# Patient Record
Sex: FEMALE | Race: BLACK OR AFRICAN AMERICAN | Employment: OTHER | URBAN - METROPOLITAN AREA
[De-identification: names, ages, dates, MRNs, and addresses within clinical notes are randomized per-mention and may not be internally consistent; named-entity substitution may affect disease eponyms.]

---

## 2019-10-17 ENCOUNTER — OFFICE VISIT (OUTPATIENT)
Dept: PRIMARY CARE CLINIC | Age: 69
End: 2019-10-17

## 2019-10-17 VITALS
HEART RATE: 70 BPM | DIASTOLIC BLOOD PRESSURE: 67 MMHG | SYSTOLIC BLOOD PRESSURE: 118 MMHG | OXYGEN SATURATION: 97 % | RESPIRATION RATE: 16 BRPM | WEIGHT: 156.8 LBS | BODY MASS INDEX: 28.85 KG/M2 | TEMPERATURE: 97.8 F | HEIGHT: 62 IN

## 2019-10-17 DIAGNOSIS — Z76.89 ENCOUNTER TO ESTABLISH CARE: ICD-10-CM

## 2019-10-17 DIAGNOSIS — R26.9 GAIT DIFFICULTY: ICD-10-CM

## 2019-10-17 DIAGNOSIS — I69.359 CVA, OLD, HEMIPARESIS (HCC): Primary | ICD-10-CM

## 2019-10-17 DIAGNOSIS — E11.65 TYPE 2 DIABETES MELLITUS WITH HYPERGLYCEMIA, UNSPECIFIED WHETHER LONG TERM INSULIN USE (HCC): ICD-10-CM

## 2019-10-17 DIAGNOSIS — I10 HYPERTENSION, UNSPECIFIED TYPE: ICD-10-CM

## 2019-10-17 DIAGNOSIS — R44.0 AUDITORY HALLUCINATIONS: ICD-10-CM

## 2019-10-17 DIAGNOSIS — R53.1 WEAKNESS: ICD-10-CM

## 2019-10-17 RX ORDER — METFORMIN HYDROCHLORIDE 1000 MG/1
1000 TABLET ORAL 2 TIMES DAILY WITH MEALS
COMMUNITY
End: 2019-12-20 | Stop reason: DRUGHIGH

## 2019-10-17 RX ORDER — TRAZODONE HYDROCHLORIDE 50 MG/1
TABLET ORAL
COMMUNITY
End: 2019-10-17

## 2019-10-17 RX ORDER — CLOPIDOGREL BISULFATE 75 MG/1
TABLET ORAL
COMMUNITY
End: 2019-11-18 | Stop reason: SDUPTHER

## 2019-10-17 RX ORDER — LISINOPRIL 30 MG/1
30 TABLET ORAL DAILY
COMMUNITY
End: 2020-02-24 | Stop reason: SDUPTHER

## 2019-10-17 RX ORDER — ROSUVASTATIN CALCIUM 20 MG/1
20 TABLET, COATED ORAL
Status: ON HOLD | COMMUNITY
End: 2020-03-26 | Stop reason: CLARIF

## 2019-10-17 RX ORDER — SERTRALINE HYDROCHLORIDE 50 MG/1
TABLET, FILM COATED ORAL
COMMUNITY
End: 2019-12-31 | Stop reason: SDUPTHER

## 2019-10-17 RX ORDER — PROPRANOLOL HYDROCHLORIDE 40 MG/1
40 TABLET ORAL 2 TIMES DAILY
Status: ON HOLD | COMMUNITY
End: 2020-03-26 | Stop reason: CLARIF

## 2019-10-17 RX ORDER — QUETIAPINE FUMARATE 50 MG/1
50 TABLET, FILM COATED ORAL 2 TIMES DAILY
COMMUNITY
End: 2019-11-18 | Stop reason: SDUPTHER

## 2019-10-17 RX ORDER — AMLODIPINE BESYLATE AND ATORVASTATIN CALCIUM 10; 10 MG/1; MG/1
1 TABLET, FILM COATED ORAL DAILY
COMMUNITY
End: 2019-11-23

## 2019-10-17 NOTE — PROGRESS NOTES
This note will not be viewable in [address] Javon Cline is a 68y.o. year old female who is a new patient to me today. She was previously followed by primary care in [place]. Javon Cline is a  68 y.o. female presents for visit. Chief Complaint   Patient presents with    New Patient     patient is from [place] and was visiting and had a stroke and states that she is weak in the limbs and has a echo in the ear. HPI    Patient presents with her daughter and a family members to establish care and for follow-up after ED to hospital admission [facility] for possible stroke/TIA on 9/27/2019. Discharged on 9/28/2019 and CVA/TIA ruled out. Patient discharge documents brought in for review. She has a history of a CVA in January 2018 with residual mild left sided hemiparesis. Reports she was visiting family members in [place] when she noticed increased left-sided numbness and weakness. Brain MRI negative, head CT negative and EKG normal.  Levemir was decreased to 20 units subcu daily due to lower blood sugar readings. She was discharged home with recommendation for home PT/OT. Family members report patient started to experience auditory hallucinations after her stroke last year. Hallucinations are pretty much continuous and patient has conversations with voices. She takes Seroquel 50 mg at bedtime that helps her sleep but not consistently. Patient carries the diagnoses of diabetes type 2, hypertension and depression and anxiety. Misses doses at times. Laboratory data on 9/28/2019 showed a hemoglobin A1c of 9.3. Review of Systems   Constitutional: Negative for chills and fever. Gastrointestinal: Positive for heartburn. Neurological: Positive for tremors and sensory change. Focal weakness: Left hemiparesis- s/p CVA in January 2018. Psychiatric/Behavioral: Positive for depression and memory loss.  Hallucinations: auditory-daily  The patient is nervous/anxious and has insomnia. Past Medical History:   Diagnosis Date    Anxiety     Auditory hallucinations     Depression     Diabetes ([facility] [address]     Esophageal reflux     Hypertension     Stroke ([facility] [address] 01/2018      History reviewed. No pertinent surgical history. Social History     Tobacco Use    Smoking status: Never Smoker    Smokeless tobacco: Never Used   Substance Use Topics    Alcohol use: Not Currently      Social History     Social History Narrative    Not on file     History reviewed. No pertinent family history. Prior to Admission medications    Medication Sig Start Date End Date Taking? Authorizing Provider   amLODIPine-atorvastatin (CADUET) 10-10 mg per tablet Take 1 Tab by mouth daily. Yes Provider, Historical   clopidogrel (PLAVIX) 75 mg tab Take  by mouth. Yes Provider, Historical   insulin detemir U-100 (LEVEMIR U-100 INSULIN) 100 unit/mL injection by SubCUTAneous route nightly. Yes Provider, Historical   lisinopril (PRINIVIL, ZESTRIL) 30 mg tablet Take 30 mg by mouth daily. Yes Provider, Historical   metFORMIN (GLUCOPHAGE) 1,000 mg tablet Take 1,000 mg by mouth two (2) times daily (with meals). Yes Provider, Historical   propranolol (INDERAL) 40 mg tablet Take  by mouth four (4) times daily. Yes Provider, Historical   QUEtiapine (SEROQUEL) 50 mg tablet Take 50 mg by mouth two (2) times a day. Yes Provider, Historical   rosuvastatin (CRESTOR) 20 mg tablet Take 20 mg by mouth nightly. Yes Provider, Historical   sertraline HCl (SERTRALINE PO) Take  by mouth. Yes Provider, Historical   traZODone (DESYREL) 50 mg tablet Take  by mouth nightly.   10/17/19  Provider, Historical      No Known Allergies       Visit Vitals  /67 (BP 1 Location: Left arm, BP Patient Position: Sitting)   Pulse 70   Temp 97.8 °F (36.6 °C) (Oral)   Resp 16   Ht 5' 2\" (1.575 m)   Wt 156 lb 12.8 oz (71.1 kg)   SpO2 97%   BMI 28.68 kg/m²     Physical Exam   Constitutional: She is oriented to person, place, and time. She appears well-developed and well-nourished. No distress. HENT:   Head: Normocephalic and atraumatic. Right Ear: External ear normal.   Left Ear: External ear normal.   Eyes: Conjunctivae are normal.   Cardiovascular: Normal rate and regular rhythm. Pulmonary/Chest: Effort normal and breath sounds normal. She has no wheezes. Musculoskeletal: She exhibits no edema. Left hemiparesis in upper and lower extremities-    Neurological: She is alert and oriented to person, place, and time. Gait abnormal.   Skin: Skin is warm and dry. Psychiatric: She has a normal mood and affect. Her behavior is normal.   Nursing note and vitals reviewed. ASSESSMENT AND PLAN:  There are no active problems to display for this patient. ICD-10-CM ICD-9-CM   1. CVA, old, hemiparesis ([facility] [address] I69.359 438.20   2. Auditory hallucinations R44.0 780.1   3. Weakness R53.1 780.79   4. Gait difficulty R26.9 781.2   5. Hypertension, unspecified type I10 401.9   6. Type 2 diabetes mellitus with hyperglycemia, unspecified whether long term insulin use (HCC) E11.65 250.00   7.  Encounter to establish care Z76.89 V65.8     Orders Placed This Encounter    [facility] Neurology [facility]     Referral Priority:   Routine     Referral Type:   Consultation     Referral Reason:   Specialty Services Required     Referred to Provider:   French Coronado MD     Number of Visits Requested:   1    REFERRAL TO PSYCHIATRY     Referral Priority:   Routine     Referral Type:   Behavioral Health     Referral Reason:   Specialty Services Required     Referred to Provider:   Ligia Hoff MD     Number of Visits Requested:   1    [facility] Physical Therapy [facility]     Referral Priority:   Routine     Referral Type:   PT/OT/ST     Referral Reason:   Specialty Services Required     Requested Specialty:   Physical Therapy     Number of Visits Requested:   1    amLODIPine-atorvastatin (CADUET) 10-10 mg per tablet     Sig: Take 1 Tab by mouth daily.  clopidogrel (PLAVIX) 75 mg tab     Sig: Take  by mouth.  insulin detemir U-100 (LEVEMIR U-100 INSULIN) 100 unit/mL injection     Sig: by SubCUTAneous route nightly.  lisinopril (PRINIVIL, ZESTRIL) 30 mg tablet     Sig: Take 30 mg by mouth daily.  metFORMIN (GLUCOPHAGE) 1,000 mg tablet     Sig: Take 1,000 mg by mouth two (2) times daily (with meals).  propranolol (INDERAL) 40 mg tablet     Sig: Take  by mouth four (4) times daily.  QUEtiapine (SEROQUEL) 50 mg tablet     Sig: Take 50 mg by mouth two (2) times a day.  rosuvastatin (CRESTOR) 20 mg tablet     Sig: Take 20 mg by mouth nightly.  sertraline HCl (SERTRALINE PO)     Sig: Take  by mouth.  DISCONTD: traZODone (DESYREL) 50 mg tablet     Sig: Take  by mouth nightly. Diagnoses and all orders for this visit:    1. CVA, old, hemiparesis ([facility] [address]  -     [address] THERAPY        -      Continue Plavix and Crestor. 2. Auditory hallucinations  -     REFERRAL TO NEUROLOGY  -     REFERRAL TO PSYCHIATRY        -       3. Weakness  -     REFERRAL TO PHYSICAL THERAPY    4. Gait difficulty  -     REFERRAL TO PHYSICAL THERAPY    5. Hypertension, unspecified type       -Continue lisinopril, amlodipine and propranolol    6. type 2 diabetes mellitus with complication, with long-term current use of insulin ([facility] [address]        -Continue current treatment. 7. Encounter to establish care      lab results and schedule of future lab studies reviewed with patient  reviewed diet, exercise and weight control    See scanned patient discharge documents under media tab. Follow-up and Dispositions    · Return in about 4 weeks (around 11/14/2019), or if symptoms worsen or fail to improve, for chronic care with Dr. Ibrahima Walker.                Disclaimer:  Advised her to call back or return to office if symptoms worsen/change/persist.  Discussed expected course/resolution/complications of diagnosis in detail with patient. Medication risks/benefits/costs/interactions/alternatives discussed with patient. She was given an after visit summary which includes diagnoses, current medications, & vitals. She expressed understanding with the diagnosis and plan.

## 2019-10-17 NOTE — PROGRESS NOTES
Chief Complaint   Patient presents with    New Patient     patient is from [place] and was visiting and had a stroke and states that she is weak in the limbs and has a echo in the ear.

## 2019-10-22 NOTE — TELEPHONE ENCOUNTER
LOV: 10/17/2019  Labs: None on file   A1C: Not on file   Refill: 1st Refill- New Patient   Next Appt:     Jody she has upcoming appointments with Sports Medicine and Primary Care? Maybe, she is going to them for PT? Your POC:   Return in about 4 weeks (around 11/14/2019), or if symptoms worsen or fail to improve, for chronic care with Dr. Sammie Soriano- This request was sent back to us and they said your patient.

## 2019-11-05 ENCOUNTER — HOSPITAL ENCOUNTER (OUTPATIENT)
Dept: PHYSICAL THERAPY | Age: 69
Discharge: HOME | End: 2019-11-05
Payer: MEDICARE

## 2019-11-05 PROCEDURE — 97112 NEUROMUSCULAR REEDUCATION: CPT | Performed by: PHYSICAL THERAPIST

## 2019-11-05 PROCEDURE — 97110 THERAPEUTIC EXERCISES: CPT | Performed by: PHYSICAL THERAPIST

## 2019-11-05 PROCEDURE — 97162 PT EVAL MOD COMPLEX 30 MIN: CPT | Performed by: PHYSICAL THERAPIST

## 2019-11-05 NOTE — PROGRESS NOTES
PT INITIAL EVALUATION NOTE - [facility] 2-15    Patient Name: Vahe Rai  Date:2019  : 1950  [x]  Patient  Verified  Payor: VA MEDICARE / Plan: VA MEDICARE PART A & B / Product Type: Medicare /    In time:3:00 PM  Out time:4:00 PM  Total Treatment Time (min): 60  Total Timed Codes (min): 25  1:1 Treatment Time ( [address] only): 60   Visit #: 1     Treatment Area: Gait instability [R26.81]    SUBJECTIVE  Pain Level (0-10 scale): 0  Any medication changes, allergies to medications, adverse drug reactions, diagnosis change, or new procedure performed?: [] No    [x] Yes (see summary sheet for update)  Subjective:    L hemiparesis, CVA  PLOF: Significantly less limitation with walking, standing  Mechanism of Injury: Patient reports an onset of left-sided weakness and numbness on 19 and was evaluated at [facility]. They ruled out a CVA/TIA and she was told to f/u with her PCP. Previous Treatment/Compliance: On a f/u visit with her PCP two weeks later, she continued to experience L UE and LE weakness and was referred to PT for strengthening. PMHx/Surgical Hx:   CVA in 2018  Work Hx: n/a  Living Situation: Lives currently with daughter's family, but permanently resides in [place], [place]. Plans on returning home to [place] to a single family home once she has greater strength and balance.   Pt Goals: to reduce fall risk  Barriers: none  Motivation: motivated  Substance use: none  FABQ Score: n/a  Cognition: A & O x 4        OBJECTIVE/EXAMINATION                                UPPER QUARTER   MUSCLE STRENGTH  KEY       R  L  0 - No Contraction  C1, C2 Neck Flex     1 - Trace   C3 Side Flex      2 - Poor   C4 Sh Elev      3 - Fair    C5 Deltoid/Biceps 4/5  3+/5    4 - Good   C6 Wrist Ext  4/5  4/5    5 - Normal   C7 Triceps  4/5  3+/5        C8 Thumb Ext  3+/5  3+/5        T1 Hand Inst  3+/5  3+/5        LOWER QUARTER   MUSCLE STRENGTH  KEY       R  L  0 - No Contraction  L1, L2 Psoas  4/5  3+/5    1 - Trace   L3 Quads  4/5  3+/5    2 - Poor   L4 Tib Ant  4/5  3+/5    3 - Fair    L5 EHL  4/5  3+/5    4 - Good   S1 FHL  3+/5  3+/5    5 - Normal   S2 Hams  3+/5  3+/5          Sensation: Widespread throughout L side of body, no specific dermatomal distribution    Vision: NT    Balance/ Equilibrium:            Single Leg Stance:   R 10   Seconds L 8 Seconds     Eyes Closed: Unable to perform    Functional Mobility      Bed Mobility:  WNL          Transfers:       Sit-Stand:      Gait: Slow, cautious speed, weightbearing through forefoot       Stairs: NT    Behavior: in good spirits, motivated    Cognition:   [] One Step Commands   [x] Multiple Commands, although the patient has significant difficulty. Often confuses previous commands with current demand and requires frequent verbal cueing for most balance testing       [x] Displays Neglect [] R  [x] L    Other:       Impaired Judgement: [x] Y    [] N      Impaired Vision:  [x] Y    [] N      Safety Awareness   [x] Y    [] N      Impaired Hearing  [x] Y    [] N      Able to Express Needs [x] Y    [] N    Optional Tests:       Functional Gait Assessment (30pt scale):16/30       Pate Balance Scale (56pt scale): 45/56      30 min Therapeutic Exercise:  [x] See flow sheet :   Rationale: increase ROM, increase strength and improve coordination to improve the patients ability to walk without LOB     10 min Neuromuscular Re-education:  [x]  See flow sheet :   Rationale: improve coordination, improve balance and increase proprioception  to improve the patients ability to walk without LOB          With   [] TE   [] TA   [] neuro   [] other: Patient Education: [x] Review HEP    [] Progressed/Changed HEP based on:   [] positioning   [] body mechanics   [] transfers   [] heat/ice application    [] other:      Other Objective/Functional Measures:   Patient needed frequent verbal cueing for form and to complete required repetitions.  Likely due to cognitive limitations and not a lack of motivation. Pain Level (0-10 scale) post treatment: 0    ASSESSMENT/Changes in Function:     [x]  See Plan of Israel Slater, PT , DPT, OCS, Cert.  DN   11/5/2019

## 2019-11-05 NOTE — PROGRESS NOTES
[address]  Phone: [phone]  Fax: [phone]    Plan of Care/ Statement of Necessity for Physical Therapy Services 2-15    Patient name: Zoë Weber  : 1950  Provider#: [id number]  Referral source: Vivi Pereira NP      Medical/Treatment Diagnosis: Gait instability [R26.81]     Prior Hospitalization: see medical history     Comorbidities: CVA  Prior Level of Function: see initial eval  Medications: Verified on Patient Summary List    Start of Care: 19      Onset Date: 19       The Plan of Care and following information is based on the information from the initial evaluation. Assessment/ key information: Patient presents with left hemiparesis due to an old CVA from 2018. She has significant strength and endurance limitations at the L LE and is at a moderate fall risk. Evaluation Complexity History MEDIUM  Complexity : 1-2 comorbidities / personal factors will impact the outcome/ POC ; Examination MEDIUM Complexity : 3 Standardized tests and measures addressing body structure, function, activity limitation and / or participation in recreation  ;Presentation MEDIUM Complexity : Evolving with changing characteristics  ; Clinical Decision Making MEDIUM Complexity : FOTO score of 26-74  Overall Complexity Rating: MEDIUM    Problem List: pain affecting function, decrease ROM, decrease strength, impaired gait/ balance, decrease ADL/ functional abilitiies, decrease activity tolerance, decrease flexibility/ joint mobility and decrease transfer abilities   Treatment Plan may include any combination of the following: Therapeutic exercise, Therapeutic activities, Neuromuscular re-education, Physical agent/modality, Gait/balance training, Manual therapy, Patient education, Self Care training, Functional mobility training, Home safety training and Stair training  Patient / Family readiness to learn indicated by: asking questions, trying to perform skills and interest  Persons(s) to be included in education: patient (P)  Barriers to Learning/Limitations: None  Patient Goal (s): I want to be able to walk without a loss of balance.   Patient Self Reported Health Status: excellent  Rehabilitation Potential: excellent    Short Term Goals: To be accomplished in 12 treatments:  1. Patient will be able to demonstrate SLS >15 sec B to allow for improved stability when ambulating on uneven surfaces. 2. Patient will be able to walk two blocks with no rest breaks and no LOB. 3. Patient will be able to stand for 10 minutes with no rest breaks and no LOB. Long Term Goals: To be accomplished in 24 treatments:  1. Patient will be able to demonstrate SLS >30 sec B to allow for decreased fall risk. 2. Patient will be able to walk 1/4 mile with no rest breaks or LOB. 3. Patient will be able to stand for 20 minutes with no rest breaks or LOB. Frequency / Duration: Patient to be seen 2 times per week for 12 weeks. Patient/ Caregiver education and instruction: self care, activity modification and exercises    [x]  Plan of care has been reviewed with PTA    Maty Heartable, PT , DPT, OCS, Cert. DN   11/5/2019     ________________________________________________________________________    I certify that the above Therapy Services are being furnished while the patient is under my care. I agree with the treatment plan and certify that this therapy is necessary.     [de-identified] Signature:____________________  Date:____________Time: _________

## 2019-11-07 ENCOUNTER — HOSPITAL ENCOUNTER (OUTPATIENT)
Dept: PHYSICAL THERAPY | Age: 69
Discharge: HOME | End: 2019-11-07
Payer: MEDICARE

## 2019-11-07 PROCEDURE — 97112 NEUROMUSCULAR REEDUCATION: CPT | Performed by: PHYSICAL MEDICINE & REHABILITATION

## 2019-11-07 PROCEDURE — 97110 THERAPEUTIC EXERCISES: CPT | Performed by: PHYSICAL MEDICINE & REHABILITATION

## 2019-11-07 NOTE — PROGRESS NOTES
PT DAILY TREATMENT NOTE - [facility] 2-15    Patient Name: Alonzo Hagan  Date:2019  : 1950  [x]  Patient  Verified  Payor: VA MEDICARE / Plan: VA MEDICARE PART A & B / Product Type: Medicare /    In time:210 pm  Out time: 250 pm  Total Treatment Time (min): 40  Total Timed Codes (min): 40  1:1 Treatment Time ( only): 40   Visit #: 2      Treatment Area: Gait instability [R26.81]    SUBJECTIVE  Pain Level (0-10 scale): 0/10  Any medication changes, allergies to medications, adverse drug reactions, diagnosis change, or new procedure performed?: [x] No    [] Yes (see summary sheet for update)  Subjective functional status/changes:   [] No changes reported  Patient reported that she felt really good after last visit. OBJECTIVE  30 min Therapeutic Exercise:  [x] See flow sheet :   Rationale: increase ROM, increase strength and improve coordination to improve the patients ability to ADLs, standing and walking tolerance     10 min Neuromuscular Re-education:  [x]  See flow sheet :   Rationale: improve coordination, improve balance and increase proprioception  to improve the patients ability to ADLs, standing and walking tolerance          With   [x] TE   [] TA   [] neuro   [] other: Patient Education: [x] Review HEP    [] Progressed/Changed HEP based on:   [] positioning   [] body mechanics   [] transfers   [] heat/ice application    [] other:      Other Objective/Functional Measures: Mod fatigue with today's strength exercises     Pain Level (0-10 scale) post treatment: 0/10    ASSESSMENT/Changes in Function:     Patient will continue to benefit from skilled PT services to modify and progress therapeutic interventions, address functional mobility deficits, address ROM deficits, address strength deficits, analyze and address soft tissue restrictions, analyze and cue movement patterns, analyze and modify body mechanics/ergonomics and assess and modify postural abnormalities to attain remaining goals.      [] See Plan of Care  []  See progress note/recertification  []  See Discharge Summary         Progress towards goals / Updated goals:  Patient is progressing well towards goals but needs mod and frequent vcs for proper form.     PLAN  [x]  Upgrade activities as tolerated     [x]  Continue plan of care  [x]  Update interventions per flow sheet       []  Discharge due to:_  []  Other:_      SHAWN Davies, CPT  11/7/2019

## 2019-11-12 ENCOUNTER — APPOINTMENT (OUTPATIENT)
Dept: PHYSICAL THERAPY | Age: 69
End: 2019-11-12
Payer: MEDICARE

## 2019-11-14 ENCOUNTER — APPOINTMENT (OUTPATIENT)
Dept: PHYSICAL THERAPY | Age: 69
End: 2019-11-14
Payer: MEDICARE

## 2019-11-18 ENCOUNTER — OFFICE VISIT (OUTPATIENT)
Dept: INTERNAL MEDICINE CLINIC | Age: 69
End: 2019-11-18

## 2019-11-18 VITALS
HEIGHT: 62 IN | WEIGHT: 154 LBS | HEART RATE: 67 BPM | SYSTOLIC BLOOD PRESSURE: 115 MMHG | RESPIRATION RATE: 16 BRPM | BODY MASS INDEX: 28.34 KG/M2 | DIASTOLIC BLOOD PRESSURE: 50 MMHG | TEMPERATURE: 98.4 F

## 2019-11-18 DIAGNOSIS — R41.3 MEMORY LOSS: ICD-10-CM

## 2019-11-18 DIAGNOSIS — R44.0 AUDITORY HALLUCINATION: ICD-10-CM

## 2019-11-18 DIAGNOSIS — Z86.73 HISTORY OF CVA (CEREBROVASCULAR ACCIDENT): Primary | ICD-10-CM

## 2019-11-18 LAB — HBA1C MFR BLD HPLC: 11.6 %

## 2019-11-18 RX ORDER — QUETIAPINE FUMARATE 50 MG/1
50 TABLET, FILM COATED ORAL 2 TIMES DAILY
Qty: 60 TAB | Refills: 2 | Status: SHIPPED | OUTPATIENT
Start: 2019-11-18 | End: 2020-01-02 | Stop reason: SDUPTHER

## 2019-11-18 RX ORDER — CLOPIDOGREL BISULFATE 75 MG/1
75 TABLET ORAL DAILY
Qty: 30 TAB | Refills: 2 | Status: SHIPPED | OUTPATIENT
Start: 2019-11-18 | End: 2020-02-25

## 2019-11-18 RX ORDER — METFORMIN HYDROCHLORIDE 500 MG/1
TABLET, EXTENDED RELEASE ORAL
Qty: 60 TAB | Refills: 2 | Status: ON HOLD | OUTPATIENT
Start: 2019-11-18 | End: 2020-03-26 | Stop reason: CLARIF

## 2019-11-18 NOTE — PROGRESS NOTES
Subjective:      Michael Ashley is a 68 y.o. female who presents today for   Chief Complaint   Patient presents with    \[facility]\"" Care     2 nd stroke in the last 2 years. patient comes in accompanied by her daughter    Type 2 diabetes  a1c 11.6  Sugars at home running 160-170  Once it went to 600! She eats regular meals    Feb 2018 had major stroke left sided weakness  Recently had another stroke 9/27/19 and was seen at [facility] similar symptoms, left arm and left leg more significantly affected  She did have some of her speech affected. She has noticed some difficulty with memory. Mainly short term. She says she has been hearing voices talking to her. Apparently has history of auditory hallucinations after her first stroke. She was seen by primary care provider with nino mensah in oct. She was referred to neuro , psych and PT. No follow up or follow through with any of the recommendations. Currently living with her daughter- patient recently moved from [facility]      There are no active problems to display for this patient. Current Outpatient Medications   Medication Sig Dispense Refill    insulin detemir U-100 (LEVEMIR U-100 INSULIN) 100 unit/mL injection 20 Units by SubCUTAneous route nightly for 90 days. 6 mL 2    amLODIPine-atorvastatin (CADUET) 10-10 mg per tablet Take 1 Tab by mouth daily.  clopidogrel (PLAVIX) 75 mg tab Take  by mouth.  lisinopril (PRINIVIL, ZESTRIL) 30 mg tablet Take 30 mg by mouth daily.  metFORMIN (GLUCOPHAGE) 1,000 mg tablet Take 1,000 mg by mouth two (2) times daily (with meals).  propranolol (INDERAL) 40 mg tablet Take  by mouth four (4) times daily.  QUEtiapine (SEROQUEL) 50 mg tablet Take 50 mg by mouth two (2) times a day.  rosuvastatin (CRESTOR) 20 mg tablet Take 20 mg by mouth nightly.  sertraline HCl (SERTRALINE PO) Take  by mouth.        No Known Allergies  Past Medical History:   Diagnosis Date    Anxiety     Auditory hallucinations     Depression     Diabetes ([facility] [address]     Esophageal reflux     Hypertension     [facility]     No past surgical history on file. No family history on file. Social History     Tobacco Use    Smoking status: Never Smoker    Smokeless tobacco: Never Used   Substance Use Topics    Alcohol use: Not Currently        Review of Systems    A comprehensive review of systems was negative except for that written in the HPI. Objective: There were no vitals taken for this visit. General:  Alert, cooperative, no distress, appears stated age. Head:  Normocephalic, without obvious abnormality, atraumatic. Eyes:  Conjunctivae/corneas clear. PERRL, EOMs intact. Fundi benign. Ears:  Normal TMs and external ear canals both ears. Nose: Nares normal. Septum midline. Mucosa normal. No drainage or sinus tenderness. Throat: Lips, mucosa, and tongue normal. Teeth and gums normal.   Neck: Supple, symmetrical, trachea midline, no adenopathy, thyroid: no enlargement/tenderness/nodules, no carotid bruit and no JVD. Back:   Symmetric, no curvature. ROM normal. No CVA tenderness. Lungs:   Clear to auscultation bilaterally. Chest wall:  No tenderness or deformity. Heart:  Regular rate and rhythm, S1, S2 normal, no murmur, click, rub or gallop. Abdomen:   Soft, non-tender. Bowel sounds normal. No masses,  No organomegaly. Extremities: Extremities normal, atraumatic, no cyanosis or edema. Pulses: 2+ and symmetric all extremities. Skin: Skin color, texture, turgor normal. No rashes or lesions. Lymph nodes: Cervical, supraclavicular, and axillary nodes normal.   Neurologic: CNII-XII intact. Normal strength, sensation and reflexes throughout. Assessment/Plan:       ICD-10-CM ICD-9-CM    1. History of CVA (cerebrovascular accident) Z80.78 V12.54 REFERRAL TO NEUROLOGY  Refill plavix   2. Auditory hallucination  ?dementia R44.0 780.1 REFERRAL TO NEUROLOGY  Refill seroquel   3.  Type II diabetes mellitus with complication, uncontrolled (HCC) E11.8 250.92 insulin detemir U-100 (LEVEMIR U-100 INSULIN) 100 unit/mL injection  Continue metformin  Needs endocrine consult  Stressed medication compliance    E11.65  AMB POC HEMOGLOBIN A1C  Discussed titration of insulin to fasting glucose <140 in am  Needs to eat regular meals          Advised her to call back or return to office if symptoms worsen/change/persist.  Discussed expected course/resolution/complications of diagnosis in detail with patient. Medication risks/benefits/costs/interactions/alternatives discussed with patient. She was given an after visit summary which includes diagnoses, current medications, & vitals. She expressed understanding with the diagnosis and plan.

## 2019-11-19 ENCOUNTER — APPOINTMENT (OUTPATIENT)
Dept: PHYSICAL THERAPY | Age: 69
End: 2019-11-19
Payer: MEDICARE

## 2019-11-21 ENCOUNTER — APPOINTMENT (OUTPATIENT)
Dept: PHYSICAL THERAPY | Age: 69
End: 2019-11-21
Payer: MEDICARE

## 2019-11-26 ENCOUNTER — APPOINTMENT (OUTPATIENT)
Dept: PHYSICAL THERAPY | Age: 69
End: 2019-11-26
Payer: MEDICARE

## 2019-12-04 ENCOUNTER — APPOINTMENT (OUTPATIENT)
Dept: CT IMAGING | Age: 69
End: 2019-12-04
Attending: EMERGENCY MEDICINE
Payer: MEDICARE

## 2019-12-04 ENCOUNTER — HOSPITAL ENCOUNTER (EMERGENCY)
Age: 69
Discharge: HOME | End: 2019-12-04
Attending: EMERGENCY MEDICINE
Payer: MEDICARE

## 2019-12-04 ENCOUNTER — APPOINTMENT (OUTPATIENT)
Dept: GENERAL RADIOLOGY | Age: 69
End: 2019-12-04
Attending: EMERGENCY MEDICINE
Payer: MEDICARE

## 2019-12-04 VITALS
WEIGHT: 154 LBS | DIASTOLIC BLOOD PRESSURE: 62 MMHG | SYSTOLIC BLOOD PRESSURE: 124 MMHG | TEMPERATURE: 97.9 F | BODY MASS INDEX: 28.34 KG/M2 | HEIGHT: 62 IN | HEART RATE: 76 BPM | RESPIRATION RATE: 16 BRPM | OXYGEN SATURATION: 95 %
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
## 2019-12-04 PROCEDURE — 99283 EMERGENCY DEPT VISIT LOW MDM: CPT

## 2019-12-04 PROCEDURE — 71046 X-RAY EXAM CHEST 2 VIEWS: CPT

## 2019-12-04 PROCEDURE — 70450 CT HEAD/BRAIN W/O DYE: CPT

## 2019-12-04 NOTE — ED TRIAGE NOTES
Pt states slipped and fell in the shower on the Wednesday before Thanksgiving in the bathroom striking head on toilet. Pt states did lose consciousness during fall. Pt now with headache, mild swelling that started to left upper forehead overnight, and increased confusion at night per daughter. Pt taking Plavix for stroke 4 months ago.

## 2019-12-05 NOTE — DISCHARGE INSTRUCTIONS
Patient Education        Preventing Falls: Care Instructions  Your Care Instructions    Getting around your home safely can be a challenge if you have injuries or health problems that make it easy for you to fall. Loose rugs and furniture in walkways are among the dangers for many older people who have problems walking or who have poor eyesight. People who have conditions such as arthritis, osteoporosis, or dementia also have to be careful not to fall. You can make your home safer with a few simple measures. Follow-up care is a key part of your treatment and safety. Be sure to make and go to all appointments, and call your doctor if you are having problems. It's also a good idea to know your test results and keep a list of the medicines you take. How can you care for yourself at home? Taking care of yourself  · You may get dizzy if you do not drink enough water. To prevent dehydration, drink plenty of fluids, enough so that your urine is light yellow or clear like water. Choose water and other caffeine-free clear liquids. If you have kidney, heart, or liver disease and have to limit fluids, talk with your doctor before you increase the amount of fluids you drink. · Exercise regularly to improve your strength, muscle tone, and balance. Walk if you can. Swimming may be a good choice if you cannot walk easily. · Have your vision and hearing checked each year or any time you notice a change. If you have trouble seeing and hearing, you might not be able to avoid objects and could lose your balance. · Know the side effects of the medicines you take. Ask your doctor or pharmacist whether the medicines you take can affect your balance. Sleeping pills or sedatives can affect your balance. · Limit the amount of alcohol you drink. Alcohol can impair your balance and other senses. · Ask your doctor whether calluses or corns on your feet need to be removed.  If you wear loose-fitting shoes because of calluses or corns, you can lose your balance and fall. · Talk to your doctor if you have numbness in your feet. Preventing falls at home  · Remove raised doorway thresholds, throw rugs, and clutter. Repair loose carpet or raised areas in the floor. · Move furniture and electrical cords to keep them out of walking paths. · Use nonskid floor wax, and wipe up spills right away, especially on ceramic tile floors. · If you use a walker or cane, put rubber tips on it. If you use crutches, clean the bottoms of them regularly with an abrasive pad, such as steel wool. · Keep your house well lit, especially [employer], and outside walkways. Use night-lights in areas such as hallways and bathrooms. Add extra light switches or use remote switches (such as switches that go on or off when you clap your hands) to make it easier to turn lights on if you have to get up during the night. · Install sturdy handrails on stairways. · Move items in your cabinets so that the things you use a lot are on the lower shelves (about waist level). · Keep a cordless phone and a flashlight with new batteries by your bed. If possible, put a phone in each of the main rooms of your house, or carry a cell phone in case you fall and cannot reach a phone. Or, you can wear a device around your neck or wrist. You push a button that sends a signal for help. · Wear low-heeled shoes that fit well and give your feet good support. Use footwear with nonskid soles. Check the heels and soles of your shoes for wear. Repair or replace worn heels or soles. · Do not wear socks without shoes on wood floors. · Walk on the grass when the sidewalks are slippery. If you live in an area that gets snow and ice in the winter, sprinkle salt on slippery steps and sidewalks. Preventing falls in the bath  · Install grab bars and nonskid mats inside and outside your shower or tub and near the toilet and sinks. · Use shower chairs and bath benches.   · Use a hand-held shower head that will allow you to sit while showering. · Get into a tub or shower by putting the weaker leg in first. Get out of a tub or shower with your strong side first.  · Repair loose toilet seats and consider installing a raised toilet seat to make getting on and off the toilet easier. · Keep your bathroom door unlocked while you are in the shower. Where can you learn more? Go to http://dimitry-casper.info/. Enter 0476 [id number] in the search box to learn more about \"Preventing Falls: Care Instructions. \"  Current as of: March 16, 2018  Content Version: 11.8  © 6227-0108 [employer]. Care instructions adapted under license by [employer] (which disclaims liability or warranty for this information). If you have questions about a medical condition or this instruction, always ask your healthcare professional. [address] any warranty or liability for your use of this information.

## 2019-12-05 NOTE — ED PROVIDER NOTES
68 y.o. female with past medical history significant for HTN, diabetes, stroke, esophageal reflux, depression, and anxiety who presents from home via private vehicle accompanied by daughter with chief complaint of headache. Pt's daughter reports that she fell in the bathroom 1 week ago while getting out of the bathtub and hit her head on the toilet. She says she lost consciousness but is not sure for how long. Pt began with headache 2 days ago and \"felt a knot\" on her forehead yesterday. She also c/o chest tenderness, and she reports weakness and tremors of her right hand since the fall. Of note, pt had a stroke 4 months ago with left-sided weakness and numbness. She is taking Plavix. Pt specifically denies any other pain or symptoms. There are no other acute medical concerns at this time. Social hx: Never tobacco smoker; Denies current EtOH use; Denies illicit drug use  PCP: Betzy Avitia MD    Note written by Amber Foss, as dictated by Sabine Stafford MD 7:09 PM    The history is provided by the patient, medical records and a relative. No  was used. Past Medical History:   Diagnosis Date    Anxiety     Auditory hallucinations     Depression     [facility] [address]     Esophageal reflux     Hypertension     Stroke ([facility] [address] 01/2018       No past surgical history on file. No family history on file.     Social History     Socioeconomic History    Marital status:      Spouse name: Not on file    Number of children: Not on file    Years of education: Not on file    Highest education level: Not on file   Occupational History    Not on file   Social Needs    Financial resource strain: Not on file    Food insecurity:     Worry: Not on file     Inability: Not on file    Transportation needs:     Medical: Not on file     Non-medical: Not on file   Tobacco Use    Smoking status: Never Smoker    Smokeless tobacco: Never Used   Substance and Sexual Activity    Alcohol use: Not Currently    Drug use: Not Currently    Sexual activity: Not Currently   Lifestyle    Physical activity:     Days per week: Not on file     Minutes per session: Not on file    Stress: Not on file   Relationships    Social connections:     Talks on phone: Not on file     Gets together: Not on file     Attends Anglican service: Not on file     Active member of club or organization: Not on file     Attends meetings of clubs or organizations: Not on file     Relationship status: Not on file    Intimate partner violence:     Fear of current or ex partner: Not on file     Emotionally abused: Not on file     Physically abused: Not on file     Forced sexual activity: Not on file   Other Topics Concern    Not on file   Social History Narrative    Not on file         ALLERGIES: Patient has no known allergies. Review of Systems   Constitutional: Negative for chills and fever. HENT: Negative for ear pain and sore throat. Eyes: Negative for pain. Respiratory: Negative for chest tightness and shortness of breath. Cardiovascular: Negative for chest pain. Gastrointestinal: Negative for abdominal pain. Genitourinary: Negative for flank pain. Musculoskeletal: Positive for myalgias. Negative for back pain. Skin: Negative for rash. Neurological: Positive for tremors, weakness and headaches. All other systems reviewed and are negative. Vitals:    12/04/19 1853   BP: 115/56   Pulse: 76   Resp: 16   Temp: 97.5 °F (36.4 °C)   SpO2: 96%   Weight: 69.9 kg (154 lb)   Height: 5' 2\" (1.575 m)            Physical Exam  Constitutional:       Appearance: She is well-developed. HENT:      Head: Normocephalic and atraumatic. Eyes:      General: No scleral icterus. Neck:      Musculoskeletal: No neck rigidity. Trachea: No tracheal deviation. Cardiovascular:      Rate and Rhythm: Normal rate. Pulmonary:      Effort: Pulmonary effort is normal. No respiratory distress. Abdominal:      General: There is no distension. Genitourinary:     Comments: deferred  Musculoskeletal:         General: No deformity. Skin:     General: Skin is dry. Neurological:      General: No focal deficit present. Mental Status: She is alert. Cranial Nerves: Cranial nerves are intact. No cranial nerve deficit. Sensory: Sensory deficit present. Motor: Motor function is intact. No weakness or pronator drift. Coordination: Finger-Nose-Finger Test normal.      Comments: She has decreased sensation to right side of her face. No pronator drift. Normal finger to nose. No focal weakness noted. Normal motor. No cranial nerve deficit. Psychiatric:         Mood and Affect: Mood normal.     Note written by Amber Connolly, as dictated by Wilfredo Gilliland MD 7:09 PM    MDM  Number of Diagnoses or Management Options  Fall, initial encounter:   Diagnosis management comments: 66-year-old female presents 1 week after a fall with continued headache. CT scan shows no acute process. She had minimal chest tenderness as well. Chest x-ray was unremarkable. Her neuro exam is stable. Advised to follow-up with her neurologist and PCP. Given return precautions.          Procedures

## 2019-12-07 ENCOUNTER — APPOINTMENT (OUTPATIENT)
Dept: GENERAL RADIOLOGY | Age: 69
End: 2019-12-07
Attending: PHYSICIAN ASSISTANT
Payer: MEDICARE

## 2019-12-07 ENCOUNTER — HOSPITAL ENCOUNTER (EMERGENCY)
Age: 69
Discharge: HOME | End: 2019-12-07
Attending: STUDENT IN AN ORGANIZED HEALTH CARE EDUCATION/TRAINING PROGRAM
Payer: MEDICARE

## 2019-12-07 ENCOUNTER — APPOINTMENT (OUTPATIENT)
Dept: CT IMAGING | Age: 69
End: 2019-12-07
Attending: PHYSICIAN ASSISTANT
Payer: MEDICARE

## 2019-12-07 VITALS
WEIGHT: 154 LBS | TEMPERATURE: 97.6 F | BODY MASS INDEX: 28.34 KG/M2 | OXYGEN SATURATION: 100 % | SYSTOLIC BLOOD PRESSURE: 115 MMHG | DIASTOLIC BLOOD PRESSURE: 41 MMHG | RESPIRATION RATE: 14 BRPM | HEIGHT: 62 IN | HEART RATE: 87 BPM

## 2019-12-07 DIAGNOSIS — R73.9 HYPERGLYCEMIA: ICD-10-CM
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
## 2019-12-07 LAB
ALBUMIN SERPL-MCNC: 3.2 G/DL (ref 3.5–5)
ALBUMIN/GLOB SERPL: 0.7 {RATIO} (ref 1.1–2.2)
ALP SERPL-CCNC: 216 U/L (ref 45–117)
ALT SERPL-CCNC: 40 U/L (ref 12–78)
ANION GAP SERPL CALC-SCNC: 8 MMOL/L (ref 5–15)
APPEARANCE UR: CLEAR
AST SERPL-CCNC: 60 U/L (ref 15–37)
BACTERIA URNS QL MICRO: NEGATIVE /HPF
BASOPHILS # BLD: 0 K/UL (ref 0–0.1)
BASOPHILS NFR BLD: 0 % (ref 0–1)
BILIRUB SERPL-MCNC: 1 MG/DL (ref 0.2–1)
BILIRUB UR QL: NEGATIVE
BUN SERPL-MCNC: 22 MG/DL (ref 6–20)
BUN/CREAT SERPL: 18 (ref 12–20)
CALCIUM SERPL-MCNC: 9.6 MG/DL (ref 8.5–10.1)
CHLORIDE SERPL-SCNC: 102 MMOL/L (ref 97–108)
CO2 SERPL-SCNC: 25 MMOL/L (ref 21–32)
COLOR UR: ABNORMAL
CREAT SERPL-MCNC: 1.19 MG/DL (ref 0.55–1.02)
DIFFERENTIAL METHOD BLD: ABNORMAL
EOSINOPHIL # BLD: 0.1 K/UL (ref 0–0.4)
EOSINOPHIL NFR BLD: 2 % (ref 0–7)
EPITH CASTS URNS QL MICRO: ABNORMAL /LPF
ERYTHROCYTE [DISTWIDTH] IN BLOOD BY AUTOMATED COUNT: 15.3 % (ref 11.5–14.5)
GLOBULIN SER CALC-MCNC: 4.8 G/DL (ref 2–4)
GLUCOSE SERPL-MCNC: 392 MG/DL (ref 65–100)
GLUCOSE UR STRIP.AUTO-MCNC: >1000 MG/DL
HCT VFR BLD AUTO: 28.6 % (ref 35–47)
HGB BLD-MCNC: 9.2 G/DL (ref 11.5–16)
HGB UR QL STRIP: NEGATIVE
HYALINE CASTS URNS QL MICRO: ABNORMAL /LPF (ref 0–5)
IMM GRANULOCYTES # BLD AUTO: 0 K/UL (ref 0–0.04)
IMM GRANULOCYTES NFR BLD AUTO: 0 % (ref 0–0.5)
KETONES UR QL STRIP.AUTO: NEGATIVE MG/DL
LEUKOCYTE ESTERASE UR QL STRIP.AUTO: NEGATIVE
LYMPHOCYTES # BLD: 1.9 K/UL (ref 0.8–3.5)
LYMPHOCYTES NFR BLD: 43 % (ref 12–49)
MCH RBC QN AUTO: 30 PG (ref 26–34)
MCHC RBC AUTO-ENTMCNC: 32.2 G/DL (ref 30–36.5)
MCV RBC AUTO: 93.2 FL (ref 80–99)
MONOCYTES # BLD: 0.4 K/UL (ref 0–1)
MONOCYTES NFR BLD: 9 % (ref 5–13)
NEUTS SEG # BLD: 2.1 K/UL (ref 1.8–8)
NEUTS SEG NFR BLD: 46 % (ref 32–75)
NITRITE UR QL STRIP.AUTO: NEGATIVE
NRBC # BLD: 0 K/UL (ref 0–0.01)
NRBC BLD-RTO: 0 PER 100 WBC
PH UR STRIP: 6 [PH] (ref 5–8)
PLATELET # BLD AUTO: 140 K/UL (ref 150–400)
PMV BLD AUTO: 12.2 FL (ref 8.9–12.9)
POTASSIUM SERPL-SCNC: 4.8 MMOL/L (ref 3.5–5.1)
PROT SERPL-MCNC: 8 G/DL (ref 6.4–8.2)
PROT UR STRIP-MCNC: NEGATIVE MG/DL
RBC # BLD AUTO: 3.07 M/UL (ref 3.8–5.2)
RBC #/AREA URNS HPF: ABNORMAL /HPF (ref 0–5)
SODIUM SERPL-SCNC: 135 MMOL/L (ref 136–145)
SP GR UR REFRACTOMETRY: 1.02 (ref 1–1.03)
TROPONIN I SERPL-MCNC: <0.05 NG/ML
UR CULT HOLD, URHOLD: NORMAL
UROBILINOGEN UR QL STRIP.AUTO: 1 EU/DL (ref 0.2–1)
WBC # BLD AUTO: 4.5 K/UL (ref 3.6–11)
WBC URNS QL MICRO: ABNORMAL /HPF (ref 0–4)

## 2019-12-07 PROCEDURE — 85025 COMPLETE CBC W/AUTO DIFF WBC: CPT

## 2019-12-07 PROCEDURE — 70450 CT HEAD/BRAIN W/O DYE: CPT

## 2019-12-07 PROCEDURE — 80053 COMPREHEN METABOLIC PANEL: CPT

## 2019-12-07 PROCEDURE — 73610 X-RAY EXAM OF ANKLE: CPT

## 2019-12-07 PROCEDURE — 99285 EMERGENCY DEPT VISIT HI MDM: CPT

## 2019-12-07 PROCEDURE — 84484 ASSAY OF TROPONIN QUANT: CPT

## 2019-12-07 PROCEDURE — 81001 URINALYSIS AUTO W/SCOPE: CPT

## 2019-12-07 PROCEDURE — 93005 ELECTROCARDIOGRAM TRACING: CPT

## 2019-12-07 PROCEDURE — 36415 COLL VENOUS BLD VENIPUNCTURE: CPT

## 2019-12-07 PROCEDURE — 72125 CT NECK SPINE W/O DYE: CPT

## 2019-12-07 NOTE — ED PROVIDER NOTES
18-year-old female history of anxiety, depression, diabetes, reflux, hypertension, CVA presenting to the ED for fall. Patient notes that about 2 to 3 hours prior to arrival she was coming down a flight of wooden steps in her socks when her feet went out from under her, causing her to fall. Patient notes that she hit her head, has mild pain in the neck, mild headache, also notes pain in the left ankle. Denies any prodromal symptoms including chest pain, shortness of breath, new numbness/weakness, lightheadedness, dizziness. No recent illness. Incidentally, patient was seen here 3 days ago for a fall that occurred 1 week prior. Patient does take Plavix. No other concerns. Past medical history: As above  Social history: Non-smoker. Past Medical History:   Diagnosis Date    Anxiety     Auditory hallucinations     Depression     [facility] [address]     Esophageal reflux     Hypertension     Stroke ([facility] [address] 01/2018       No past surgical history on file. No family history on file.     Social History     Socioeconomic History    Marital status:      Spouse name: Not on file    Number of children: Not on file    Years of education: Not on file    Highest education level: Not on file   Occupational History    Not on file   Social Needs    Financial resource strain: Not on file    Food insecurity:     Worry: Not on file     Inability: Not on file    Transportation needs:     Medical: Not on file     Non-medical: Not on file   Tobacco Use    Smoking status: Never Smoker    Smokeless tobacco: Never Used   Substance and Sexual Activity    Alcohol use: Not Currently    Drug use: Not Currently    Sexual activity: Not Currently   Lifestyle    Physical activity:     Days per week: Not on file     Minutes per session: Not on file    Stress: Not on file   Relationships    Social connections:     Talks on phone: Not on file     Gets together: Not on file     Attends Anabaptism service: Not on file     Active member of club or organization: Not on file     Attends meetings of clubs or organizations: Not on file     Relationship status: Not on file    Intimate partner violence:     Fear of current or ex partner: Not on file     Emotionally abused: Not on file     Physically abused: Not on file     Forced sexual activity: Not on file   Other Topics Concern    Not on file   Social History Narrative    Not on file         ALLERGIES: Patient has no known allergies. Review of Systems   Constitutional: Negative for fever. HENT: Negative for facial swelling. Respiratory: Negative for shortness of breath. Cardiovascular: Negative for chest pain. Gastrointestinal: Negative for vomiting. Musculoskeletal: Positive for arthralgias and neck pain. Skin: Negative for wound. Neurological: Positive for headaches. Negative for syncope. All other systems reviewed and are negative. Vitals:    12/07/19 1133 12/07/19 1200 12/07/19 1215 12/07/19 1230   BP: 122/51 (!) 131/109 123/47 115/41   Pulse:    87   Resp:       Temp:       SpO2: 100% 100% 100% 100%   Weight:       Height:                Physical Exam  Vitals signs and nursing note reviewed. Constitutional:       General: She is not in acute distress. Appearance: She is well-developed. Comments: Pleasant, well-appearing -American female   HENT:      Head:      Comments: No hematomas     Right Ear: External ear normal.      Left Ear: External ear normal.   Eyes:      Pupils: Pupils are equal, round, and reactive to light. Neck:      Musculoskeletal: Normal range of motion and neck supple. Comments: Diffuse cervical tenderness without focal bony midline tenderness  Cardiovascular:      Rate and Rhythm: Normal rate and regular rhythm. Heart sounds: Normal heart sounds. No murmur. No friction rub. No gallop. Pulmonary:      Effort: Pulmonary effort is normal. No respiratory distress.       Breath sounds: Normal breath sounds. No wheezing. Abdominal:      Palpations: Abdomen is soft. Tenderness: There is no tenderness. There is no guarding. Musculoskeletal: Normal range of motion. Comments: Mild left ankle tenderness without swelling or bruising  No bony midline T or L-spine tenderness   Skin:     General: Skin is warm and dry. Neurological:      Mental Status: She is alert. MDM  Number of Diagnoses or Management Options  Fall, initial encounter:   Hyperglycemia:   Diagnosis management comments: 75-year-old female presenting to the ER for mechanical fall that occurred this morning. Of note, patient was also seen for a fall a couple of days ago that had occurred a week prior. No recent illness. Patient does take Plavix. Overall, reassuring exam, vital signs, imaging. Discussed hyperglycemia on labs, patient admits that she has been very poorly compliant with her diet of late. Discussed limiting carbohydrates, increasing water intake at home, need for close primary care follow-up this week. Return precautions given. Amount and/or Complexity of Data Reviewed  Clinical lab tests: ordered and reviewed  Tests in the radiology section of CPT®: ordered and reviewed  Discuss the patient with other providers: yes (Dr. Al Hernandez, ED attending)           Procedures      EKG interpretation: 11:14  Rhythm: normal sinus rhythm; and regular .  Rate (approx.): 85; Axis: normal; Intervals: normal ; ST/T wave: normal, no STEMI; EKG documented and interpreted by Jair Fitch MD, ED MD.

## 2019-12-07 NOTE — ED TRIAGE NOTES
Drea Bake down from top of staircase this morning when foot slipped, 13 steps. Takes Plavix, daughter found her at bottom of steps with back of head on bottom step. Hit head and back. Moving all extremities.

## 2019-12-07 NOTE — DISCHARGE INSTRUCTIONS
Patient Education        Preventing Falls: Care Instructions  Your Care Instructions    Getting around your home safely can be a challenge if you have injuries or health problems that make it easy for you to fall. Loose rugs and furniture in walkways are among the dangers for many older people who have problems walking or who have poor eyesight. People who have conditions such as arthritis, osteoporosis, or dementia also have to be careful not to fall. You can make your home safer with a few simple measures. Follow-up care is a key part of your treatment and safety. Be sure to make and go to all appointments, and call your doctor if you are having problems. It's also a good idea to know your test results and keep a list of the medicines you take. How can you care for yourself at home? Taking care of yourself  · You may get dizzy if you do not drink enough water. To prevent dehydration, drink plenty of fluids, enough so that your urine is light yellow or clear like water. Choose water and other caffeine-free clear liquids. If you have kidney, heart, or liver disease and have to limit fluids, talk with your doctor before you increase the amount of fluids you drink. · Exercise regularly to improve your strength, muscle tone, and balance. Walk if you can. Swimming may be a good choice if you cannot walk easily. · Have your vision and hearing checked each year or any time you notice a change. If you have trouble seeing and hearing, you might not be able to avoid objects and could lose your balance. · Know the side effects of the medicines you take. Ask your doctor or pharmacist whether the medicines you take can affect your balance. Sleeping pills or sedatives can affect your balance. · Limit the amount of alcohol you drink. Alcohol can impair your balance and other senses. · Ask your doctor whether calluses or corns on your feet need to be removed.  If you wear loose-fitting shoes because of calluses or corns, you can lose your balance and fall. · Talk to your doctor if you have numbness in your feet. Preventing falls at home  · Remove raised doorway thresholds, throw rugs, and clutter. Repair loose carpet or raised areas in the floor. · Move furniture and electrical cords to keep them out of walking paths. · Use nonskid floor wax, and wipe up spills right away, especially on ceramic tile floors. · If you use a walker or cane, put rubber tips on it. If you use crutches, clean the bottoms of them regularly with an abrasive pad, such as steel wool. · Keep your house well lit, especially [facility], and outside walkways. Use night-lights in areas such as hallways and bathrooms. Add extra light switches or use remote switches (such as switches that go on or off when you clap your hands) to make it easier to turn lights on if you have to get up during the night. · Install sturdy handrails on stairways. · Move items in your cabinets so that the things you use a lot are on the lower shelves (about waist level). · Keep a cordless phone and a flashlight with new batteries by your bed. If possible, put a phone in each of the main rooms of your house, or carry a cell phone in case you fall and cannot reach a phone. Or, you can wear a device around your neck or wrist. You push a button that sends a signal for help. · Wear low-heeled shoes that fit well and give your feet good support. Use footwear with nonskid soles. Check the heels and soles of your shoes for wear. Repair or replace worn heels or soles. · Do not wear socks without shoes on wood floors. · Walk on the grass when the sidewalks are slippery. If you live in an area that gets snow and ice in the winter, sprinkle salt on slippery steps and sidewalks. Preventing falls in the bath  · Install grab bars and nonskid mats inside and outside your shower or tub and near the toilet and sinks. · Use shower chairs and bath benches.   · Use a hand-held shower head that will allow you to sit while showering. · Get into a tub or shower by putting the weaker leg in first. Get out of a tub or shower with your strong side first.  · Repair loose toilet seats and consider installing a raised toilet seat to make getting on and off the toilet easier. · Keep your bathroom door unlocked while you are in the shower. Where can you learn more? Go to http://www.mondragon.com/. Enter [id number] [id number] in the search box to learn more about \"Preventing Falls: Care Instructions. \"  Current as of: March 16, 2018  Content Version: 11.8  © 6771-0488 "[employer]". Care instructions adapted under license by [employer] (which disclaims liability or warranty for this information). If you have questions about a medical condition or this instruction, always ask your healthcare professional. [place] [address] any warranty or liability for your use of this information. Patient Education        Learning About High Blood Sugar  What is high blood sugar? Your body turns the food you eat into glucose (sugar), which it uses for energy. But if your body isn't able to use the sugar right away, it can build up in your blood and lead to high blood sugar. When the amount of sugar in your blood stays too high for too much of the time, you may have diabetes. Diabetes is a disease that can cause serious health problems. The good news is that lifestyle changes may help you get your blood sugar back to normal and avoid or delay diabetes. What causes high blood sugar? Sugar (glucose) can build up in your blood if you:  · Are overweight. · Have a family history of diabetes. · Take certain medicines, such as steroids. What are the symptoms? Having high blood sugar may not cause any symptoms at all. Or it may make you feel very thirsty or very hungry. You may also urinate more often than usual, have blurry vision, or lose weight without trying.   How is high blood sugar treated? You can take steps to lower your blood sugar level if you understand what makes it get higher. Your doctor may want you to learn how to test your blood sugar level at home. Then you can see how illness, stress, or different kinds of food or medicine raise or lower your blood sugar level. Other tests may be needed to see if you have diabetes. How can you prevent high blood sugar? · Watch your weight. If you're overweight, losing just a small amount of weight may help. Reducing fat around your waist is most important. · Limit the amount of calories, sweets, and unhealthy fat you eat. Ask your doctor if a dietitian can help you. A registered dietitian can help you create meal plans that fit your lifestyle. · Get at least 30 minutes of exercise on most days of the week. Exercise helps control your blood sugar. It also helps you maintain a healthy weight. Walking is a good choice. You also may want to do other activities, such as running, swimming, cycling, or playing tennis or team sports. · If your doctor prescribed medicines, take them exactly as prescribed. Call your doctor if you think you are having a problem with your medicine. You will get more details on the specific medicines your doctor prescribes. Follow-up care is a key part of your treatment and safety. Be sure to make and go to all appointments, and call your doctor if you are having problems. It's also a good idea to know your test results and keep a list of the medicines you take. Where can you learn more? Go to http://dimitry-casper.info/. Enter O108 in the search box to learn more about \"Learning About High Blood Sugar. \"  Current as of: April 16, 2019  Content Version: 12.2  © 9087-4119 [employer]. Care instructions adapted under license by [employer] (which disclaims liability or warranty for this information).  If you have questions about a medical condition or this instruction, always ask your healthcare professional. [place] [address] any warranty or liability for your use of this information.

## 2019-12-08 LAB
ATRIAL RATE: 85 BPM
CALCULATED P AXIS, ECG09: 41 DEGREES
CALCULATED R AXIS, ECG10: -11 DEGREES
CALCULATED T AXIS, ECG11: 15 DEGREES
DIAGNOSIS, 93000: NORMAL
P-R INTERVAL, ECG05: 164 MS
Q-T INTERVAL, ECG07: 392 MS
QRS DURATION, ECG06: 70 MS
QTC CALCULATION (BEZET), ECG08: 466 MS
VENTRICULAR RATE, ECG03: 85 BPM

## 2019-12-20 ENCOUNTER — OFFICE VISIT (OUTPATIENT)
Dept: NEUROLOGY | Age: 69
End: 2019-12-20

## 2019-12-20 VITALS
HEIGHT: 62 IN | BODY MASS INDEX: 28.71 KG/M2 | DIASTOLIC BLOOD PRESSURE: 52 MMHG | WEIGHT: 156 LBS | RESPIRATION RATE: 16 BRPM | SYSTOLIC BLOOD PRESSURE: 120 MMHG | OXYGEN SATURATION: 94 % | HEART RATE: 76 BPM

## 2019-12-20 DIAGNOSIS — R44.3 HALLUCINATIONS: Primary | ICD-10-CM

## 2019-12-20 DIAGNOSIS — Z86.73 HISTORY OF STROKE: ICD-10-CM

## 2019-12-20 DIAGNOSIS — R44.0 AUDITORY HALLUCINATION: ICD-10-CM

## 2019-12-20 DIAGNOSIS — R29.818 TRANSIENT NEUROLOGIC DEFICIT: ICD-10-CM

## 2019-12-20 RX ORDER — TRAZODONE HYDROCHLORIDE 50 MG/1
TABLET ORAL
Status: ON HOLD | COMMUNITY
End: 2020-03-26 | Stop reason: CLARIF

## 2019-12-20 RX ORDER — AMLODIPINE BESYLATE 10 MG/1
TABLET ORAL
COMMUNITY
Start: 2017-08-11 | End: 2020-01-28 | Stop reason: SDUPTHER

## 2019-12-20 NOTE — PATIENT INSTRUCTIONS
RESULT POLICY If we have ordered testing for you, know that; \"NO NEWS IS GOOD NEWS! \" It is our policy that we know longer call patients with results, nor do we  give test results over the phone. We schedule follow up appointments so that your results can be discussed in person. This allows you to address any questions you have regarding the results. If you choose to go to an imaging center outside of [facility], it is your responsibility to bring imaging report and disc to follow up appointment. If something of concern is revealed on your test, we will contact you to discuss the matter and if needed schedule a sooner follow up appointment. Additionally, results may be found by using the My Chart feature and one of our patient service representatives at the  can give you instructions on how to access this feature to utilize our electronic medical record system. Thank you for your understanding. PRESCRIPTION REFILL POLICY [facility] Statement to Patients April 1, 2014 In an effort to ensure the large volume of patient prescription refills is processed in the most efficient and expeditious manner, we are asking our patients to assist us by calling your Pharmacy for all prescription refills, this will include also your  Mail Order Pharmacy. The pharmacy will contact our office electronically to continue the refill process. Please do not wait until the last minute to call your pharmacy. We need at least 48 hours (2days) to fill prescriptions. We also encourage you to call your pharmacy before going to  your prescription to make sure it is ready. With regard to controlled substance prescription refill requests (narcotic refills) that need to be picked up at our office, we ask your cooperation by providing us with at least 72 hours (3days) notice that you will need a refill. We will not refill narcotic prescription refill requests after 4:00pm on any weekday, Monday through Thursday, or after 2:00pm on Fridays, or on the weekends. We encourage everyone to explore another way of getting your prescription refill request processed using [employer], our patient web portal through our electronic medical record system. [employer] is an efficient and effective way to communicate your medication request directly to the office and  downloadable as an krystal on your smart phone . [employer] also features a review functionality that allows you to view your medication list as well as leave messages for your physician. Are you ready to get connected? If so please review the attatched instructions or speak to any of our staff to get you set up right away! Thank you so much for your cooperation. Should you have any questions please contact our Practice Administrator.

## 2019-12-20 NOTE — PROGRESS NOTES
[address] [facility] and [address] at [facility] at [address] [facility] [address] [place], [address] [facility] [address] [facility], [address] [place] [address]  [phone] Office  [phone] Facsimile           Referring: Caroline Tapia MD      Chief Complaint   Patient presents with   [address] [facility] Patient    Cerebrovascular Accident     back in Sept  was seen at [facility].  2 falls since CVA, cogn. impairment, weakness in muscles. Was doing PT but since last fall, has taken a break. sleep difficulty (falling and staying asleep) since CVA. Had previous \"major stroke in 2018\" while living in [place].  Hallucinations     auditory off and on for the past yr   68-year-old right-handed lady who comes today accompanied by her daughter for evaluation of what he calls stroke. Both provide history. This nice lady is from [place] and in 2008 she was said to have had a significant stroke there was some left-sided weakness. She came to visit and was said to have had another stroke per their history where she was treated at [facility].  I do have the records from [facility] from September 2019 and she presented with left-sided numbness and weakness apparently in her MRI and per the discharge summary it was speculated that some of her symptoms may be anxiety related. In any regard the daughter says that daily this patient has episodes where she is not responsive. Sometimes her eyes will seem to roll back. Though patient also notes that she is hearing voices and she hears people telling her that they are going to sell her house in [place] and if she comes back they will kill her. They tell her they are coming to get her. They are quite frightening. In fact they told her not to come to the doctor today. They tell her she is worthless. She is on some Seroquel but that not yet helped these at all. She is not had a amilcar convulsion.   Daughter notes that the episodes will occur and they can tell when it is going to happen. She becomes unresponsive. Again the eyes tend to roll back she does not have any abnormal movements. She does not respond. She can be this way for minutes to hours and then she is completely normal.  She has not had an EEG. She did take a fall recently and was seen in emergency department where she had a head CT which I reviewed personally and that was normal.  When asked why she had the stroke in [facility] they tell me stress. They deny any issues with cardiac etiology. She does have diabetes and hypertension. Also has arthritis. She is on Plavix presently. She is not had any recent fever chills rash. No loss of vision. No vertigo. Does endorse anxiety and depression. She notes she is under a good amount of stress due to family issues. Past Medical History:   Diagnosis Date    Anxiety     Auditory hallucinations     Depression     Diabetes ([facility]     Esophageal reflux     Hypertension     Stroke ([facility] 01/2018       History reviewed. No pertinent surgical history. Current Outpatient Medications   Medication Sig Dispense Refill    insulin detemir U-100 (LEVEMIR U-100 INSULIN) 100 unit/mL injection 60 Units by SubCUTAneous route nightly for 90 days. 36 mL 2    metFORMIN ER (GLUCOPHAGE XR) 500 mg tablet Take 2 tabs po with breakfast daily 60 Tab 2    clopidogrel (PLAVIX) 75 mg tab Take 1 Tab by mouth daily. 30 Tab 2    QUEtiapine (SEROQUEL) 50 mg tablet Take 1 Tab by mouth two (2) times a day. 60 Tab 2    lisinopril (PRINIVIL, ZESTRIL) 30 mg tablet Take 30 mg by mouth daily.  propranolol (INDERAL) 40 mg tablet Take  by mouth four (4) times daily.  rosuvastatin (CRESTOR) 20 mg tablet Take 20 mg by mouth nightly.  sertraline HCl (SERTRALINE PO) Take  by mouth.           No Known Allergies    Social History     Tobacco Use    Smoking status: Never Smoker    Smokeless tobacco: Never Used   Substance Use Topics    Alcohol use: Not Currently    Drug use: Not Currently       History reviewed. No pertinent family history. Review of Systems  Pertinent positives and negatives as noted with remainder of comprehensive review negative      Examination  Visit Vitals  Ht 5' 2\" (1.575 m)   Wt 70.8 kg (156 lb)   BMI 28.53 kg/m²     Pleasant, well appearing. Dress and grooming are appropriate. No scleral icterus is present. Oropharynx is clear. Supple neck without bruit appreciated. Heart regular. Pulses are symmetrical.  No edema in the lower extremities. Neurologically, she is awake, alert, and oriented with normal speech and language. Her cognition is normal.    Intact cranial nerves 2-12. No nystagmus. Visual fields full to confrontation. Disk margins are flat bilaterally. She has normal bulk and tone. She has no abnormal movement. She has no pronation or drift. She generates full strength in the upper and lower extremities to direct confrontational testing. Reflexes are symmetrical in the upper and lower extremities bilaterally. No pathologic reflexes are elicited. .  Finger nose finger and rapid alternating movements are normal.  Steady gait. No sensory deficit to primary modalities. Impression/Plan  75-year-old lady who was said to have had a stroke in [facility] with resultant left-sided weakness although her examination not demonstrate such. Her reflexes are brisk throughout. More concerning are the auditory hallucinations as noted above and also the spells of decreased awareness and differential certainly would include supratentorial etiology versus ictus particularly if she did indeed have a cortical stroke. At this juncture maintain her current medications as I do not know her well enough to start changing things. We will get an EEG carotid Doppler and formal neuropsychological evaluation. Get a MARINA as well. We will send for records from [facility].   Follow after studies    Farhat CARRANZA MD [place]    This note was created using voice recognition software. Despite editing, there may be syntax errors. This note will not be viewable in [address]

## 2019-12-23 ENCOUNTER — OFFICE VISIT (OUTPATIENT)
Dept: NEUROLOGY | Age: 69
End: 2019-12-23

## 2019-12-23 DIAGNOSIS — R44.3 HALLUCINATIONS: Primary | ICD-10-CM

## 2019-12-25 NOTE — PROCEDURES
EEG:      Date:  12/23/19    Requesting Physician:  Harriet Reyes. MD [place]    An EEG is requested in this 68year old woman because of altered awareness to evaluate for epileptiform abnormality. Medications:  Medications said to include insulin, Metformin, Plavix, Seroquel, Prinivil, Crestor and Zoloft. This tracing is obtained during the awake state. During wakefulness there are intermittent runs of posteriorly-dominant and symmetrical low-to-medium amplitude 8 cycle per second activities which attenuate with eye opening. Lower-voltage faster-frequency activities are seen symmetrically over the anterior head regions. Intermittent photic stimulation little alters the tracing. Sleep is not attained. Interpretation: This EEG recorded during the awake state is normal.  No epileptiform abnormalities are seen.

## 2019-12-30 ENCOUNTER — OFFICE VISIT (OUTPATIENT)
Dept: INTERNAL MEDICINE CLINIC | Age: 69
End: 2019-12-30

## 2019-12-30 VITALS
SYSTOLIC BLOOD PRESSURE: 132 MMHG | HEART RATE: 86 BPM | RESPIRATION RATE: 16 BRPM | HEIGHT: 62 IN | DIASTOLIC BLOOD PRESSURE: 69 MMHG | BODY MASS INDEX: 27.79 KG/M2 | TEMPERATURE: 98 F | WEIGHT: 151 LBS

## 2019-12-30 DIAGNOSIS — R26.81 UNSTABLE GAIT: ICD-10-CM

## 2019-12-30 DIAGNOSIS — S01.81XA LACERATION OF FOREHEAD, INITIAL ENCOUNTER: ICD-10-CM

## 2019-12-30 DIAGNOSIS — F03.90 DEMENTIA WITHOUT BEHAVIORAL DISTURBANCE, UNSPECIFIED DEMENTIA TYPE: ICD-10-CM

## 2019-12-30 DIAGNOSIS — R29.6 FREQUENT FALLS: ICD-10-CM

## 2019-12-30 NOTE — PROGRESS NOTES
Subjective:      Mary Stephen is a 68 y.o. female who presents today for follow up  Chief Complaint   Patient presents with    Diabetes   she is accompanied today by her grandson    Last A1c 11.6  Was given lab orders at last visit- not done  Says morning glucose has been in 200s  Taking metformin  Compliant with insulin- however patient states she never adjusted her insulin as advised (titrating up)  She tries to follow a low carb diet    Hypertension  Well controlled on current therapy    Hyperlipidemia  Compliant with statin    Patient fell in early December and was evaluated in ER  She also says she fell about 2 weeks ago, She says she hit her head and left leg. She had an abrasion on forehead but did not lose consciousness. In regards to memory loss and hallucination she has seen neurologist and work up is pending        There are no active problems to display for this patient. Current Outpatient Medications   Medication Sig Dispense Refill    amLODIPine (NORVASC) 10 mg tablet amlodipine 10 mg tablet      traZODone (DESYREL) 50 mg tablet Take  by mouth nightly.  insulin detemir U-100 (LEVEMIR U-100 INSULIN) 100 unit/mL injection 60 Units by SubCUTAneous route nightly for 90 days. 36 mL 2    metFORMIN ER (GLUCOPHAGE XR) 500 mg tablet Take 2 tabs po with breakfast daily 60 Tab 2    clopidogrel (PLAVIX) 75 mg tab Take 1 Tab by mouth daily. 30 Tab 2    QUEtiapine (SEROQUEL) 50 mg tablet Take 1 Tab by mouth two (2) times a day. 60 Tab 2    lisinopril (PRINIVIL, ZESTRIL) 30 mg tablet Take 30 mg by mouth daily.  propranolol (INDERAL) 40 mg tablet Take 40 mg by mouth two (2) times a day. Indications: tremor      rosuvastatin (CRESTOR) 20 mg tablet Take 20 mg by mouth nightly.  sertraline (ZOLOFT) 50 mg tablet Take  by mouth.  Take 0.5 tablet po in the morning and one tablet po at bedtime       No Known Allergies  Past Medical History:   Diagnosis Date    Anxiety     Auditory hallucinations     Depression     [facility] ([facility]     Esophageal reflux     Hypertension     [facility]2018     History reviewed. No pertinent surgical history. No family history on file. Social History     Tobacco Use    Smoking status: Never Smoker    Smokeless tobacco: Never Used   Substance Use Topics    Alcohol use: Not Currently        Review of Systems    A comprehensive review of systems was negative except for that written in the HPI. Objective:     Visit Vitals  /69   Pulse 86   Temp 98 °F (36.7 °C) (Oral)   Resp 16   Ht 5' 2\" (1.575 m)   Wt 151 lb (68.5 kg)   BMI 27.62 kg/m²     General:  Alert, cooperative, no distress, appears stated age. Head:  Normocephalic, healed linear abrasion on forehead   Eyes:  Conjunctivae/corneas clear. PERRL, EOMs intact. Fundi benign. Ears:  Normal TMs and external ear canals both ears. Nose: Nares normal. Septum midline. Mucosa normal. No drainage or sinus tenderness. Throat: Lips, mucosa, and tongue normal. Teeth and gums normal.   Neck: Supple, symmetrical, trachea midline, no adenopathy, thyroid: no enlargement/tenderness/nodules, no carotid bruit and no JVD. Back:   Symmetric, no curvature. ROM normal. No CVA tenderness. Lungs:   Clear to auscultation bilaterally. Chest wall:  No tenderness or deformity. Heart:  Regular rate and rhythm, S1, S2 normal, no murmur, click, rub or gallop. Abdomen:   Soft, non-tender. Bowel sounds normal. No masses,  No organomegaly. Extremities: Extremities normal, atraumatic, no cyanosis or edema. Pulses: 2+ and symmetric all extremities. Assessment/Plan:       ICD-10-CM ICD-9-CM    1. Type II diabetes mellitus with complication, uncontrolled (HCC) E11.8 250.92 REFERRAL TO ENDOCRINOLOGY    E11.65  REFERRAL TO HOME HEALTH   2. Frequent falls R29.6 V15.88 REFERRAL TO [address]   3. Unstable gait R26.81 781.2 REFERRAL TO HOME HEALTH   4.  Dementia without behavioral disturbance, unspecified dementia type ([facility] F03.90 294.20 [facility]    Neurology work up is pending   5. Laceration of forehead, initial encounter S01.81XA 873.42 [address] her to call back or return to office if symptoms worsen/change/persist.  Discussed expected course/resolution/complications of diagnosis in detail with patient. Medication risks/benefits/costs/interactions/alternatives discussed with patient. She was given an after visit summary which includes diagnoses, current medications, & vitals. She expressed understanding with the diagnosis and plan.

## 2019-12-30 NOTE — PROGRESS NOTES
Chief Complaint   Patient presents with    Diabetes     1. Have you been to the ER, urgent care clinic since your last visit? Hospitalized since your last visit? No    2. Have you seen or consulted any other health care providers outside of the [address] [place] [address] since your last visit? Include any pap smears or colon screening.  No\

## 2019-12-31 LAB
ALBUMIN SERPL-MCNC: 4 G/DL (ref 3.5–4.8)
ALBUMIN/CREAT UR: 36 MG/G CREAT (ref 0–30)
ALBUMIN/GLOB SERPL: 1 {RATIO} (ref 1.2–2.2)
ALP SERPL-CCNC: 158 IU/L (ref 39–117)
ALT SERPL-CCNC: 39 IU/L (ref 0–32)
APPEARANCE UR: CLEAR
AST SERPL-CCNC: 86 IU/L (ref 0–40)
BACTERIA #/AREA URNS HPF: NORMAL /[HPF]
BASOPHILS # BLD AUTO: 0 X10E3/UL (ref 0–0.2)
BASOPHILS NFR BLD AUTO: 1 %
BILIRUB SERPL-MCNC: 0.7 MG/DL (ref 0–1.2)
BILIRUB UR QL STRIP: NEGATIVE
BUN SERPL-MCNC: 15 MG/DL (ref 8–27)
BUN/CREAT SERPL: 16 (ref 12–28)
CALCIUM SERPL-MCNC: 9.8 MG/DL (ref 8.7–10.3)
CASTS URNS QL MICRO: NORMAL /LPF
CHLORIDE SERPL-SCNC: 103 MMOL/L (ref 96–106)
CHOLEST SERPL-MCNC: 191 MG/DL (ref 100–199)
CO2 SERPL-SCNC: 20 MMOL/L (ref 20–29)
COLOR UR: YELLOW
CREAT SERPL-MCNC: 0.93 MG/DL (ref 0.57–1)
CREAT UR-MCNC: 121.4 MG/DL
EOSINOPHIL # BLD AUTO: 0.2 X10E3/UL (ref 0–0.4)
EOSINOPHIL NFR BLD AUTO: 3 %
EPI CELLS #/AREA URNS HPF: NORMAL /HPF (ref 0–10)
ERYTHROCYTE [DISTWIDTH] IN BLOOD BY AUTOMATED COUNT: 14.4 % (ref 12.3–15.4)
GLOBULIN SER CALC-MCNC: 3.9 G/DL (ref 1.5–4.5)
GLUCOSE SERPL-MCNC: ABNORMAL MG/DL
GLUCOSE UR QL: NEGATIVE
HCT VFR BLD AUTO: 30.1 % (ref 34–46.6)
HDLC SERPL-MCNC: 84 MG/DL
HGB BLD-MCNC: 10 G/DL (ref 11.1–15.9)
HGB UR QL STRIP: NEGATIVE
IMM GRANULOCYTES # BLD AUTO: 0 X10E3/UL (ref 0–0.1)
IMM GRANULOCYTES NFR BLD AUTO: 0 %
KETONES UR QL STRIP: NEGATIVE
LDLC SERPL CALC-MCNC: 70 MG/DL (ref 0–99)
LEUKOCYTE ESTERASE UR QL STRIP: ABNORMAL
LYMPHOCYTES # BLD AUTO: 2.8 X10E3/UL (ref 0.7–3.1)
LYMPHOCYTES NFR BLD AUTO: 49 %
MCH RBC QN AUTO: 29.9 PG (ref 26.6–33)
MCHC RBC AUTO-ENTMCNC: 33.2 G/DL (ref 31.5–35.7)
MCV RBC AUTO: 90 FL (ref 79–97)
MICRO URNS: ABNORMAL
MICROALBUMIN UR-MCNC: 43.7 UG/ML
MONOCYTES # BLD AUTO: 0.4 X10E3/UL (ref 0.1–0.9)
MONOCYTES NFR BLD AUTO: 8 %
MUCOUS THREADS URNS QL MICRO: PRESENT
NEUTROPHILS # BLD AUTO: 2.2 X10E3/UL (ref 1.4–7)
NEUTROPHILS NFR BLD AUTO: 39 %
NITRITE UR QL STRIP: NEGATIVE
PH UR STRIP: 5.5 [PH] (ref 5–7.5)
PLATELET # BLD AUTO: 199 X10E3/UL (ref 150–450)
POTASSIUM SERPL-SCNC: ABNORMAL MMOL/L
PROT SERPL-MCNC: 7.9 G/DL (ref 6–8.5)
PROT UR QL STRIP: NEGATIVE
RBC # BLD AUTO: 3.34 X10E6/UL (ref 3.77–5.28)
RBC #/AREA URNS HPF: NORMAL /HPF (ref 0–2)
RPR SER QL: NON REACTIVE
SODIUM SERPL-SCNC: 143 MMOL/L (ref 134–144)
SP GR UR: 1.02 (ref 1–1.03)
TRIGL SERPL-MCNC: 184 MG/DL (ref 0–149)
TSH SERPL DL<=0.005 MIU/L-ACNC: 2.86 UIU/ML (ref 0.45–4.5)
UROBILINOGEN UR STRIP-MCNC: 1 MG/DL (ref 0.2–1)
VIT B12 SERPL-MCNC: 1181 PG/ML (ref 232–1245)
VLDLC SERPL CALC-MCNC: 37 MG/DL (ref 5–40)
WBC # BLD AUTO: 5.6 X10E3/UL (ref 3.4–10.8)
WBC #/AREA URNS HPF: NORMAL /HPF (ref 0–5)

## 2020-01-02 ENCOUNTER — TELEPHONE (OUTPATIENT)
Dept: NEUROLOGY | Age: 70
End: 2020-01-02

## 2020-01-02 DIAGNOSIS — R44.3 HALLUCINATIONS: Primary | ICD-10-CM

## 2020-01-02 DIAGNOSIS — R44.0 AUDITORY HALLUCINATION: ICD-10-CM

## 2020-01-02 RX ORDER — QUETIAPINE FUMARATE 50 MG/1
50 TABLET, FILM COATED ORAL 2 TIMES DAILY
Qty: 60 TAB | Refills: 2 | Status: SHIPPED | OUTPATIENT
Start: 2020-01-02 | End: 2020-04-22

## 2020-01-02 NOTE — TELEPHONE ENCOUNTER
----- Message from Krystal Rider sent at 1/2/2020 12:54 PM EST -----  Regarding: Dr. Luisa Moser first and last name: red Holcomb    Reason for call: Appt     Callback required yes/no and why: Yes to be seen     Best contact number(s): [phone]    Details to clarify the Authrhona Hamilton , daughter ,would like a call back to have Pt schedule for appt for referral by Dr. Carlos Hudson  for hallucination after stroke and  alzheimer's disease. Dr. Favio Manrique would like pt to be seen as soon as possible.        rKystal Rider

## 2020-01-02 NOTE — TELEPHONE ENCOUNTER
Pt's daughter states pt is having very vivid auditory hallucinations that are \"threatening\". Pt today is actually arguing with \"the voices\". This is extremely stressful for pt and daughter is very concerned that this will increase her chances of having another stroke. She did say pt had run out of the seroquel 50 mg recently and wonders if this may be the cause.

## 2020-01-02 NOTE — TELEPHONE ENCOUNTER
Ms Pardo Liter calling for Pt [Hippa Verified] to request a call back in ref to pt having \"Strong Hallucinations\" Please advise  Best # [phone]

## 2020-01-03 RX ORDER — SERTRALINE HYDROCHLORIDE 50 MG/1
50 TABLET, FILM COATED ORAL DAILY
Qty: 90 TAB | Refills: 0 | Status: ON HOLD | OUTPATIENT
Start: 2020-01-03 | End: 2020-03-27 | Stop reason: CLARIF

## 2020-01-10 ENCOUNTER — OFFICE VISIT (OUTPATIENT)
Dept: NEUROLOGY | Age: 70
End: 2020-01-10

## 2020-01-10 DIAGNOSIS — R44.3 HALLUCINATIONS: ICD-10-CM

## 2020-01-10 DIAGNOSIS — R44.0 AUDITORY HALLUCINATION: Primary | ICD-10-CM

## 2020-01-10 NOTE — PROGRESS NOTES
EMG/ NCS Report  [facility], [address]  Ph: [phone]/ [phone]  FAX: [phone]/ [phone]  Test Date:  1/10/2020    Patient: Faraz Sarmiento : 1950 Physician: Ramya Lacy   Sex: Female Height: ' \" Ref Phys: Sandee Fischer MD   ID#: [id number] Weight:  lbs. Technician: Ibrahima Mccord     Patient History / Exam:  CC:HALLUINATION          EMG & NCV Findings:    Impression:        ___________________________  LORENZO RICO MD              AEP     Trial I (ms) II (ms) III (ms) IV (ms) V (ms) I-III (ms) III-V (ms) I-V (ms) V-Va (µV) I-Ia (µV) V-Va/I-Ia   Norm <2.0  <4.5  <6.2 <2.4 <2.3 <4.5      *Trial2 - L 1.41 2.45 3.63 4.63 6.09 2.22 2.46 4.68 0.17 0.25 0.68   *Trial4 - R 1.48 2.63 3.69 4.63 6.02 2.21 2.33 4.54 0.27 0.43 0.63   L-R Norm      <0.28 <0.32 <0.33      L-R 0.07 0.18 0.06 0.00 0.07 0.01 0.13 0.14 0.10 0.18 0.05   *Data was acquired using Inserts, 1.00 millisecond(s) has been subtracted from cursor values.                Waveforms:

## 2020-01-13 NOTE — PROCEDURES
[facility] [place]   Brainstem Auditory Evoked Potential Report    Date of Service: 1/10/2020  Referring: Dr. Dillon Mullen  Indication: Auditory hallucination    Bilateral brainstem auditory evoked potential evoked potential is performed. Waveforms are appropriate for interpretation. Absolute latency of the waveforms are  normal bilaterally.     Normal Study    Mickey Day MD

## 2020-01-28 RX ORDER — AMLODIPINE BESYLATE 10 MG/1
TABLET ORAL
Qty: 90 TAB | Refills: 1 | Status: SHIPPED | OUTPATIENT
Start: 2020-01-28 | End: 2020-07-29

## 2020-01-29 ENCOUNTER — TELEPHONE (OUTPATIENT)
Dept: NEUROLOGY | Age: 70
End: 2020-01-29

## 2020-01-29 DIAGNOSIS — R26.81 UNSTABLE GAIT: ICD-10-CM

## 2020-01-29 DIAGNOSIS — R29.6 FREQUENT FALLS: Primary | ICD-10-CM

## 2020-01-30 ENCOUNTER — OFFICE VISIT (OUTPATIENT)
Dept: NEUROLOGY | Age: 70
End: 2020-01-30

## 2020-01-30 DIAGNOSIS — F03.90 MAJOR NEUROCOGNITIVE DISORDER DUE TO ALZHEIMER'S DISEASE, PROBABLE, WITHOUT BEHAVIORAL DISTURBANCE: Primary | ICD-10-CM

## 2020-01-30 DIAGNOSIS — F32.A ANXIETY AND DEPRESSION: ICD-10-CM

## 2020-01-30 DIAGNOSIS — F41.9 ANXIETY AND DEPRESSION: ICD-10-CM
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
## 2020-01-30 NOTE — TELEPHONE ENCOUNTER
rcvd call from [facility] that PA request was denied. Agent said that pt cannot see any providers outside of the state of [place].

## 2020-01-31 ENCOUNTER — OFFICE VISIT (OUTPATIENT)
Dept: INTERNAL MEDICINE CLINIC | Age: 70
End: 2020-01-31

## 2020-01-31 VITALS
SYSTOLIC BLOOD PRESSURE: 102 MMHG | WEIGHT: 150 LBS | DIASTOLIC BLOOD PRESSURE: 54 MMHG | HEART RATE: 78 BPM | BODY MASS INDEX: 27.6 KG/M2 | TEMPERATURE: 97.9 F | HEIGHT: 62 IN | RESPIRATION RATE: 16 BRPM

## 2020-01-31 DIAGNOSIS — R04.0 EPISTAXIS: ICD-10-CM

## 2020-01-31 DIAGNOSIS — R51.9 CHRONIC INTRACTABLE HEADACHE, UNSPECIFIED HEADACHE TYPE: ICD-10-CM

## 2020-01-31 DIAGNOSIS — R29.6 FREQUENT FALLS: ICD-10-CM

## 2020-01-31 DIAGNOSIS — L02.811 SCALP ABSCESS: ICD-10-CM

## 2020-01-31 DIAGNOSIS — H91.93 DECREASED HEARING OF BOTH EARS: Primary | ICD-10-CM

## 2020-01-31 DIAGNOSIS — G89.29 CHRONIC INTRACTABLE HEADACHE, UNSPECIFIED HEADACHE TYPE: ICD-10-CM

## 2020-01-31 RX ORDER — CEPHALEXIN 500 MG/1
500 CAPSULE ORAL 4 TIMES DAILY
Qty: 40 CAP | Refills: 0 | Status: SHIPPED | OUTPATIENT
Start: 2020-01-31 | End: 2020-02-10

## 2020-01-31 RX ORDER — SULFAMETHOXAZOLE AND TRIMETHOPRIM 800; 160 MG/1; MG/1
1 TABLET ORAL 2 TIMES DAILY
Qty: 20 TAB | Refills: 0 | Status: SHIPPED | OUTPATIENT
Start: 2020-01-31 | End: 2020-01-31 | Stop reason: CLARIF

## 2020-01-31 NOTE — PROGRESS NOTES
Chief Complaint   Patient presents with   Carol Coronado     fell two weeks ago hit her head reports that when she moves her head there is pain.  Hair/Scalp Problem     sore in her scalp from her scratching. 1. Have you been to the ER, urgent care clinic since your last visit? Hospitalized since your last visit? No    2. Have you seen or consulted any other health care providers outside of the [address] [place] [address] since your last visit? Include any pap smears or colon screening. No     Called pharmacy and cancelled bactrim per Dr. Mildred Rey and they will fill keflex.  Hanane Bates LPN

## 2020-01-31 NOTE — PROGRESS NOTES
Subjective:      Hugo Cuevas is a 68 y.o. female who presents today for   Chief Complaint   Patient presents with   Ramona Maria Fall     fell two weeks ago hit her head reports that when she moves her head there is pain.  Hair/Scalp Problem     sore in her scalp from her scratching. patient in today for follow up and evaluation and she is accompanied by her daughter    She saw neurologist yesterday. She will be scheduled for neuropsych testing. Diagnosis likely alzheimers and depression/anxiety    Her daughter states a few weeks ago patient started overusing a relaxer. She has some sores that are now oozing on her head  Her daughter has been using neosporin on the affected areas. . patient states areas were itching at first then started scabbing over    Patient says she fell about 2 weeks ago when she was playing with her grandchild. Hit front of head. She says he head still is sore after the fall. She denies any loc    She says she has noticed nosebleeds every time she blows her nose. She does use nasonex frequently    She also reports a history of diminished hearing and requests to see ENT      There are no active problems to display for this patient. Current Outpatient Medications   Medication Sig Dispense Refill    amLODIPine (NORVASC) 10 mg tablet amlodipine 10 mg tablet 90 Tab 1    sertraline (ZOLOFT) 50 mg tablet Take 1 Tab by mouth daily. Take 0.5 tablet po in the morning and one tablet po at bedtime 90 Tab 0    QUEtiapine (SEROQUEL) 50 mg tablet Take 1 Tab by mouth two (2) times a day. 60 Tab 2    traZODone (DESYREL) 50 mg tablet Take  by mouth nightly.  insulin detemir U-100 (LEVEMIR U-100 INSULIN) 100 unit/mL injection 60 Units by SubCUTAneous route nightly for 90 days. 36 mL 2    metFORMIN ER (GLUCOPHAGE XR) 500 mg tablet Take 2 tabs po with breakfast daily 60 Tab 2    clopidogrel (PLAVIX) 75 mg tab Take 1 Tab by mouth daily.  30 Tab 2    lisinopril (PRINIVIL, ZESTRIL) 30 mg tablet Take 30 mg by mouth daily.  propranolol (INDERAL) 40 mg tablet Take 40 mg by mouth two (2) times a day. Indications: tremor      rosuvastatin (CRESTOR) 20 mg tablet Take 20 mg by mouth nightly. No Known Allergies  Past Medical History:   Diagnosis Date    Anxiety     Auditory hallucinations     Depression     Diabetes ([facility]     Esophageal reflux     Hypertension     Stroke ([facility] 01/2018     No past surgical history on file. No family history on file. Social History     Tobacco Use    Smoking status: Never Smoker    Smokeless tobacco: Never Used   Substance Use Topics    Alcohol use: Not Currently        Review of Systems    A comprehensive review of systems was negative except for that written in the HPI. Objective:     Visit Vitals  /54   Pulse 78   Temp 97.9 °F (36.6 °C) (Oral)   Resp 16   Ht 5' 2\" (1.575 m)   Wt 150 lb (68 kg)   BMI 27.44 kg/m²     General:  Alert, cooperative, no distress, appears stated age. Head:  Normocephalic, without obvious abnormality, atraumatic. Eyes:  Conjunctivae/corneas clear. PERRL, EOMs intact. .   Ears:  Normal TMs and external ear canals both ears. Nose: Nares normal. Septum midline. Mucosa normal. No drainage or sinus tenderness. Throat: Lips, mucosa, and tongue normal. Teeth and gums normal.   Neck: Supple, symmetrical, trachea midline, no adenopathy, thyroid: no enlargement/tenderness/nodules, no carotid bruit and no JVD. Back:   Symmetric, no curvature. ROM normal. No CVA tenderness. Lungs:   Clear to auscultation bilaterally. Chest wall:  No tenderness or deformity. Heart:  Regular rate and rhythm, S1, S2 normal, no murmur, click, rub or gallop. Abdomen:   Soft, non-tender. Bowel sounds normal. No masses,  No organomegaly. Extremities: Extremities normal, atraumatic, no cyanosis or edema. Pulses: 2+ and symmetric all extremities. Skin: Skin color, texture, turgor normal. No rashes or lesions.    Lymph nodes: Cervical, supraclavicular, and axillary nodes normal.   Neurologic: CNII-XII intact. Normal strength, sensation and reflexes throughout. Assessment/Plan:       ICD-10-CM ICD-9-CM    1. Decreased hearing of both ears H91.93 389.9 REFERRAL TO ENT-OTOLARYNGOLOGY   2. Epistaxis R04.0 784.7 Nose bleeds- stop frequent use of nasonex   3. Frequent falls R29.6 V15.88 REFERRAL TO PHYSICAL THERAPY   4. Scalp abscess L02.811 682.8 Scalp sores- likely abscess after using a relaxer   Stop neosporin, try bacitracin, warm compresses, will provide script for antibiotics 10 day course       5. Chronic intractable headache, unspecified headache type R51 784.0 recent fall and persistent headache- needs Head CT w/o contrast, discussed with daughter. They would rather go throught the ER               Advised her to call back or return to office if symptoms worsen/change/persist.  Discussed expected course/resolution/complications of diagnosis in detail with patient. Medication risks/benefits/costs/interactions/alternatives discussed with patient. She was given an after visit summary which includes diagnoses, current medications, & vitals. She expressed understanding with the diagnosis and plan.

## 2020-02-09 ENCOUNTER — HOSPITAL ENCOUNTER (EMERGENCY)
Age: 70
Discharge: HOME | End: 2020-02-10
Attending: EMERGENCY MEDICINE | Admitting: EMERGENCY MEDICINE
Payer: MEDICARE

## 2020-02-09 DIAGNOSIS — L02.811 SCALP ABSCESS: Primary | ICD-10-CM

## 2020-02-09 PROCEDURE — 99282 EMERGENCY DEPT VISIT SF MDM: CPT

## 2020-02-09 PROCEDURE — 75810000289 HC I&D ABSCESS SIMP/COMP/MULT

## 2020-02-09 PROCEDURE — 74011000250 HC RX REV CODE- 250: Performed by: EMERGENCY MEDICINE

## 2020-02-09 RX ORDER — LIDOCAINE HYDROCHLORIDE AND EPINEPHRINE 10; 10 MG/ML; UG/ML
1.5 INJECTION, SOLUTION INFILTRATION; PERINEURAL ONCE
Status: COMPLETED | OUTPATIENT
Start: 2020-02-09 | End: 2020-02-09

## 2020-02-09 RX ADMIN — LIDOCAINE HYDROCHLORIDE,EPINEPHRINE BITARTRATE 15 MG: 10; .01 INJECTION, SOLUTION INFILTRATION; PERINEURAL at 23:30

## 2020-02-10 VITALS
RESPIRATION RATE: 16 BRPM | HEART RATE: 88 BPM | TEMPERATURE: 98.5 F | DIASTOLIC BLOOD PRESSURE: 78 MMHG | BODY MASS INDEX: 31.41 KG/M2 | WEIGHT: 160 LBS | OXYGEN SATURATION: 97 % | HEIGHT: 60 IN | SYSTOLIC BLOOD PRESSURE: 126 MMHG

## 2020-02-10 PROCEDURE — 74011250637 HC RX REV CODE- 250/637: Performed by: EMERGENCY MEDICINE

## 2020-02-10 PROCEDURE — 87205 SMEAR GRAM STAIN: CPT

## 2020-02-10 PROCEDURE — 87147 CULTURE TYPE IMMUNOLOGIC: CPT

## 2020-02-10 PROCEDURE — 87077 CULTURE AEROBIC IDENTIFY: CPT

## 2020-02-10 PROCEDURE — 75810000289 HC I&D ABSCESS SIMP/COMP/MULT

## 2020-02-10 PROCEDURE — 87186 SC STD MICRODIL/AGAR DIL: CPT

## 2020-02-10 RX ORDER — ONDANSETRON 4 MG/1
8 TABLET, ORALLY DISINTEGRATING ORAL
Status: COMPLETED | OUTPATIENT
Start: 2020-02-10 | End: 2020-02-10

## 2020-02-10 RX ADMIN — ONDANSETRON 8 MG: 4 TABLET, ORALLY DISINTEGRATING ORAL at 00:37

## 2020-02-10 NOTE — ED PROVIDER NOTES
Patient is a 75-year-old female with a past medical history significant for anxiety, auditory hallucination, diabetes, depression, GERD, hypertension and stroke recent diagnosis of a scalp abscess currently on Keflex who presents to the ED with a complaint of increased pain and swelling and tenderness of the scalp that began 24 hours ago he has been progressively worse. Patient states that she saw her PCP and was told to the emergency room if symptoms worsen. She denies any fever, sore throat, cough, congestion, neck or back pain, chest pain, shortness of breath, vomiting, abdominal pain, dizziness, extremity weakness numbness the patient appears hemodynamically stable. Had tetanus injection is up-to-date. Past Medical History:   Diagnosis Date    Anxiety     Auditory hallucinations     Depression     [facility] ([facility]     Esophageal reflux     Hypertension     Stroke ([facility] 01/2018       No past surgical history on file. No family history on file.     Social History     Socioeconomic History    Marital status:      Spouse name: Not on file    Number of children: Not on file    Years of education: Not on file    Highest education level: Not on file   Occupational History    Not on file   Social Needs    Financial resource strain: Not on file    Food insecurity:     Worry: Not on file     Inability: Not on file    Transportation needs:     Medical: Not on file     Non-medical: Not on file   Tobacco Use    Smoking status: Never Smoker    Smokeless tobacco: Never Used   Substance and Sexual Activity    Alcohol use: Not Currently    Drug use: Not Currently    Sexual activity: Not Currently   Lifestyle    Physical activity:     Days per week: Not on file     Minutes per session: Not on file    Stress: Not on file   Relationships    Social connections:     Talks on phone: Not on file     Gets together: Not on file     Attends Mandaeism service: Not on file     Active member of club or organization: Not on file     Attends meetings of clubs or organizations: Not on file     Relationship status: Not on file    Intimate partner violence:     Fear of current or ex partner: Not on file     Emotionally abused: Not on file     Physically abused: Not on file     Forced sexual activity: Not on file   Other Topics Concern    Not on file   Social History Narrative    Not on file         ALLERGIES: Patient has no known allergies. Review of Systems   All other systems reviewed and are negative. Vitals:    02/09/20 2120 02/10/20 0030   BP: 124/54 126/78   Pulse: 95 88   Resp: 15 16   Temp: 98.5 °F (36.9 °C)    SpO2: 98% 97%   Weight: 72.6 kg (160 lb)    Height: 5' (1.524 m)             Physical Exam  Vitals signs and nursing note reviewed. Exam conducted with a chaperone present. CONSTITUTIONAL: Well-appearing; well-nourished; in mild distress  HEAD: Normocephalic; atraumatic  EYES: PERRL; EOM intact; conjunctiva and sclera are clear bilaterally. ENT: No rhinorrhea; normal pharynx with no tonsillar hypertrophy; mucous membranes pink/moist, no erythema, no exudate. NECK: Supple; non-tender; no cervical lymphadenopathy  CARD: Normal S1, S2; no murmurs, rubs, or gallops. Regular rate and rhythm. RESP: Normal respiratory effort; breath sounds clear and equal bilaterally; no wheezes, rhonchi, or rales. ABD: Normal bowel sounds; non-distended; non-tender; no palpable organomegaly, no masses, no bruits. Back Exam: Normal inspection; no vertebral point tenderness, no CVA tenderness. Normal range of motion. EXT: Normal ROM in all four extremities; non-tender to palpation; no swelling or deformity; distal pulses are normal, no edema. SKIN: Warm; dry; 2 cm in circumference tender indurated area frontoparietal scalp consistent with abscess.   NEURO:Alert and oriented x 3, coherent, JEREMY-XII grossly intact, sensory and motor are non-focal.        MDM  Number of Diagnoses or Management Options  Scalp abscess:   Diagnosis management comments: Assessment: scalp abscess with surrounding cellulitis    Plan: Education, reassurance, symptomatic treatment/incision and drainage/wound care/serial exam monitor and Reevaluate. Amount and/or Complexity of Data Reviewed  Clinical lab tests: ordered and reviewed  Tests in the radiology section of CPT®: ordered and reviewed  Tests in the medicine section of CPT®: reviewed and ordered  Discussion of test results with the performing providers: yes  Decide to obtain previous medical records or to obtain history from someone other than the patient: yes  Obtain history from someone other than the patient: yes  Review and summarize past medical records: yes  Discuss the patient with other providers: yes  Independent visualization of images, tracings, or specimens: yes           I&D Abcess Complex  Date/Time: 2/10/2020 8:56 AM  Performed by: Tk Castorena MD  Authorized by: Tk Castorena MD     Consent:     Consent obtained:  Verbal    Consent given by:  Patient    Risks discussed:  Bleeding, incomplete drainage and pain    Alternatives discussed:  No treatment  Location:     Type:  Abscess    Size:  2cm in circumference    Location: scalp. Pre-procedure details:     Skin preparation:  Betadine and Chloraprep  Anesthesia (see MAR for exact dosages): Anesthesia method:  Local infiltration    Local anesthetic:  Lidocaine 1% WITH epi  Procedure type:     Complexity:  Complex  Procedure details:     Needle aspiration: no      Incision types:  Stab incision    Incision depth:  Subcutaneous    Scalpel blade:  11    Wound management:  Probed and deloculated and extensive cleaning    Drainage:  Bloody, purulent and serosanguinous    Drainage amount:  Scant    Wound treatment:  Wound left open  Post-procedure details:     Patient tolerance of procedure:   Tolerated well, no immediate complications        Progress Note:   Pt has been reexamined by Luz Espinal MD. Pt is feeling much better. Symptoms have improved. All available results have been reviewed with pt and any available family. Pt understands sx, dx, and tx in ED. Care plan has been outlined and questions have been answered. Pt is ready to go home. Will send home on abscess instruction and wound care education. The patient was instructed to continue Keflex as previously instructed outpatient referral with PCP 2 to 3 days for wound recheck and further treatment as needed. Written by Lucy Iyer MD,8:55 AM    .   .

## 2020-02-10 NOTE — DISCHARGE INSTRUCTIONS

## 2020-02-10 NOTE — ED TRIAGE NOTES
Patient arrives to ED with complaints of abscess to scalp that has been present for about 9 days and has not been improving     Patient taking keflex

## 2020-02-10 NOTE — ED NOTES
I have reviewed discharge instructions with the patient. The patient verbalized understanding. Pt confirmed understanding of need for follow up with primary care provider. Pt is not in any current distress and shows no evidence of clinical instability. Pt left ambulatory  Pt family/friends are present. Pt left with all personal belongings. Pt states they are not driving. Pt states chief complaint has  improved with treatment provided    PT is alert and oriented x 4, Respiratory status is WDL, Cardiac system is WDL    Paperwork given and reviewed with patient, opportunity for questions/clarification given.     Patient Vitals for the past 4 hrs:   Temp Pulse Resp BP SpO2   02/10/20 0030  88 16 126/78 97 %   02/09/20 2120 98.5 °F (36.9 °C) 95 15 124/54 98 %

## 2020-02-12 LAB
BACTERIA SPEC CULT: ABNORMAL
GRAM STN SPEC: ABNORMAL
GRAM STN SPEC: ABNORMAL
SERVICE CMNT-IMP: ABNORMAL

## 2020-02-14 ENCOUNTER — TELEPHONE (OUTPATIENT)
Dept: NEUROLOGY | Age: 70
End: 2020-02-14

## 2020-02-15 ENCOUNTER — HOSPITAL ENCOUNTER (EMERGENCY)
Age: 70
Discharge: HOME | End: 2020-02-15
Attending: EMERGENCY MEDICINE
Payer: MEDICARE

## 2020-02-15 VITALS
RESPIRATION RATE: 20 BRPM | OXYGEN SATURATION: 95 % | TEMPERATURE: 98.1 F | HEART RATE: 76 BPM | SYSTOLIC BLOOD PRESSURE: 126 MMHG | DIASTOLIC BLOOD PRESSURE: 60 MMHG

## 2020-02-15 DIAGNOSIS — L02.811 ABSCESS OF SCALP: Primary | ICD-10-CM

## 2020-02-15 PROCEDURE — 99281 EMR DPT VST MAYX REQ PHY/QHP: CPT

## 2020-02-15 PROCEDURE — 75810000289 HC I&D ABSCESS SIMP/COMP/MULT

## 2020-02-15 RX ORDER — LIDOCAINE HYDROCHLORIDE AND EPINEPHRINE 10; 10 MG/ML; UG/ML
10 INJECTION, SOLUTION INFILTRATION; PERINEURAL ONCE
Status: DISCONTINUED | OUTPATIENT
Start: 2020-02-15 | End: 2020-02-15 | Stop reason: HOSPADM

## 2020-02-15 NOTE — ED TRIAGE NOTES
Pt seen here for abscess/cyst on top of head on  Thursday. Reports it has increased in size and is more painful since last visit.

## 2020-02-15 NOTE — ED PROVIDER NOTES
The history is provided by the patient and a relative. Skin Problem    This is a recurrent problem. The current episode started 2 days ago (recurrent). The problem has not changed since onset. Associated with: recent I&d of small abscess. There has been no fever. The rash is present on the scalp. The pain is mild. The pain has been constant since onset. Associated symptoms include itching. Pertinent negatives include no pain. Treatments tried: I&D. The treatment provided significant (but wound closed off and pus reaccumulated) relief. Past Medical History:   Diagnosis Date    Anxiety     Auditory hallucinations     Depression     Diabetes ([facility]     Esophageal reflux     Hypertension     [facility]roke ([facility] 01/2018       No past surgical history on file. No family history on file.     Social History     Socioeconomic History    Marital status:      Spouse name: Not on file    Number of children: Not on file    Years of education: Not on file    Highest education level: Not on file   Occupational History    Not on file   Social Needs    Financial resource strain: Not on file    Food insecurity:     Worry: Not on file     Inability: Not on file    Transportation needs:     Medical: Not on file     Non-medical: Not on file   Tobacco Use    Smoking status: Never Smoker    Smokeless tobacco: Never Used   Substance and Sexual Activity    Alcohol use: Not Currently    Drug use: Not Currently    Sexual activity: Not Currently   Lifestyle    Physical activity:     Days per week: Not on file     Minutes per session: Not on file    Stress: Not on file   Relationships    Social connections:     Talks on phone: Not on file     Gets together: Not on file     Attends Oriental orthodox service: Not on file     Active member of club or organization: Not on file     Attends meetings of clubs or organizations: Not on file     Relationship status: Not on file    Intimate partner violence:     Fear of current or ex partner: Not on file     Emotionally abused: Not on file     Physically abused: Not on file     Forced sexual activity: Not on file   Other Topics Concern    Not on file   Social History Narrative    Not on file         ALLERGIES: Patient has no known allergies. Review of Systems   Skin: Positive for itching, rash and wound. All other systems reviewed and are negative. Vitals:    02/15/20 1557   BP: 126/60   Pulse: 76   Resp: 20   Temp: 98.1 °F (36.7 °C)   SpO2: 95%            Physical Exam  Vitals signs and nursing note reviewed. Constitutional:       General: She is not in acute distress. Appearance: She is well-developed. HENT:      Head: Normocephalic and atraumatic. Comments: 1 cm fluctuant area over left anterior scalp without erythema, induration, or warmth  Eyes:      Conjunctiva/sclera: Conjunctivae normal.   Neck:      Musculoskeletal: Neck supple. Cardiovascular:      Rate and Rhythm: Normal rate and regular rhythm. Pulmonary:      Effort: Pulmonary effort is normal. No respiratory distress. Abdominal:      General: There is no distension. Musculoskeletal: Normal range of motion. General: No deformity. Skin:     General: Skin is warm and dry. Neurological:      Mental Status: She is alert. Cranial Nerves: No cranial nerve deficit. Psychiatric:         Behavior: Behavior normal.          MDM     78-year-old female presents with recurrent abscess of the left anterior scalp. She had incision and drainage performed there previously which relieved her symptoms but when she went home she took her bandage off, allowed the area to dry and scab off which resulted in reaccumulation of fluid and discomfort in the area. She had a wound culture performed which shows that she is on an appropriate antibiotic and she does not have evidence of extension to suggest cellulitis.   Recurrent I&D was performed and a small amount of packing was placed to maintain patency of the wound. We discussed putting a small amount of bacitracin or Vaseline on the wound to keep it moist and prevent it from closing so that pus would not reaccumulate. Plan to follow up with PCP as needed and return precautions discussed for worsening or new concerning symptoms. Procedures    INCISION AND DRAINAGE  Performed by: Lupe Garcia  Consent: Verbal consent obtained. Risks and benefits: risks, benefits and alternatives were discussed  Type: abscess    Body area: left anterior scalp    Anesthesia: local infiltration    Incision was made with a scalpel. Local anesthetic: lidocaine 2% w epinephrine    Anesthetic total: 1 ml    Complexity: complex  Blunt dissection to break up loculations    Drainage: purulent    Drainage amount: moderate    Packing material: 1/2\" plain moist gauze strip 2 cm    Patient tolerance: Patient tolerated the procedure well with no immediate complications.

## 2020-02-21 NOTE — PROGRESS NOTES
[address] [facility] [address]  Phone: [phone] Fax: [phone]      Discharge Summary 2-15    Patient name: Jacqueline Weaver  : 1950  Provider#: [id number]  Referral source: Kirt Taylor NP      Medical/Treatment Diagnosis: Gait instability [R26.81]     Prior Hospitalization: see medical history     Comorbidities: See Plan of Care  Prior Level of Function: See Plan of Care  Medications: Verified on Patient Summary List    Start of Care: 19      Onset Date:19   Visits from Start of Care: 2     Missed Visits: 0  Reporting Period : 19 to 19    Assessment/Summary of care: Ms. Veena Carrera was evaluated for her gait instability and treated with therapeutic exercises and neuromuscular re-education exercises. She tolerated all interventions well, but after discussion with her daughter, she opted to transition to a PT clinic closer to home. Short Term Goals: To be accomplished in 12 treatments:  1. Patient will be able to demonstrate SLS >15 sec B to allow for improved stability when ambulating on uneven surfaces. Not met. 2. Patient will be able to walk two blocks with no rest breaks and no LOB. Not met. 3. Patient will be able to stand for 10 minutes with no rest breaks and no LOB. Not met. Long Term Goals: To be accomplished in 24 treatments:  1. Patient will be able to demonstrate SLS >30 sec B to allow for decreased fall risk. Not met. 2. Patient will be able to walk 1/4 mile with no rest breaks or LOB. Not met. 3. Patient will be able to stand for 20 minutes with no rest breaks or LOB. Not met.         RECOMMENDATIONS:  [x]Discontinue therapy: [x]Patient has reached or is progressing toward set goals     []Patient is non-compliant or has abdicated     []Due to lack of appreciable progress towards set goals     []Mayra Ferguson, PT 2020

## 2020-02-25 RX ORDER — LISINOPRIL 30 MG/1
30 TABLET ORAL DAILY
Qty: 90 TAB | Refills: 1 | Status: SHIPPED | OUTPATIENT
Start: 2020-02-25 | End: 2020-08-24

## 2020-02-25 RX ORDER — CLOPIDOGREL BISULFATE 75 MG/1
TABLET ORAL
Qty: 30 TAB | Refills: 2 | Status: SHIPPED | OUTPATIENT
Start: 2020-02-25 | End: 2020-06-01

## 2020-02-25 RX ORDER — PANTOPRAZOLE SODIUM 40 MG/1
40 TABLET, DELAYED RELEASE ORAL DAILY
Qty: 90 TAB | Refills: 1 | Status: SHIPPED | OUTPATIENT
Start: 2020-02-25 | End: 2020-08-21

## 2020-02-27 ENCOUNTER — OFFICE VISIT (OUTPATIENT)
Dept: NEUROLOGY | Age: 70
End: 2020-02-27

## 2020-02-27 DIAGNOSIS — F32.A ANXIETY AND DEPRESSION: ICD-10-CM

## 2020-02-27 DIAGNOSIS — F03.90 MAJOR NEUROCOGNITIVE DISORDER DUE TO ALZHEIMER'S DISEASE, PROBABLE, WITHOUT BEHAVIORAL DISTURBANCE: Primary | ICD-10-CM

## 2020-02-27 DIAGNOSIS — F41.9 ANXIETY AND DEPRESSION: ICD-10-CM
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
## 2020-02-28 ENCOUNTER — TELEPHONE (OUTPATIENT)
Dept: NEUROLOGY | Age: 70
End: 2020-02-28

## 2020-02-28 NOTE — TELEPHONE ENCOUNTER
----- Message from Mary Cagle sent at 2/28/2020  8:29 AM EST -----  Regarding: Dr. Markus Samayoa would like a call back regarding her mother's test results.  Contact is [phone]

## 2020-03-26 ENCOUNTER — HOSPITAL ENCOUNTER (INPATIENT)
Age: 70
LOS: 1 days | Discharge: HOME HEALTH | DRG: 637 | End: 2020-03-27
Attending: EMERGENCY MEDICINE | Admitting: INTERNAL MEDICINE
Payer: MEDICARE

## 2020-03-26 DIAGNOSIS — G93.40 ENCEPHALOPATHY: ICD-10-CM

## 2020-03-26 DIAGNOSIS — N17.9 AKI (ACUTE KIDNEY INJURY) (HCC): ICD-10-CM

## 2020-03-26 DIAGNOSIS — R73.9 HYPERGLYCEMIA WITHOUT KETOSIS: Primary | ICD-10-CM

## 2020-03-26 PROBLEM — F32.A DEPRESSION: Status: ACTIVE | Noted: 2020-03-26

## 2020-03-26 PROBLEM — I10 HTN (HYPERTENSION): Status: ACTIVE | Noted: 2020-03-26

## 2020-03-26 PROBLEM — E11.10 DKA (DIABETIC KETOACIDOSES): Status: ACTIVE | Noted: 2020-03-26

## 2020-03-26 PROBLEM — E87.1 HYPONATREMIA: Status: ACTIVE | Noted: 2020-03-26

## 2020-03-26 PROBLEM — D50.9 MICROCYTIC ANEMIA: Status: ACTIVE | Noted: 2020-03-26

## 2020-03-26 PROBLEM — F41.9 ANXIETY: Status: ACTIVE | Noted: 2020-03-26

## 2020-03-26 PROBLEM — K21.9 GERD (GASTROESOPHAGEAL REFLUX DISEASE): Status: ACTIVE | Noted: 2020-03-26

## 2020-03-26 LAB
ADMINISTERED INITIALS, ADMINIT: NORMAL
ALBUMIN SERPL-MCNC: 3.6 G/DL (ref 3.5–5)
ALBUMIN/GLOB SERPL: 0.7 {RATIO} (ref 1.1–2.2)
ALP SERPL-CCNC: 241 U/L (ref 45–117)
ALT SERPL-CCNC: 40 U/L (ref 12–78)
ANION GAP SERPL CALC-SCNC: 10 MMOL/L (ref 5–15)
ANION GAP SERPL CALC-SCNC: 14 MMOL/L (ref 5–15)
APPEARANCE UR: CLEAR
AST SERPL-CCNC: 57 U/L (ref 15–37)
BACTERIA URNS QL MICRO: NEGATIVE /HPF
BASE DEFICIT BLDV-SCNC: 6.5 MMOL/L
BASOPHILS # BLD: 0.1 K/UL (ref 0–0.1)
BASOPHILS NFR BLD: 1 % (ref 0–1)
BDY SITE: ABNORMAL
BILIRUB SERPL-MCNC: 1.2 MG/DL (ref 0.2–1)
BILIRUB UR QL: NEGATIVE
BUN SERPL-MCNC: 28 MG/DL (ref 6–20)
BUN SERPL-MCNC: 36 MG/DL (ref 6–20)
BUN/CREAT SERPL: 21 (ref 12–20)
BUN/CREAT SERPL: 24 (ref 12–20)
CALCIUM SERPL-MCNC: 9.4 MG/DL (ref 8.5–10.1)
CALCIUM SERPL-MCNC: 9.7 MG/DL (ref 8.5–10.1)
CHLORIDE SERPL-SCNC: 105 MMOL/L (ref 97–108)
CHLORIDE SERPL-SCNC: 96 MMOL/L (ref 97–108)
CO2 SERPL-SCNC: 17 MMOL/L (ref 21–32)
CO2 SERPL-SCNC: 20 MMOL/L (ref 21–32)
COLOR UR: ABNORMAL
COMMENT, HOLDF: NORMAL
CREAT SERPL-MCNC: 1.19 MG/DL (ref 0.55–1.02)
CREAT SERPL-MCNC: 1.75 MG/DL (ref 0.55–1.02)
D50 ADMINISTERED, D50ADM: 0 ML
D50 ORDER, D50ORD: 0 ML
DIFFERENTIAL METHOD BLD: ABNORMAL
EOSINOPHIL # BLD: 0 K/UL (ref 0–0.4)
EOSINOPHIL NFR BLD: 0 % (ref 0–7)
EPITH CASTS URNS QL MICRO: ABNORMAL /LPF
ERYTHROCYTE [DISTWIDTH] IN BLOOD BY AUTOMATED COUNT: 18.1 % (ref 11.5–14.5)
EST. AVERAGE GLUCOSE BLD GHB EST-MCNC: 263 MG/DL
FOLATE SERPL-MCNC: 15.9 NG/ML (ref 5–21)
GLOBULIN SER CALC-MCNC: 5.5 G/DL (ref 2–4)
GLSCOM COMMENTS: NORMAL
GLUCOSE BLD STRIP.AUTO-MCNC: 121 MG/DL (ref 65–100)
GLUCOSE BLD STRIP.AUTO-MCNC: 138 MG/DL (ref 65–100)
GLUCOSE BLD STRIP.AUTO-MCNC: 139 MG/DL (ref 65–100)
GLUCOSE BLD STRIP.AUTO-MCNC: 176 MG/DL (ref 65–100)
GLUCOSE BLD STRIP.AUTO-MCNC: 184 MG/DL (ref 65–100)
GLUCOSE BLD STRIP.AUTO-MCNC: 250 MG/DL (ref 65–100)
GLUCOSE BLD STRIP.AUTO-MCNC: 258 MG/DL (ref 65–100)
GLUCOSE BLD STRIP.AUTO-MCNC: 312 MG/DL (ref 65–100)
GLUCOSE BLD STRIP.AUTO-MCNC: 412 MG/DL (ref 65–100)
GLUCOSE BLD STRIP.AUTO-MCNC: 487 MG/DL (ref 65–100)
GLUCOSE BLD STRIP.AUTO-MCNC: 526 MG/DL (ref 65–100)
GLUCOSE SERPL-MCNC: 212 MG/DL (ref 65–100)
GLUCOSE SERPL-MCNC: 533 MG/DL (ref 65–100)
GLUCOSE UR STRIP.AUTO-MCNC: >1000 MG/DL
GLUCOSE, GLC: 121 MG/DL
GLUCOSE, GLC: 138 MG/DL
GLUCOSE, GLC: 139 MG/DL
GLUCOSE, GLC: 176 MG/DL
GLUCOSE, GLC: 184 MG/DL
GLUCOSE, GLC: 250 MG/DL
GLUCOSE, GLC: 258 MG/DL
GLUCOSE, GLC: 312 MG/DL
GLUCOSE, GLC: 487 MG/DL
HBA1C MFR BLD: 10.8 % (ref 4–5.6)
HCO3 BLDV-SCNC: 16 MMOL/L (ref 23–28)
HCT VFR BLD AUTO: 27.6 % (ref 35–47)
HGB BLD-MCNC: 8.6 G/DL (ref 11.5–16)
HGB UR QL STRIP: NEGATIVE
HIGH TARGET, HITG: 250 MG/DL
HYALINE CASTS URNS QL MICRO: ABNORMAL /LPF (ref 0–5)
IMM GRANULOCYTES # BLD AUTO: 0 K/UL (ref 0–0.04)
IMM GRANULOCYTES NFR BLD AUTO: 0 % (ref 0–0.5)
INSULIN ADMINSTERED, INSADM: 0 UNITS/HOUR
INSULIN ADMINSTERED, INSADM: 0.1 UNITS/HOUR
INSULIN ADMINSTERED, INSADM: 0.1 UNITS/HOUR
INSULIN ADMINSTERED, INSADM: 0.8 UNITS/HOUR
INSULIN ADMINSTERED, INSADM: 1.2 UNITS/HOUR
INSULIN ADMINSTERED, INSADM: 3.5 UNITS/HOUR
INSULIN ADMINSTERED, INSADM: 5 UNITS/HOUR
INSULIN ADMINSTERED, INSADM: 5.9 UNITS/HOUR
INSULIN ADMINSTERED, INSADM: 8.5 UNITS/HOUR
INSULIN ORDER, INSORD: 0 UNITS/HOUR
INSULIN ORDER, INSORD: 0.1 UNITS/HOUR
INSULIN ORDER, INSORD: 0.1 UNITS/HOUR
INSULIN ORDER, INSORD: 0.8 UNITS/HOUR
INSULIN ORDER, INSORD: 1.2 UNITS/HOUR
INSULIN ORDER, INSORD: 3.5 UNITS/HOUR
INSULIN ORDER, INSORD: 5 UNITS/HOUR
INSULIN ORDER, INSORD: 5.9 UNITS/HOUR
INSULIN ORDER, INSORD: 8.5 UNITS/HOUR
IRON SERPL-MCNC: 42 UG/DL (ref 35–150)
KETONES UR QL STRIP.AUTO: 15 MG/DL
LEUKOCYTE ESTERASE UR QL STRIP.AUTO: NEGATIVE
LOW TARGET, LOT: 150 MG/DL
LYMPHOCYTES # BLD: 1.6 K/UL (ref 0.8–3.5)
LYMPHOCYTES NFR BLD: 22 % (ref 12–49)
MAGNESIUM SERPL-MCNC: 2 MG/DL (ref 1.6–2.4)
MCH RBC QN AUTO: 24.6 PG (ref 26–34)
MCHC RBC AUTO-ENTMCNC: 31.2 G/DL (ref 30–36.5)
MCV RBC AUTO: 79.1 FL (ref 80–99)
MINUTES UNTIL NEXT BG, NBG: 60 MIN
MONOCYTES # BLD: 0.4 K/UL (ref 0–1)
MONOCYTES NFR BLD: 6 % (ref 5–13)
MULTIPLIER, MUL: 0
MULTIPLIER, MUL: 0.01
MULTIPLIER, MUL: 0.02
MULTIPLIER, MUL: 0.03
MULTIPLIER, MUL: 0.03
NEUTS SEG # BLD: 5 K/UL (ref 1.8–8)
NEUTS SEG NFR BLD: 71 % (ref 32–75)
NITRITE UR QL STRIP.AUTO: NEGATIVE
NRBC # BLD: 0 K/UL (ref 0–0.01)
NRBC BLD-RTO: 0 PER 100 WBC
ORDER INITIALS, ORDINIT: NORMAL
PCO2 BLDV: 25 MMHG (ref 41–51)
PH BLDV: 7.43 [PH] (ref 7.32–7.42)
PH UR STRIP: 5.5 [PH] (ref 5–8)
PHOSPHATE SERPL-MCNC: 2.8 MG/DL (ref 2.6–4.7)
PLATELET # BLD AUTO: 224 K/UL (ref 150–400)
PMV BLD AUTO: 11.2 FL (ref 8.9–12.9)
PO2 BLDV: 55 MMHG (ref 25–40)
POTASSIUM SERPL-SCNC: 4.3 MMOL/L (ref 3.5–5.1)
POTASSIUM SERPL-SCNC: 5.2 MMOL/L (ref 3.5–5.1)
PROT SERPL-MCNC: 9.1 G/DL (ref 6.4–8.2)
PROT UR STRIP-MCNC: NEGATIVE MG/DL
RBC # BLD AUTO: 3.49 M/UL (ref 3.8–5.2)
RBC #/AREA URNS HPF: ABNORMAL /HPF (ref 0–5)
SAMPLES BEING HELD,HOLD: NORMAL
SAO2 % BLDV: 90 % (ref 65–88)
SAO2% DEVICE SAO2% SENSOR NAME: ABNORMAL
SERVICE CMNT-IMP: ABNORMAL
SODIUM SERPL-SCNC: 127 MMOL/L (ref 136–145)
SODIUM SERPL-SCNC: 135 MMOL/L (ref 136–145)
SP GR UR REFRACTOMETRY: 1.02 (ref 1–1.03)
SPECIMEN SITE: ABNORMAL
TSH SERPL DL<=0.05 MIU/L-ACNC: 1.88 UIU/ML (ref 0.36–3.74)
UR CULT HOLD, URHOLD: NORMAL
UROBILINOGEN UR QL STRIP.AUTO: 0.2 EU/DL (ref 0.2–1)
VIT B12 SERPL-MCNC: 1328 PG/ML (ref 193–986)
WBC # BLD AUTO: 7.1 K/UL (ref 3.6–11)
WBC URNS QL MICRO: ABNORMAL /HPF (ref 0–4)

## 2020-03-26 PROCEDURE — 74011250636 HC RX REV CODE- 250/636: Performed by: INTERNAL MEDICINE

## 2020-03-26 PROCEDURE — 82962 GLUCOSE BLOOD TEST: CPT

## 2020-03-26 PROCEDURE — 81001 URINALYSIS AUTO W/SCOPE: CPT

## 2020-03-26 PROCEDURE — 74011636637 HC RX REV CODE- 636/637: Performed by: EMERGENCY MEDICINE

## 2020-03-26 PROCEDURE — 82607 VITAMIN B-12: CPT

## 2020-03-26 PROCEDURE — 82746 ASSAY OF FOLIC ACID SERUM: CPT

## 2020-03-26 PROCEDURE — 85025 COMPLETE CBC W/AUTO DIFF WBC: CPT

## 2020-03-26 PROCEDURE — 77030038269 HC DRN EXT URIN PURWCK BARD -A

## 2020-03-26 PROCEDURE — 84443 ASSAY THYROID STIM HORMONE: CPT

## 2020-03-26 PROCEDURE — 65660000000 HC RM CCU STEPDOWN

## 2020-03-26 PROCEDURE — 83735 ASSAY OF MAGNESIUM: CPT

## 2020-03-26 PROCEDURE — 74011250636 HC RX REV CODE- 250/636: Performed by: EMERGENCY MEDICINE

## 2020-03-26 PROCEDURE — 74011000258 HC RX REV CODE- 258: Performed by: EMERGENCY MEDICINE

## 2020-03-26 PROCEDURE — 84100 ASSAY OF PHOSPHORUS: CPT

## 2020-03-26 PROCEDURE — 80053 COMPREHEN METABOLIC PANEL: CPT

## 2020-03-26 PROCEDURE — 82803 BLOOD GASES ANY COMBINATION: CPT

## 2020-03-26 PROCEDURE — 83036 HEMOGLOBIN GLYCOSYLATED A1C: CPT

## 2020-03-26 PROCEDURE — 99285 EMERGENCY DEPT VISIT HI MDM: CPT

## 2020-03-26 PROCEDURE — 83540 ASSAY OF IRON: CPT

## 2020-03-26 PROCEDURE — 36415 COLL VENOUS BLD VENIPUNCTURE: CPT

## 2020-03-26 PROCEDURE — 74011000258 HC RX REV CODE- 258: Performed by: INTERNAL MEDICINE

## 2020-03-26 RX ORDER — DEXTROSE MONOHYDRATE AND SODIUM CHLORIDE 5; .45 G/100ML; G/100ML
125 INJECTION, SOLUTION INTRAVENOUS CONTINUOUS
Status: DISCONTINUED | OUTPATIENT
Start: 2020-03-26 | End: 2020-03-27

## 2020-03-26 RX ORDER — ACETAMINOPHEN 500 MG
1500 TABLET ORAL
COMMUNITY

## 2020-03-26 RX ORDER — DEXTROSE MONOHYDRATE 100 MG/ML
0-250 INJECTION, SOLUTION INTRAVENOUS AS NEEDED
Status: DISCONTINUED | OUTPATIENT
Start: 2020-03-26 | End: 2020-03-27

## 2020-03-26 RX ORDER — MAGNESIUM SULFATE 100 %
4 CRYSTALS MISCELLANEOUS AS NEEDED
Status: DISCONTINUED | OUTPATIENT
Start: 2020-03-26 | End: 2020-03-27

## 2020-03-26 RX ORDER — HEPARIN SODIUM 5000 [USP'U]/ML
5000 INJECTION, SOLUTION INTRAVENOUS; SUBCUTANEOUS EVERY 8 HOURS
Status: DISCONTINUED | OUTPATIENT
Start: 2020-03-26 | End: 2020-03-27 | Stop reason: HOSPADM

## 2020-03-26 RX ORDER — SODIUM CHLORIDE 0.9 % (FLUSH) 0.9 %
5-40 SYRINGE (ML) INJECTION AS NEEDED
Status: DISCONTINUED | OUTPATIENT
Start: 2020-03-26 | End: 2020-03-27 | Stop reason: HOSPADM

## 2020-03-26 RX ORDER — INSULIN LISPRO 100 [IU]/ML
INJECTION, SOLUTION INTRAVENOUS; SUBCUTANEOUS
Status: DISCONTINUED | OUTPATIENT
Start: 2020-03-26 | End: 2020-03-27

## 2020-03-26 RX ORDER — SODIUM CHLORIDE 0.9 % (FLUSH) 0.9 %
5-40 SYRINGE (ML) INJECTION EVERY 8 HOURS
Status: DISCONTINUED | OUTPATIENT
Start: 2020-03-26 | End: 2020-03-27 | Stop reason: HOSPADM

## 2020-03-26 RX ORDER — SODIUM CHLORIDE 9 MG/ML
150 INJECTION, SOLUTION INTRAVENOUS CONTINUOUS
Status: DISCONTINUED | OUTPATIENT
Start: 2020-03-26 | End: 2020-03-26

## 2020-03-26 RX ADMIN — SODIUM CHLORIDE 1000 ML: 900 INJECTION, SOLUTION INTRAVENOUS at 13:57

## 2020-03-26 RX ADMIN — SODIUM CHLORIDE 1000 ML: 900 INJECTION, SOLUTION INTRAVENOUS at 14:01

## 2020-03-26 RX ADMIN — SODIUM CHLORIDE 8.5 UNITS/HR: 900 INJECTION, SOLUTION INTRAVENOUS at 14:02

## 2020-03-26 RX ADMIN — SODIUM CHLORIDE 150 ML/HR: 900 INJECTION, SOLUTION INTRAVENOUS at 14:35

## 2020-03-26 RX ADMIN — HEPARIN SODIUM 5000 UNITS: 5000 INJECTION INTRAVENOUS; SUBCUTANEOUS at 22:33

## 2020-03-26 RX ADMIN — DEXTROSE MONOHYDRATE AND SODIUM CHLORIDE 125 ML/HR: 5; .45 INJECTION, SOLUTION INTRAVENOUS at 17:44

## 2020-03-26 RX ADMIN — Medication 5 ML: at 15:00

## 2020-03-26 RX ADMIN — HEPARIN SODIUM 5000 UNITS: 5000 INJECTION INTRAVENOUS; SUBCUTANEOUS at 17:39

## 2020-03-26 NOTE — PROGRESS NOTES
1:25 PM  Met briefly with patient who is poor historian, agreed for worker to call her daughter, Fidel Yanez [phone]. Patient lives with daughter and has been since October of 2019. Prior to that lived with her daughter in [facility], [place]. Has AMD with [place] as primary person on Medical POA and Jaquline as secondary, per jaquline. The home is a two story home, but patient had started staying downstairs in December after some falls. She had two strokes previously and moved in with [place], then came to visit [address] and stayed. Patient had been going outpatient for PT until January when she stopped going due to weakness and daughter could not convince her to go. Daughter started managing her medications when she realized she was taking the sleep medications other times of the day which was resulting in the falls. Daughter said that she manages the medications but allows patient to feel like she is still managing them. [address] [facility] on [address] [place] [address]     Patient did not have any inpatient rehabilitation stays at either IRF or SNF level after strokes, but was going outpatient. Daughter identified that she would like to see patient get Home Health therapy if physician agrees and will discuss with CM when she gets to the floor. Preference was not discussed. She has talked with the ED physician and is understanding of admission until blood sugar is regulated is her understanding. Care Management Interventions  PCP Verified by CM: Yes(Dr Emelia Warren)  Last Visit to PCP: 02/27/20  Current Support Network:  Other(Has lived with daughter, Fidel Yanez, since October 2019)  Confirm Follow Up Transport: Family  Discharge Location  Discharge Placement: Home with home health

## 2020-03-26 NOTE — ED TRIAGE NOTES
Pt arrives w/ daughter w/ c/c of hyperglycemia & AMS x2days. Daughter reports the last time she took her prescribed medications was yesterday.

## 2020-03-26 NOTE — PROGRESS NOTES
[facility]: MED RECONCILIATION    Comments/Recommendations:   Spoke with patient's daughter Phoebe Carvajal, via telephone. Patient's daughter confirmed patient's name and date of birth. Oxycodone was initially listed as allergy. Upon questioning patient's daughter regarding reaction, she said that reaction of severe itching occurred after patient received an IV pain medication after surgery. When pharmacist mentioned Morphine, patient's daughter said that it was Morphine IV that caused itching, not Oxycodone. Oxycodone allergy removed and Morphine allergy entered. Patient did not take any medications today. Patient recently did use some Novolog insulin that she had from an old prescription because her blood sugar was very high despite her usual Levemir. Novolog is not a current prescription. Preferred pharmacy is [facility] [address]. Allergies: Morphine    Prior to Admission Medications   Prescriptions Last Dose Informant Patient Reported? Taking? QUEtiapine (SEROQUEL) 50 mg tablet 3/25/2020 at Unknown time Child No Yes   Sig: Take 1 Tab by mouth two (2) times a day. acetaminophen (TYLENOL) 500 mg tablet 3/25/2020 at Unknown time Child Yes Yes   Sig: Take 1,500 mg by mouth two (2) times daily as needed for Pain. amLODIPine (NORVASC) 10 mg tablet 3/25/2020 at Unknown time Child No Yes   Sig: amlodipine 10 mg tablet   clopidogreL (PLAVIX) 75 mg tab 3/25/2020 at Unknown time Child No Yes   Sig: TAKE 1 TABLET BY MOUTH DAILY   insulin detemir U-100 (LEVEMIR FLEXTOUCH) 100 unit/mL (3 mL) inpn 3/25/2020 at Unknown time Child Yes Yes   Si Units by SubCUTAneous route nightly. lisinopril (PRINIVIL, ZESTRIL) 30 mg tablet 3/25/2020 at Unknown time Child No Yes   Sig: Take 1 Tab by mouth daily. pantoprazole (PROTONIX) 40 mg tablet 3/25/2020 at Unknown time Child No Yes   Sig: Take 1 Tab by mouth daily.    sertraline (ZOLOFT) 50 mg tablet 3/25/2020 at Unknown time Child No Yes   Sig: Take 1 Tab by mouth daily.  Take 0.5 tablet po in the morning and one tablet po at bedtime        Naina Gan, Pharmacist

## 2020-03-26 NOTE — ED NOTES
Ultrasound IV by ED Staff Procedure Note    Patient meets criteria for US IV insertion. Ultrasound IV verbal education provided to patient. Opportunities for questions given. IV ultrasound technique used for PIV placement:  20gauge  rac location. 1 X Attempt(s). Procedure tolerated well. Primary RN aware of IV placement and added to LDA.                                 Lucita Summers RN

## 2020-03-26 NOTE — H&P
[facility]  [address] [facility] [address]  [phone]    Admission History and Physical      NAME:  Bekah Montalvo   :   1950   MRN:  [id number]     PCP:  Archana Lantigua MD     Date of service:  3/26/2020         Subjective:     CHIEF COMPLAINT: High blood sugar, confusion, poor p.o. intake. HISTORY OF PRESENT ILLNESS:     Ms. Abram Acevedo is a 65 y.o.  female who is admitted with DKA. Ms. Abram Acevedo his past medical history of diabetes mellitus, anxiety, depression, hypertension. GERD presented to ER complaining of confusion, high blood sugar and poor p.o. intake which started 2 days ago. Patient stated that she has not been eating enough for the last 5 days, but if she eats, she eats sugary food. Has been very weak and recently started to be confused intermittently. Patient denies abdominal pain, diarrhea or vomiting. Has been having polyuria. Past Medical History:   Diagnosis Date    Anxiety     Auditory hallucinations     Depression     Diabetes ([facility]     Esophageal reflux     Hypertension     Stroke ([facility] 2018        History reviewed. No pertinent surgical history. Social History     Tobacco Use    Smoking status: Never Smoker    Smokeless tobacco: Never Used   Substance Use Topics    Alcohol use: Not Currently        History reviewed. No pertinent family history. Allergies   Allergen Reactions    Oxycodone Rash        Prior to Admission medications    Medication Sig Start Date End Date Taking? Authorizing Provider   clopidogreL (PLAVIX) 75 mg tab TAKE 1 TABLET BY MOUTH DAILY 20   Archana Lantigua MD   lisinopril (PRINIVIL, ZESTRIL) 30 mg tablet Take 1 Tab by mouth daily. 20   Archana Lantigua MD   pantoprazole (PROTONIX) 40 mg tablet Take 1 Tab by mouth daily.  20   Archana Lantigua MD   amLODIPine (NORVASC) 10 mg tablet amlodipine 10 mg tablet 20   Archana Lantigua MD   sertraline (ZOLOFT) 50 mg tablet Take 1 Tab by mouth daily. Take 0.5 tablet po in the morning and one tablet po at bedtime 1/3/20   Luis Daniel Vines MD   QUEtiapine (SEROQUEL) 50 mg tablet Take 1 Tab by mouth two (2) times a day. 1/2/20   Angela Matthews MD   traZODone (DESYREL) 50 mg tablet Take  by mouth nightly. Provider, Historical   metFORMIN ER (GLUCOPHAGE XR) 500 mg tablet Take 2 tabs po with breakfast daily 11/18/19   Luis Daniel Vines MD   propranolol (INDERAL) 40 mg tablet Take 40 mg by mouth two (2) times a day. Indications: tremor    Provider, Historical   rosuvastatin (CRESTOR) 20 mg tablet Take 20 mg by mouth nightly.     Provider, Historical         Review of Systems:  (bold if positive, if negative)    Gen:  Eyes:  ENT:  CVS:  Pulm:  GI:  :  MS:  Skin:  Psych:  Endo:  Hem:  Renal:  Neuro:            Objective:      VITALS:    Vital signs reviewed; most recent are:    Visit Vitals  /43 (BP 1 Location: Right arm, BP Patient Position: At rest)   Pulse (!) 108   Temp 98.3 °F (36.8 °C)   Resp 16   Ht 5' 2\" (1.575 m)   Wt 70.3 kg (155 lb)   SpO2 100%   BMI 28.35 kg/m²     SpO2 Readings from Last 6 Encounters:   03/26/20 100%   02/15/20 95%   02/10/20 97%   12/20/19 94%   12/07/19 100%   12/04/19 95%        No intake or output data in the 24 hours ending 03/26/20 1255         Exam:     Physical Exam:    Gen:  Well-developed, well-nourished, in no acute distress  HEENT:  Pink conjunctivae, PERRL, hearing intact to voice, moist mucous membranes  Neck:  Supple, without masses, thyroid non-tender  Resp:  No accessory muscle use, clear breath sounds without wheezes rales or rhonchi  Card:  No murmurs, normal S1, S2 without thrills, bruits or peripheral edema  Abd:  Soft, non-tender, non-distended, normoactive bowel sounds are present, no palpable organomegaly and no detectable hernias  Lymph:  No cervical or inguinal adenopathy  Musc:  No cyanosis or clubbing  Skin:  No rashes or ulcers, skin turgor is good  Neuro:  Cranial nerves are grossly intact, no focal motor weakness, follows commands appropriately  Psych:  Good insight, oriented to person, place and time, alert       Labs:    Recent Labs     03/26/20  1114   WBC 7.1   HGB 8.6*   HCT 27.6*        Recent Labs     03/26/20  1114   *   K 5.2*   CL 96*   CO2 17*   *   BUN 36*   CREA 1.75*   CA 9.7   MG 2.0   ALB 3.6   TBILI 1.2*   SGOT 57*   ALT 40     Lab Results   Component Value Date/Time    Glucose (POC) 526 (H) 03/26/2020 10:40 AM     No results for input(s): PH, PCO2, PO2, HCO3, FIO2 in the last 72 hours. No results for input(s): INR, INREXT in the last 72 hours. Telemetry reviewed:          Assessment/Plan:    1. DKA (diabetic ketoacidoses) ([facility] (3/26/2020). This is most likely due to noncompliance with her diet. Admit to stepdown. Resume insulin drip which was already started in ER. Check frequent labs, IV fluids (for now normal saline and change to D5 half-normal saline when blood sugar reaches below 200). Check A1c and consult diabetic treatment specialist.    2.   pseudohyponatremia (3/26/20). corrected sodium for blood sugar is 137. Continue IV fluids monitor. 3.  KELSIE (acute kidney injury) ([facility] (3/26/2020)/dehydration. Likely secondary to IVVD. Continue IV fluids and check frequent labs. Avoid nephrotoxic drugs    4. Anxiety (3/26/2020)/ Depression (3/26/2020). Continue home medications    5. HTN (hypertension) (3/26/2020). Continue home medications    6. GERD (gastroesophageal reflux disease) (3/26/2020). Continue PPI    7. Microcytic anemia (3/26/2020). Check iron studies, vitamin B12, folate, stool for occult blood. 8.  Metabolic encephalopathy, mild. Likely due to DKA/ dehydration.  Continue to monitor          Previous medical records reviewed     Risk of deterioration: high      Total time spent with patient: 69 [address] [facility] discussed with: Patient, Family, Nursing Staff and >50% of time spent in counseling and coordination of care    Discussed:  Care Plan    Prophylaxis:  Hep SQ    Probable Disposition:  Home w/Family           ___________________________________________________    Attending Physician: Sada Nicole MD

## 2020-03-26 NOTE — DIABETES MGMT
[facility] SPECIALIST CONSULT    Presentation   Josie Fuller is a 65 y.o. female presented to the ED with high blood sugar, confusion and decreased oral intake, found to be in DKA with  no gap, + ketones in urine. PMH is DM, anxiety, depression, HTN, GERD and stroke. Patient has known diabetes. Consulted by Provider for advanced diabetes nursing assessment and care, specifically related to     [x] Inpatient management strategy  [x] Home management assessment    Current clinical course has been uncomplicated. Diabetes-related medical history  Acute complications  DKA  Neurological complications  Peripheral neuropathy  Microvascular disease  Nephropathy  Macrovascular disease  Cerebral vascular accident  Other associated conditions     Depression    Diabetes medication history  Drug class Currently in use Discontinued Never used   Biguanide  x    DDP-4 inhibitor    x   Sulfonylurea   x   Thiazolidinedione   x   GLP-1 RA   x   SGLT-2 inhibitors   x   Basal insulin x     Bolus insulin  x      Subjective   Patient confused, daughter at bedside to provide additional history. Patient reports the following home diabetes self-care practices:  Eating pattern  Daughter reports that patient eats 2 fried eggs with sausage for breakfast every day except the weekends. On the weekends patient has a Flourish Prenatal sausage croissant. Daughter reports that patient will either eat a big meal at lunch or dinner but varies day to day which is the meal she eats. Typically a protein with vegetable and other side. Daughter reports patient snacks on fruit the rest of the day. Per daughter patient will eat several bananas and apples during the day and 4-5 \"halo\" or \"cutie\" oranges. Physical activity pattern  No specific exercise regime at home. Daughter reports that they will walk around the house or short walks outside when she was feel well. Monitoring pattern  BG's checked twice a day.   100-110's average fasting blood sugar. Taking medications pattern  [x] Consistent administration  [x] Affordable    Social determinants of health impacting diabetes self-management practices   None at this time. Objective   Physical exam  General Alert, oriented to person and place and in no acute distress. Conversant and cooperative  Vital Signs   Visit Vitals  /52 (BP 1 Location: Right arm, BP Patient Position: At rest)   Pulse (!) 108   Temp 98.3 °F (36.8 °C)   Resp 16   Ht 5' 2\" (1.575 m)   Wt 70.3 kg (155 lb)   SpO2 100%   BMI 28.35 kg/m²   . Orthostatic BP measurement not indicated  Skin  Warm and dry. No Acanthosis noted along neckline. No lipohypertrophy or lipoatrophy noted at injection sites   Neck   Thyroid smooth and non-tender  Heart   Regular rate and rhythm. No murmurs, rubs or gallops  Lungs  Clear without rales or rhonchi  Extremities Diabetic foot exam:    Left Foot     Visual Exam: normal    Pulse DP: 1+ (weak)   Filament test: reduced sensation      Right Foot   Visual Exam: normal    Pulse DP: 1+ (weak)   Filament test: reduced sensation    DP & PT pulses +2.      Laboratory  Lab Results   Component Value Date/Time    Hemoglobin A1c (POC) 11.6 11/18/2019 04:40 PM     Lab Results   Component Value Date/Time    LDL, calculated 70 12/30/2019 12:00 AM     Lab Results   Component Value Date/Time    Creatinine 1.75 (H) 03/26/2020 11:14 AM     Lab Results   Component Value Date/Time    Sodium 127 (L) 03/26/2020 11:14 AM    Potassium 5.2 (H) 03/26/2020 11:14 AM    Chloride 96 (L) 03/26/2020 11:14 AM    CO2 17 (L) 03/26/2020 11:14 AM    Anion gap 14 03/26/2020 11:14 AM    Glucose 533 (H) 03/26/2020 11:14 AM    BUN 36 (H) 03/26/2020 11:14 AM    Creatinine 1.75 (H) 03/26/2020 11:14 AM    BUN/Creatinine ratio 21 (H) 03/26/2020 11:14 AM    GFR est AA 35 (L) 03/26/2020 11:14 AM    GFR est non-AA 29 (L) 03/26/2020 11:14 AM    Calcium 9.7 03/26/2020 11:14 AM    Bilirubin, total 1.2 (H) 03/26/2020 11:14 AM AST (SGOT) 57 (H) 03/26/2020 11:14 AM    Alk. phosphatase 241 (H) 03/26/2020 11:14 AM    Protein, total 9.1 (H) 03/26/2020 11:14 AM    Albumin 3.6 03/26/2020 11:14 AM    Globulin 5.5 (H) 03/26/2020 11:14 AM    A-G Ratio 0.7 (L) 03/26/2020 11:14 AM    ALT (SGPT) 40 03/26/2020 11:14 AM     Lab Results   Component Value Date/Time    ALT (SGPT) 40 03/26/2020 11:14 AM       Blood glucose pattern      Assessment and Plan   Nursing Diagnosis Risk for unstable blood glucose pattern   Nursing Intervention Domain 5253 Decision-making Support   Nursing Interventions Examined current inpatient diabetes control   Explored factors facilitating and impeding inpatient management  Identified self-management practices impeding diabetes control  Explored corrective strategies with patient and responsible inpatient provider        Evaluation   This 65year old female, with known diabetes hasn't achieved inpatient blood glucose target of 140-180mg/dl due to just arriving to hospital for evaluation and admission approximately 1045 am today. Insulin drip was started in the ED using glucostabilizer.     Glocostabilizer and insulin drip started at 1403 today at a rate of 8.5 units/hour, decreased to 5 units/hour at 1510 today.      Patient does not have bolus ordered at this time. Corrective insulin is in use but has not required any corrective insulin due to insulin drip and glucostabilizer. Daughter reports that patient is no longer taking Metformin due to the stomach discomfort it was causing. Reports patient only takes 60 units of Levemir at bedtime. Inpatient blood glucose management has been impacted by    [x] Erratic meal consumption      Recommendations     Continue insulin drip and glucostabilizer at this time. Will continue to follow and monitor patient and provide recommendations patient transitioned off of insulin drip.         Consider referrals    [x] Diabetes Self-Management Training through [facility] (Phone [phone] to schedule appointment)    Billing Code(s)   [facility]  Access via [employer]  [phone]

## 2020-03-26 NOTE — PROGRESS NOTES
Pt arrived to unit. No report given to this RN. Primary Nurse Ariadne Gill RN and Teagan Shankar RN performed a dual skin assessment on this patient No impairment noted  Tiburcio score is 17    Dysphagia score : 0    1620:  Per dr. Lindsey Wood keep patient on insulin drip over     1700: daughter at bedside attempting to \"give momma her morning meds\"   Educated daughter that we will provide medicine while inpatient  Pharmacy to call to bedside to complete med rec. Bedside and Verbal shift change report given to Maurizio Wharton (oncoming nurse) by Lorraine Cota RN (offgoing nurse). Report included the following information SBAR, Kardex and ED Summary.

## 2020-03-26 NOTE — ED PROVIDER NOTES
The history is provided by a relative and the patient. High Blood Sugar    This is a new problem. The current episode started more than 2 days ago. The problem occurs constantly. The problem has not changed since onset. Associated with: mental status changes intermittently. dietary indiscretion, changes in insulin therapy regimen. The patient is experiencing no pain. Pertinent negatives include no fever, no diarrhea, no vomiting, no dysuria, no frequency, no hematuria and no headaches. Altered mental status    This is a new problem. The current episode started 2 days ago. Progression since onset: waxing and waning. Associated symptoms include confusion and somnolence. Mental status baseline is normal.         Past Medical History:   Diagnosis Date    Anxiety     Auditory hallucinations     Depression     Diabetes [facility] [address]     Esophageal reflux     Hypertension     Stroke ([facility] [address] 01/2018       History reviewed. No pertinent surgical history. History reviewed. No pertinent family history.     Social History     Socioeconomic History    Marital status:      Spouse name: Not on file    Number of children: Not on file    Years of education: Not on file    Highest education level: Not on file   Occupational History    Not on file   Social Needs    Financial resource strain: Not on file    Food insecurity     Worry: Not on file     Inability: Not on file    Transportation needs     Medical: Not on file     Non-medical: Not on file   Tobacco Use    Smoking status: Never Smoker    Smokeless tobacco: Never Used   Substance and Sexual Activity    Alcohol use: Not Currently    Drug use: Not Currently    Sexual activity: Not Currently   Lifestyle    Physical activity     Days per week: Not on file     Minutes per session: Not on file    Stress: Not on file   Relationships    Social connections     Talks on phone: Not on file     Gets together: Not on file     Attends Yarsanism service: Not on file Active member of club or organization: Not on file     Attends meetings of clubs or organizations: Not on file     Relationship status: Not on file    Intimate partner violence     Fear of current or ex partner: Not on file     Emotionally abused: Not on file     Physically abused: Not on file     Forced sexual activity: Not on file   Other Topics Concern    Not on file   Social History Narrative    Not on file         ALLERGIES: Oxycodone    Review of Systems   Constitutional: Negative for fever. Gastrointestinal: Negative for diarrhea and vomiting. Genitourinary: Negative for dysuria, frequency and hematuria. Neurological: Negative for headaches. Psychiatric/Behavioral: Positive for confusion. All other systems reviewed and are negative. Vitals:    03/26/20 1021   BP: 135/43   Pulse: (!) 108   Resp: 16   Temp: 98.3 °F (36.8 °C)   SpO2: 100%   Weight: 70.3 kg (155 lb)   Height: 5' 2\" (1.575 m)            Physical Exam  Vitals signs and nursing note reviewed. Constitutional:       General: She is not in acute distress. Appearance: She is well-developed. HENT:      Head: Normocephalic and atraumatic. Eyes:      Conjunctiva/sclera: Conjunctivae normal.   Neck:      Musculoskeletal: Neck supple. Cardiovascular:      Rate and Rhythm: Normal rate and regular rhythm. Heart sounds: Normal heart sounds. Pulmonary:      Effort: Pulmonary effort is normal. No respiratory distress. Breath sounds: Normal breath sounds. Abdominal:      General: There is no distension. Tenderness: There is no abdominal tenderness. Musculoskeletal: Normal range of motion. General: No deformity. Skin:     General: Skin is warm and dry. Neurological:      Mental Status: She is alert. Cranial Nerves: No cranial nerve deficit.    Psychiatric:         Behavior: Behavior normal.          MDM     75-year-old female presents with increasing intermittent altered mental status and poorly controlled glucose over the last week due to dietary indiscretion with scarcity of food at home and increased soda consumption. Her blood sugar has been running over 500 for several days now, she has had polyuria and appears to be dehydrated with notable mild KELSIE from baseline with relative mild hyperkalemia. Fluid resuscitation started as well as insulin infusion, no signs of DKA currently. Procedures    Hospitalist Perfect Serve for Admission  12:32 PM    ED Room Number: ER07/07  Patient Name and age:  Dev Lowry 65 y.o.  female  Working Diagnosis:   1. Hyperglycemia without ketosis    2. KELSIE (acute kidney injury) ([facility] [address]    3.  Encephalopathy      COVID-19 Suspicion:  no  Readmission: no  Isolation Requirements:  no  Recommended Level of Care:  telemetry  Code Status:  Full Code  Department:[facility] ED - [phone]

## 2020-03-27 ENCOUNTER — APPOINTMENT (OUTPATIENT)
Dept: MRI IMAGING | Age: 70
DRG: 637 | End: 2020-03-27
Attending: INTERNAL MEDICINE
Payer: MEDICARE

## 2020-03-27 ENCOUNTER — APPOINTMENT (OUTPATIENT)
Dept: VASCULAR SURGERY | Age: 70
DRG: 637 | End: 2020-03-27
Attending: INTERNAL MEDICINE
Payer: MEDICARE

## 2020-03-27 ENCOUNTER — HOSPITAL ENCOUNTER (OUTPATIENT)
Dept: MRI IMAGING | Age: 70
Discharge: HOME | DRG: 637 | End: 2020-03-27
Attending: INTERNAL MEDICINE
Payer: MEDICARE

## 2020-03-27 VITALS
HEIGHT: 62 IN | HEART RATE: 105 BPM | OXYGEN SATURATION: 100 % | WEIGHT: 155 LBS | SYSTOLIC BLOOD PRESSURE: 135 MMHG | DIASTOLIC BLOOD PRESSURE: 65 MMHG | BODY MASS INDEX: 28.52 KG/M2 | TEMPERATURE: 98.5 F | RESPIRATION RATE: 15 BRPM

## 2020-03-27 PROBLEM — E11.10 DKA (DIABETIC KETOACIDOSES): Status: RESOLVED | Noted: 2020-03-26 | Resolved: 2020-03-27

## 2020-03-27 LAB
ADMINISTERED INITIALS, ADMINIT: NORMAL
ALBUMIN SERPL-MCNC: 3 G/DL (ref 3.5–5)
ALBUMIN/GLOB SERPL: 0.7 {RATIO} (ref 1.1–2.2)
ALP SERPL-CCNC: 139 U/L (ref 45–117)
ALT SERPL-CCNC: 34 U/L (ref 12–78)
ANION GAP SERPL CALC-SCNC: 8 MMOL/L (ref 5–15)
ANION GAP SERPL CALC-SCNC: 9 MMOL/L (ref 5–15)
APPEARANCE UR: CLEAR
AST SERPL-CCNC: 60 U/L (ref 15–37)
BACTERIA URNS QL MICRO: ABNORMAL /HPF
BILIRUB DIRECT SERPL-MCNC: 0.3 MG/DL (ref 0–0.2)
BILIRUB SERPL-MCNC: 1 MG/DL (ref 0.2–1)
BILIRUB UR QL: NEGATIVE
BUN SERPL-MCNC: 21 MG/DL (ref 6–20)
BUN SERPL-MCNC: 23 MG/DL (ref 6–20)
BUN/CREAT SERPL: 18 (ref 12–20)
BUN/CREAT SERPL: 19 (ref 12–20)
CALCIUM SERPL-MCNC: 8.8 MG/DL (ref 8.5–10.1)
CALCIUM SERPL-MCNC: 8.8 MG/DL (ref 8.5–10.1)
CHLORIDE SERPL-SCNC: 105 MMOL/L (ref 97–108)
CHLORIDE SERPL-SCNC: 108 MMOL/L (ref 97–108)
CO2 SERPL-SCNC: 21 MMOL/L (ref 21–32)
CO2 SERPL-SCNC: 21 MMOL/L (ref 21–32)
COLOR UR: ABNORMAL
CREAT SERPL-MCNC: 1.11 MG/DL (ref 0.55–1.02)
CREAT SERPL-MCNC: 1.25 MG/DL (ref 0.55–1.02)
D50 ADMINISTERED, D50ADM: 0 ML
D50 ORDER, D50ORD: 0 ML
EPITH CASTS URNS QL MICRO: ABNORMAL /LPF
ERYTHROCYTE [DISTWIDTH] IN BLOOD BY AUTOMATED COUNT: 18 % (ref 11.5–14.5)
GLOBULIN SER CALC-MCNC: 4.6 G/DL (ref 2–4)
GLSCOM COMMENTS: NORMAL
GLUCOSE BLD STRIP.AUTO-MCNC: 130 MG/DL (ref 65–100)
GLUCOSE BLD STRIP.AUTO-MCNC: 131 MG/DL (ref 65–100)
GLUCOSE BLD STRIP.AUTO-MCNC: 177 MG/DL (ref 65–100)
GLUCOSE BLD STRIP.AUTO-MCNC: 201 MG/DL (ref 65–100)
GLUCOSE BLD STRIP.AUTO-MCNC: 214 MG/DL (ref 65–100)
GLUCOSE BLD STRIP.AUTO-MCNC: 244 MG/DL (ref 65–100)
GLUCOSE BLD STRIP.AUTO-MCNC: 246 MG/DL (ref 65–100)
GLUCOSE BLD STRIP.AUTO-MCNC: 251 MG/DL (ref 65–100)
GLUCOSE BLD STRIP.AUTO-MCNC: 265 MG/DL (ref 65–100)
GLUCOSE BLD STRIP.AUTO-MCNC: 269 MG/DL (ref 65–100)
GLUCOSE BLD STRIP.AUTO-MCNC: 298 MG/DL (ref 65–100)
GLUCOSE SERPL-MCNC: 110 MG/DL (ref 65–100)
GLUCOSE SERPL-MCNC: 210 MG/DL (ref 65–100)
GLUCOSE UR STRIP.AUTO-MCNC: 250 MG/DL
GLUCOSE, GLC: 130 MG/DL
GLUCOSE, GLC: 131 MG/DL
GLUCOSE, GLC: 177 MG/DL
GLUCOSE, GLC: 201 MG/DL
GLUCOSE, GLC: 214 MG/DL
GLUCOSE, GLC: 246 MG/DL
GLUCOSE, GLC: 251 MG/DL
GLUCOSE, GLC: 265 MG/DL
GLUCOSE, GLC: 269 MG/DL
GLUCOSE, GLC: 298 MG/DL
HCT VFR BLD AUTO: 23.8 % (ref 35–47)
HEMOCCULT STL QL: POSITIVE
HGB BLD-MCNC: 7.4 G/DL (ref 11.5–16)
HGB UR QL STRIP: NEGATIVE
HIGH TARGET, HITG: 250 MG/DL
HYALINE CASTS URNS QL MICRO: ABNORMAL /LPF (ref 0–5)
INSULIN ADMINSTERED, INSADM: 0.7 UNITS/HOUR
INSULIN ADMINSTERED, INSADM: 1.2 UNITS/HOUR
INSULIN ADMINSTERED, INSADM: 1.4 UNITS/HOUR
INSULIN ADMINSTERED, INSADM: 1.5 UNITS/HOUR
INSULIN ADMINSTERED, INSADM: 1.9 UNITS/HOUR
INSULIN ADMINSTERED, INSADM: 2.1 UNITS/HOUR
INSULIN ADMINSTERED, INSADM: 3.8 UNITS/HOUR
INSULIN ADMINSTERED, INSADM: 4.2 UNITS/HOUR
INSULIN ADMINSTERED, INSADM: 4.8 UNITS/HOUR
INSULIN ADMINSTERED, INSADM: 6.2 UNITS/HOUR
INSULIN ORDER, INSORD: 0.7 UNITS/HOUR
INSULIN ORDER, INSORD: 1.2 UNITS/HOUR
INSULIN ORDER, INSORD: 1.4 UNITS/HOUR
INSULIN ORDER, INSORD: 1.5 UNITS/HOUR
INSULIN ORDER, INSORD: 1.9 UNITS/HOUR
INSULIN ORDER, INSORD: 2.1 UNITS/HOUR
INSULIN ORDER, INSORD: 3.8 UNITS/HOUR
INSULIN ORDER, INSORD: 4.2 UNITS/HOUR
INSULIN ORDER, INSORD: 4.8 UNITS/HOUR
INSULIN ORDER, INSORD: 6.2 UNITS/HOUR
KETONES UR QL STRIP.AUTO: 40 MG/DL
LEUKOCYTE ESTERASE UR QL STRIP.AUTO: ABNORMAL
LOW TARGET, LOT: 150 MG/DL
MCH RBC QN AUTO: 24.6 PG (ref 26–34)
MCHC RBC AUTO-ENTMCNC: 31.1 G/DL (ref 30–36.5)
MCV RBC AUTO: 79.1 FL (ref 80–99)
MINUTES UNTIL NEXT BG, NBG: 60 MIN
MULTIPLIER, MUL: 0.01
MULTIPLIER, MUL: 0.02
MULTIPLIER, MUL: 0.03
MULTIPLIER, MUL: 0.03
NITRITE UR QL STRIP.AUTO: NEGATIVE
NRBC # BLD: 0 K/UL (ref 0–0.01)
NRBC BLD-RTO: 0 PER 100 WBC
ORDER INITIALS, ORDINIT: NORMAL
PH UR STRIP: 5.5 [PH] (ref 5–8)
PLATELET # BLD AUTO: 204 K/UL (ref 150–400)
PMV BLD AUTO: 10.6 FL (ref 8.9–12.9)
POTASSIUM SERPL-SCNC: 3.6 MMOL/L (ref 3.5–5.1)
POTASSIUM SERPL-SCNC: 3.9 MMOL/L (ref 3.5–5.1)
PROT SERPL-MCNC: 7.6 G/DL (ref 6.4–8.2)
PROT UR STRIP-MCNC: NEGATIVE MG/DL
RBC # BLD AUTO: 3.01 M/UL (ref 3.8–5.2)
RBC #/AREA URNS HPF: ABNORMAL /HPF (ref 0–5)
SERVICE CMNT-IMP: ABNORMAL
SODIUM SERPL-SCNC: 135 MMOL/L (ref 136–145)
SODIUM SERPL-SCNC: 137 MMOL/L (ref 136–145)
SP GR UR REFRACTOMETRY: 1.02 (ref 1–1.03)
UA: UC IF INDICATED,UAUC: ABNORMAL
UROBILINOGEN UR QL STRIP.AUTO: 1 EU/DL (ref 0.2–1)
WBC # BLD AUTO: 6.6 K/UL (ref 3.6–11)
WBC URNS QL MICRO: ABNORMAL /HPF (ref 0–4)

## 2020-03-27 PROCEDURE — 82272 OCCULT BLD FECES 1-3 TESTS: CPT

## 2020-03-27 PROCEDURE — 85027 COMPLETE CBC AUTOMATED: CPT

## 2020-03-27 PROCEDURE — 80048 BASIC METABOLIC PNL TOTAL CA: CPT

## 2020-03-27 PROCEDURE — 82962 GLUCOSE BLOOD TEST: CPT

## 2020-03-27 PROCEDURE — 74011000258 HC RX REV CODE- 258: Performed by: INTERNAL MEDICINE

## 2020-03-27 PROCEDURE — 70551 MRI BRAIN STEM W/O DYE: CPT

## 2020-03-27 PROCEDURE — 74011250636 HC RX REV CODE- 250/636: Performed by: INTERNAL MEDICINE

## 2020-03-27 PROCEDURE — 87086 URINE CULTURE/COLONY COUNT: CPT

## 2020-03-27 PROCEDURE — 80076 HEPATIC FUNCTION PANEL: CPT

## 2020-03-27 PROCEDURE — 70544 MR ANGIOGRAPHY HEAD W/O DYE: CPT

## 2020-03-27 PROCEDURE — 81001 URINALYSIS AUTO W/SCOPE: CPT

## 2020-03-27 PROCEDURE — 93880 EXTRACRANIAL BILAT STUDY: CPT

## 2020-03-27 PROCEDURE — 74011250637 HC RX REV CODE- 250/637: Performed by: INTERNAL MEDICINE

## 2020-03-27 PROCEDURE — 36415 COLL VENOUS BLD VENIPUNCTURE: CPT

## 2020-03-27 PROCEDURE — 74011636637 HC RX REV CODE- 636/637: Performed by: INTERNAL MEDICINE

## 2020-03-27 RX ORDER — SERTRALINE HYDROCHLORIDE 50 MG/1
50 TABLET, FILM COATED ORAL
Status: DISCONTINUED | OUTPATIENT
Start: 2020-03-27 | End: 2020-03-27 | Stop reason: HOSPADM

## 2020-03-27 RX ORDER — INSULIN GLARGINE 100 [IU]/ML
20 INJECTION, SOLUTION SUBCUTANEOUS DAILY
Status: DISCONTINUED | OUTPATIENT
Start: 2020-03-27 | End: 2020-03-27

## 2020-03-27 RX ORDER — SERTRALINE HYDROCHLORIDE 50 MG/1
50 TABLET, FILM COATED ORAL
COMMUNITY
End: 2020-04-22 | Stop reason: SDUPTHER

## 2020-03-27 RX ORDER — CEFUROXIME AXETIL 500 MG/1
250 TABLET ORAL 2 TIMES DAILY
Qty: 8 TAB | Refills: 0 | Status: SHIPPED | OUTPATIENT
Start: 2020-03-27 | End: 2020-04-22 | Stop reason: ALTCHOICE

## 2020-03-27 RX ORDER — INSULIN GLARGINE 100 [IU]/ML
20 INJECTION, SOLUTION SUBCUTANEOUS DAILY
Status: DISCONTINUED | OUTPATIENT
Start: 2020-03-27 | End: 2020-03-27 | Stop reason: HOSPADM

## 2020-03-27 RX ORDER — INSULIN LISPRO 100 [IU]/ML
INJECTION, SOLUTION INTRAVENOUS; SUBCUTANEOUS
Status: DISCONTINUED | OUTPATIENT
Start: 2020-03-27 | End: 2020-03-27 | Stop reason: HOSPADM

## 2020-03-27 RX ORDER — SERTRALINE HYDROCHLORIDE 50 MG/1
25 TABLET, FILM COATED ORAL DAILY
COMMUNITY
End: 2020-03-30

## 2020-03-27 RX ORDER — PANTOPRAZOLE SODIUM 40 MG/1
40 TABLET, DELAYED RELEASE ORAL
Status: DISCONTINUED | OUTPATIENT
Start: 2020-03-28 | End: 2020-03-27 | Stop reason: HOSPADM

## 2020-03-27 RX ORDER — MAGNESIUM SULFATE 100 %
4 CRYSTALS MISCELLANEOUS AS NEEDED
Status: DISCONTINUED | OUTPATIENT
Start: 2020-03-27 | End: 2020-03-27 | Stop reason: HOSPADM

## 2020-03-27 RX ORDER — INSULIN GLARGINE 100 [IU]/ML
30 INJECTION, SOLUTION SUBCUTANEOUS DAILY
Status: DISCONTINUED | OUTPATIENT
Start: 2020-03-27 | End: 2020-03-27

## 2020-03-27 RX ORDER — CLOPIDOGREL BISULFATE 75 MG/1
75 TABLET ORAL DAILY
Status: DISCONTINUED | OUTPATIENT
Start: 2020-03-28 | End: 2020-03-27 | Stop reason: HOSPADM

## 2020-03-27 RX ORDER — SERTRALINE HYDROCHLORIDE 50 MG/1
25 TABLET, FILM COATED ORAL DAILY
Status: DISCONTINUED | OUTPATIENT
Start: 2020-03-27 | End: 2020-03-27 | Stop reason: HOSPADM

## 2020-03-27 RX ORDER — QUETIAPINE FUMARATE 25 MG/1
50 TABLET, FILM COATED ORAL
Status: DISCONTINUED | OUTPATIENT
Start: 2020-03-27 | End: 2020-03-27 | Stop reason: HOSPADM

## 2020-03-27 RX ORDER — DEXTROSE MONOHYDRATE 100 MG/ML
0-250 INJECTION, SOLUTION INTRAVENOUS AS NEEDED
Status: DISCONTINUED | OUTPATIENT
Start: 2020-03-27 | End: 2020-03-27 | Stop reason: HOSPADM

## 2020-03-27 RX ORDER — ONDANSETRON 4 MG/1
4 TABLET, ORALLY DISINTEGRATING ORAL
Status: DISCONTINUED | OUTPATIENT
Start: 2020-03-27 | End: 2020-03-27 | Stop reason: HOSPADM

## 2020-03-27 RX ORDER — SERTRALINE HYDROCHLORIDE 50 MG/1
50 TABLET, FILM COATED ORAL DAILY
Status: DISCONTINUED | OUTPATIENT
Start: 2020-03-27 | End: 2020-03-27

## 2020-03-27 RX ORDER — AMLODIPINE BESYLATE 5 MG/1
10 TABLET ORAL DAILY
Status: CANCELLED | OUTPATIENT
Start: 2020-03-27

## 2020-03-27 RX ADMIN — CEFTRIAXONE SODIUM 1 G: 1 INJECTION, POWDER, FOR SOLUTION INTRAMUSCULAR; INTRAVENOUS at 08:01

## 2020-03-27 RX ADMIN — INSULIN LISPRO 3 UNITS: 100 INJECTION, SOLUTION INTRAVENOUS; SUBCUTANEOUS at 11:42

## 2020-03-27 RX ADMIN — HEPARIN SODIUM 5000 UNITS: 5000 INJECTION INTRAVENOUS; SUBCUTANEOUS at 08:00

## 2020-03-27 RX ADMIN — DEXTROSE MONOHYDRATE AND SODIUM CHLORIDE 125 ML/HR: 5; .45 INJECTION, SOLUTION INTRAVENOUS at 01:23

## 2020-03-27 RX ADMIN — QUETIAPINE FUMARATE 50 MG: 25 TABLET ORAL at 02:01

## 2020-03-27 RX ADMIN — SERTRALINE HYDROCHLORIDE 25 MG: 50 TABLET ORAL at 08:38

## 2020-03-27 RX ADMIN — Medication 10 ML: at 05:03

## 2020-03-27 RX ADMIN — INSULIN GLARGINE 20 UNITS: 100 INJECTION, SOLUTION SUBCUTANEOUS at 08:01

## 2020-03-27 NOTE — PROGRESS NOTES
Care Management follow up    Patient admitted for DKA, KELSIE. A1C=10.8. Hx diabetes, anxiety, depression, HTN, GERD. RUR score 26%/mod    Current status  Patient currently on insulin infusion to control blood sugars. Right sided weakness and dysarthria being evaluated, MRI of brain ordered. Remains on IV antibiotics. Transition of Care Plan    1. Monitor patient response to treatment and recommendations. 2. Await further evaluation including MRI of brain, diabetic educator consult. 3. Patient is appropriate for home health nursing follow up to assist with diabetes management order required. 4. Patient transport home per daughter at discharge. 4. CM to monitor for discharge needs.     Anthony Hernandez, RN, MSN, Care manager

## 2020-03-27 NOTE — PROGRESS NOTES
Bedside and Verbal shift change report given to Anand (oncoming nurse) by Oumar Elizalde (offgoing nurse).  Report included the following information SBAR, Kardex, Intake/Output, MAR, Recent Results and Cardiac Rhythm SR.

## 2020-03-27 NOTE — DISCHARGE SUMMARY
Hospitalist Discharge Summary     Patient ID:    Nini Nayak  [id number]  65 y.o.  1950    Admit date of service: 3/26/2020    Discharge date of service: 3/27/2020    Admission Diagnoses: DKA (diabetic ketoacidoses) ([facility] [E11.10]  DKA (diabetic ketoacidoses) ([facility] [E11.10]    Chronic Diagnoses:    Problem List as of 3/27/2020 Date Reviewed: 3/26/2020          Codes Class Noted - Resolved    Hyponatremia ICD-10-CM: E87.1  ICD-9-CM: 276.1  3/26/2020 - Present        KELSIE (acute kidney injury) ([place] [address] ICD-10-CM: N17.9  ICD-9-CM: 584.9  3/26/2020 - Present        Anxiety ICD-10-CM: F41.9  ICD-9-CM: 300.00  3/26/2020 - Present        Depression ICD-10-CM: F32.9  ICD-9-CM: 084  3/26/2020 - Present        HTN (hypertension) ICD-10-CM: I10  ICD-9-CM: 401.9  3/26/2020 - Present        GERD (gastroesophageal reflux disease) ICD-10-CM: K21.9  ICD-9-CM: 530.81  3/26/2020 - Present        Microcytic anemia ICD-10-CM: D50.9  ICD-9-CM: 280.9  3/26/2020 - Present        RESOLVED: DKA (diabetic ketoacidoses) ([place] [address] ICD-10-CM: E11.10  ICD-9-CM: 250.10  3/26/2020 - 3/27/2020              Discharge Medications:   Current Discharge Medication List      START taking these medications    Details   cefUROXime (CEFTIN) 500 mg tablet Take 0.5 Tabs by mouth two (2) times a day. Qty: 8 Tab, Refills: 0         CONTINUE these medications which have CHANGED    Details   insulin detemir U-100 (LEVEMIR FLEXTOUCH) 100 unit/mL (3 mL) inpn 40 Units by SubCUTAneous route nightly. Qty: 1 Adjustable Dose Pre-filled Pen Syringe, Refills: 0         CONTINUE these medications which have NOT CHANGED    Details   !! sertraline (ZOLOFT) 50 mg tablet Take 25 mg by mouth daily. Sertraline 1/2 tablet in the morning and 1 tablet at bedtime. !! sertraline (ZOLOFT) 50 mg tablet Take 50 mg by mouth nightly. Sertraline 1/2 tablet in the morning and 1 tablet at bedtime.       acetaminophen (TYLENOL) 500 mg tablet Take 1,500 mg by mouth two (2) times daily as needed for Pain. clopidogreL (PLAVIX) 75 mg tab TAKE 1 TABLET BY MOUTH DAILY  Qty: 30 Tab, Refills: 2      lisinopril (PRINIVIL, ZESTRIL) 30 mg tablet Take 1 Tab by mouth daily. Qty: 90 Tab, Refills: 1      pantoprazole (PROTONIX) 40 mg tablet Take 1 Tab by mouth daily. Qty: 90 Tab, Refills: 1      amLODIPine (NORVASC) 10 mg tablet amlodipine 10 mg tablet  Qty: 90 Tab, Refills: 1      QUEtiapine (SEROQUEL) 50 mg tablet Take 1 Tab by mouth two (2) times a day. Qty: 60 Tab, Refills: 2    Associated Diagnoses: Auditory hallucination       !! - Potential duplicate medications found. Please discuss with provider. Follow up Care:    1. Ana Barnard MD in 1-2 weeks  2. Diet:  Diabetic Diet    Disposition:  Home. Advanced Directive:    Discharge Exam:  See today's note. CONSULTATIONS: None    Significant Diagnostic Studies:   Recent Labs     03/27/20  0435 03/26/20  1114   WBC 6.6 7.1   HGB 7.4* 8.6*   HCT 23.8* 27.6*    224     Recent Labs     03/27/20  0848 03/27/20  0435 03/26/20  2243 03/26/20  1114   * 137 135* 127*   K 3.9 3.6 4.3 5.2*    108 105 96*   CO2 21 21 20* 17*   BUN 21* 23* 28* 36*   CREA 1.11* 1.25* 1.19* 1.75*   * 110* 212* 533*   CA 8.8 8.8 9.4 9.7   MG  --   --   --  2.0   PHOS  --   --  2.8  --      Recent Labs     03/27/20  0435 03/26/20  1114   SGOT 60* 57*   ALT 34 40   * 241*   TBILI 1.0 1.2*   TP 7.6 9.1*   ALB 3.0* 3.6   GLOB 4.6* 5.5*     No results for input(s): INR, PTP, APTT, INREXT in the last 72 hours. No results for input(s): FE, TIBC, PSAT, FERR in the last 72 hours. No results for input(s): PH, PCO2, PO2 in the last 72 hours. No results for input(s): CPK, CKMB in the last 72 hours.     No lab exists for component: TROPONINI  Lab Results   Component Value Date/Time    Glucose (POC) 244 (H) 03/27/2020 11:33 AM    Glucose (POC) 251 (H) 03/27/2020 10:18 AM    Glucose (POC) 246 (H) 03/27/2020 09:03 AM    Glucose (POC) 214 (H) 03/27/2020 08:02 AM    Glucose (POC) 177 (H) 03/27/2020 06:46 AM             HOSPITAL COURSE & TREATMENT RENDERED:   1.  DKA (diabetic ketoacidoses) ([facility] [address] (3/26/2020).   This is most likely due to noncompliance with her diet and insulin in take.  Resolved with insulin drip. Resume home insulin. A1c 10. 8. appreciated diabetic treatment specialist evaluation.     2.   pseudohyponatremia (3/26/20)(POA). Now better. corrected sodium for blood sugar is 137 on admission. Continue IV fluids monitor.     3.  KELSIE (acute kidney injury) ([facility] [address] (3/26/2020)/dehydration.  Likely secondary to IVVD.   improving. Avoid nephrotoxic drugs     4.  Anxiety (3/26/2020)/ Depression (3/26/2020).   Continue home zoloft and seroquel     5.  HTN (hypertension) (3/26/2020).   Continue home amlodipine, lisinopril.      6.   GERD (gastroesophageal reflux disease) (3/26/2020).   Continue PPI     7.   Microcytic anemia (3/26/2020).   Check  stool for occult blood. Pt stated that she had colonoscopy in the past. Needs outpatient FU and further workup.      8.  Metabolic encephalopathy, mild. Likely due to DKA/ dehydration. Improved.       9. Dysarthria and RT side weakness( according to pt, the weakness is old). MRI/MRA of the brain and BL carotid doppler unremarkable.            Discharged in improved condition.     Spent 35 minutes    Signed:  Kiarra Anaya MD  3/27/2020  3:50 PM

## 2020-03-27 NOTE — DIABETES MGMT
LUCY PEDERSEN  CLINICAL NURSE SPECIALIST   Followup Progress Note    Presentation   Lisa Omer is a 65 y.o. female presented to the ED with high blood sugar, confusion and decreased oral intake, found to be in DKA with  no gap, + ketones in urine. PMH is DM, anxiety, depression, HTN, GERD and stroke. Patient has known diabetes. Consulted by Provider for advanced specialty nursing care related to inpatient diabetes management. Hyperglycemia management order set is in place. Subjective   I am feeling okay.     Objective   Physical exam    General Alert, oriented to person and place and in no acute distress. Conversant and cooperative  Vital Signs   Visit Vitals  /58 (BP 1 Location: Right arm, BP Patient Position: At rest)   Pulse (!) 104   Temp 98.8 °F (37.1 °C)   Resp 17   Ht 5' 2\" (1.575 m)   Wt 70.3 kg (155 lb)   SpO2 98%   BMI 28.35 kg/m²     Skin  Warm and dry  Heart   Regular rate and rhythm.  No murmurs, rubs or gallops  Lungs  Clear to auscultation without rales or rhonchi  Extremities No foot wounds    Laboratory  Recent Results (from the past 24 hour(s))   VENOUS BLOOD GAS    Collection Time: 03/26/20 11:00 AM   Result Value Ref Range    VENOUS PH 7.43 (H) 7.32 - 7.42      VENOUS PCO2 25 (L) 41 - 51 mmHg    VENOUS PO2 55 (H) 25 - 40 mmHg    VENOUS O2 SATURATION 90 (H) 65 - 88 %    VENOUS BICARBONATE 16 (L) 23 - 28 mmol/L    VENOUS BASE DEFICIT 6.5 mmol/L    O2 METHOD ROOM AIR      Sample source VENOUS      SITE VENOUS     CBC WITH AUTOMATED DIFF    Collection Time: 03/26/20 11:14 AM   Result Value Ref Range    WBC 7.1 3.6 - 11.0 K/uL    RBC 3.49 (L) 3.80 - 5.20 M/uL    HGB 8.6 (L) 11.5 - 16.0 g/dL    HCT 27.6 (L) 35.0 - 47.0 %    MCV 79.1 (L) 80.0 - 99.0 FL    MCH 24.6 (L) 26.0 - 34.0 PG    MCHC 31.2 30.0 - 36.5 g/dL    RDW 18.1 (H) 11.5 - 14.5 %    PLATELET 503 051 - 713 K/uL    MPV 11.2 8.9 - 12.9 FL    NRBC 0.0 0  WBC    ABSOLUTE NRBC 0.00 0.00 - 0.01 K/uL    NEUTROPHILS 71 32 - 75 %    LYMPHOCYTES 22 12 - 49 %    MONOCYTES 6 5 - 13 %    EOSINOPHILS 0 0 - 7 %    BASOPHILS 1 0 - 1 %    IMMATURE GRANULOCYTES 0 0.0 - 0.5 %    ABS. NEUTROPHILS 5.0 1.8 - 8.0 K/UL    ABS. LYMPHOCYTES 1.6 0.8 - 3.5 K/UL    ABS. MONOCYTES 0.4 0.0 - 1.0 K/UL    ABS. EOSINOPHILS 0.0 0.0 - 0.4 K/UL    ABS. BASOPHILS 0.1 0.0 - 0.1 K/UL    ABS. IMM. GRANS. 0.0 0.00 - 0.04 K/UL    DF AUTOMATED     METABOLIC PANEL, COMPREHENSIVE    Collection Time: 03/26/20 11:14 AM   Result Value Ref Range    Sodium 127 (L) 136 - 145 mmol/L    Potassium 5.2 (H) 3.5 - 5.1 mmol/L    Chloride 96 (L) 97 - 108 mmol/L    CO2 17 (L) 21 - 32 mmol/L    Anion gap 14 5 - 15 mmol/L    Glucose 533 (H) 65 - 100 mg/dL    BUN 36 (H) 6 - 20 MG/DL    Creatinine 1.75 (H) 0.55 - 1.02 MG/DL    BUN/Creatinine ratio 21 (H) 12 - 20      GFR est AA 35 (L) >60 ml/min/1.73m2    GFR est non-AA 29 (L) >60 ml/min/1.73m2    Calcium 9.7 8.5 - 10.1 MG/DL    Bilirubin, total 1.2 (H) 0.2 - 1.0 MG/DL    ALT (SGPT) 40 12 - 78 U/L    AST (SGOT) 57 (H) 15 - 37 U/L    Alk. phosphatase 241 (H) 45 - 117 U/L    Protein, total 9.1 (H) 6.4 - 8.2 g/dL    Albumin 3.6 3.5 - 5.0 g/dL    Globulin 5.5 (H) 2.0 - 4.0 g/dL    A-G Ratio 0.7 (L) 1.1 - 2.2     MAGNESIUM    Collection Time: 03/26/20 11:14 AM   Result Value Ref Range    Magnesium 2.0 1.6 - 2.4 mg/dL   TSH 3RD GENERATION    Collection Time: 03/26/20 11:14 AM   Result Value Ref Range    TSH 1.88 0.36 - 3.74 uIU/mL   SAMPLES BEING HELD    Collection Time: 03/26/20 11:14 AM   Result Value Ref Range    SAMPLES BEING HELD 1SST,1RED     COMMENT        Add-on orders for these samples will be processed based on acceptable specimen integrity and analyte stability, which may vary by analyte.    HEMOGLOBIN A1C WITH EAG    Collection Time: 03/26/20 11:14 AM   Result Value Ref Range    Hemoglobin A1c 10.8 (H) 4.0 - 5.6 %    Est. average glucose 263 mg/dL   IRON    Collection Time: 03/26/20 11:14 AM   Result Value Ref Range    Iron 42 35 - 150 ug/dL   VITAMIN B12    Collection Time: 03/26/20 11:14 AM   Result Value Ref Range    Vitamin B12 1,328 (H) 193 - 986 pg/mL   FOLATE    Collection Time: 03/26/20 11:14 AM   Result Value Ref Range    Folate 15.9 5.0 - 21.0 ng/mL   URINALYSIS W/MICROSCOPIC    Collection Time: 03/26/20  1:23 PM   Result Value Ref Range    Color YELLOW/STRAW      Appearance CLEAR CLEAR      Specific gravity 1.021 1.003 - 1.030      pH (UA) 5.5 5.0 - 8.0      Protein NEGATIVE  NEG mg/dL    Glucose >1,000 (A) NEG mg/dL    Ketone 15 (A) NEG mg/dL    Bilirubin NEGATIVE  NEG      Blood NEGATIVE  NEG      Urobilinogen 0.2 0.2 - 1.0 EU/dL    Nitrites NEGATIVE  NEG      Leukocyte Esterase NEGATIVE  NEG      WBC 0-4 0 - 4 /hpf    RBC 0-5 0 - 5 /hpf    Epithelial cells FEW FEW /lpf    Bacteria NEGATIVE  NEG /hpf    Hyaline cast 0-2 0 - 5 /lpf   URINE CULTURE HOLD SAMPLE    Collection Time: 03/26/20  1:23 PM   Result Value Ref Range    Urine culture hold        Urine on hold in Microbiology dept for 2 days. If unpreserved urine is submitted, it cannot be used for addtional testing after 24 hours, recollection will be required.    GLUCOSE, POC    Collection Time: 03/26/20  1:58 PM   Result Value Ref Range    Glucose (POC) 487 (H) 65 - 100 mg/dL    Performed by Nadira Owen St    Collection Time: 03/26/20  2:02 PM   Result Value Ref Range    Glucose 487 mg/dL    Insulin order 8.5 units/hour    Insulin adminstered 8.5 units/hour    Multiplier 0.020     Low target 150 mg/dL    High target 250 mg/dL    D50 order 0.0 ml    D50 administered 0.00 ml    Minutes until next BG 60 min    Order initials AB     Administered initials AB     GLSCOM Comments     GLUCOSE, POC    Collection Time: 03/26/20  2:39 PM   Result Value Ref Range    Glucose (POC) 412 (H) 65 - 100 mg/dL    Performed by Lu Ward    GLUCOSE, POC    Collection Time: 03/26/20  3:08 PM   Result Value Ref Range    Glucose (POC) 312 (H) 65 - 100 mg/dL    Performed by Katalina Marshall Kirsty Gonzalez    Collection Time: 03/26/20  3:09 PM   Result Value Ref Range    Glucose 312 mg/dL    Insulin order 5.0 units/hour    Insulin adminstered 5.0 units/hour    Multiplier 0.020     Low target 150 mg/dL    High target 250 mg/dL    D50 order 0.0 ml    D50 administered 0.00 ml    Minutes until next BG 60 min    Order initials michael     Administered initials michael     GLTREY Comments     GLUCOSE, POC    Collection Time: 03/26/20  4:13 PM   Result Value Ref Range    Glucose (POC) 258 (H) 65 - 100 mg/dL    Performed by Bryce Salamanca    Collection Time: 03/26/20  4:14 PM   Result Value Ref Range    Glucose 258 mg/dL    Insulin order 5.9 units/hour    Insulin adminstered 5.9 units/hour    Multiplier 0.030     Low target 150 mg/dL    High target 250 mg/dL    D50 order 0.0 ml    D50 administered 0.00 ml    Minutes until next BG 60 min    Order initials aisha     Administered initials aisha HALL Comments     GLUCOSE, POC    Collection Time: 03/26/20  5:28 PM   Result Value Ref Range    Glucose (POC) 176 (H) 65 - 100 mg/dL    Performed by Keron Thakurr    Collection Time: 03/26/20  5:29 PM   Result Value Ref Range    Glucose 176 mg/dL    Insulin order 3.5 units/hour    Insulin adminstered 3.5 units/hour    Multiplier 0.030     Low target 150 mg/dL    High target 250 mg/dL    D50 order 0.0 ml    D50 administered 0.00 ml    Minutes until next BG 60 min    Order initials EDUARDO     Administered initials MICHAEL HALL Comments     GLUCOSE, POC    Collection Time: 03/26/20  6:37 PM   Result Value Ref Range    Glucose (POC) 121 (H) 65 - 100 mg/dL    Performed by Keron     Collection Time: 03/26/20  6:37 PM   Result Value Ref Range    Glucose 121 mg/dL    Insulin order 1.2 units/hour    Insulin adminstered 1.2 units/hour    Multiplier 0.020     Low target 150 mg/dL    High target 250 mg/dL    D50 order 0.0 ml    D50 administered 0.00 ml    Minutes until next BG 60 min    Order initials jl     Administered initials jl     GLSCOM Comments jl    GLUCOSE, POC    Collection Time: 03/26/20  7:47 PM   Result Value Ref Range    Glucose (POC) 139 (H) 65 - 100 mg/dL    Performed by Stephan Maddox    Collection Time: 03/26/20  7:48 PM   Result Value Ref Range    Glucose 139 mg/dL    Insulin order 0.8 units/hour    Insulin adminstered 0.8 units/hour    Multiplier 0.010     Low target 150 mg/dL    High target 250 mg/dL    D50 order 0.0 ml    D50 administered 0.00 ml    Minutes until next BG 60 min    Order initials MRS     Administered initials MRS     GLSCOM Comments Adjusted.     GLUCOSE, POC    Collection Time: 03/26/20  8:51 PM   Result Value Ref Range    Glucose (POC) 138 (H) 65 - 100 mg/dL    Performed by Stephan Maddox    Collection Time: 03/26/20  8:54 PM   Result Value Ref Range    Glucose 138 mg/dL    Insulin order 0.0 units/hour    Insulin adminstered 0.0 units/hour    Multiplier 0.000     Low target 150 mg/dL    High target 250 mg/dL    D50 order 0.0 ml    D50 administered 0.00 ml    Minutes until next BG 60 min    Order initials MRS     Administered initials MRS     GLSCOM Comments     GLUCOSE, POC    Collection Time: 03/26/20  9:56 PM   Result Value Ref Range    Glucose (POC) 184 (H) 65 - 100 mg/dL    Performed by Stephan Maddox    Collection Time: 03/26/20  9:58 PM   Result Value Ref Range    Glucose 184 mg/dL    Insulin order 0.1 units/hour    Insulin adminstered 0.1 units/hour    Multiplier 0.000     Low target 150 mg/dL    High target 250 mg/dL    D50 order 0.0 ml    D50 administered 0.00 ml    Minutes until next BG 60 min    Order initials MRS     Administered initials MRS     GLSCOM Comments     METABOLIC PANEL, BASIC    Collection Time: 03/26/20 10:43 PM   Result Value Ref Range    Sodium 135 (L) 136 - 145 mmol/L    Potassium 4.3 3.5 - 5.1 mmol/L    Chloride 105 97 - 108 mmol/L    CO2 20 (L) 21 - 32 mmol/L    Anion gap 10 5 - 15 mmol/L    Glucose 212 (H) 65 - 100 mg/dL    BUN 28 (H) 6 - 20 MG/DL    Creatinine 1.19 (H) 0.55 - 1.02 MG/DL    BUN/Creatinine ratio 24 (H) 12 - 20      GFR est AA 54 (L) >60 ml/min/1.73m2    GFR est non-AA 45 (L) >60 ml/min/1.73m2    Calcium 9.4 8.5 - 10.1 MG/DL   PHOSPHORUS    Collection Time: 03/26/20 10:43 PM   Result Value Ref Range    Phosphorus 2.8 2.6 - 4.7 MG/DL   GLUCOSE, POC    Collection Time: 03/26/20 11:01 PM   Result Value Ref Range    Glucose (POC) 250 (H) 65 - 100 mg/dL    Performed by Evie Champagne    Collection Time: 03/26/20 11:07 PM   Result Value Ref Range    Glucose 250 mg/dL    Insulin order 0.1 units/hour    Insulin adminstered 0.1 units/hour    Multiplier 0.000     Low target 150 mg/dL    High target 250 mg/dL    D50 order 0.0 ml    D50 administered 0.00 ml    Minutes until next BG 60 min    Order initials MRS     Administered initials MRS     GLSCOM Comments     GLUCOSE, POC    Collection Time: 03/27/20 12:15 AM   Result Value Ref Range    Glucose (POC) 269 (H) 65 - 100 mg/dL    Performed by Mahesh Schofield    Collection Time: 03/27/20 12:16 AM   Result Value Ref Range    Glucose 269 mg/dL    Insulin order 2.1 units/hour    Insulin adminstered 2.1 units/hour    Multiplier 0.010     Low target 150 mg/dL    High target 250 mg/dL    D50 order 0.0 ml    D50 administered 0.00 ml    Minutes until next BG 60 min    Order initials rr     Administered initials rr     GLSCOM Comments     URINALYSIS W/ REFLEX CULTURE    Collection Time: 03/27/20 12:20 AM   Result Value Ref Range    Color YELLOW/STRAW      Appearance CLEAR CLEAR      Specific gravity 1.016 1.003 - 1.030      pH (UA) 5.5 5.0 - 8.0      Protein NEGATIVE  NEG mg/dL    Glucose 250 (A) NEG mg/dL    Ketone 40 (A) NEG mg/dL    Bilirubin NEGATIVE  NEG      Blood NEGATIVE  NEG      Urobilinogen 1.0 0.2 - 1.0 EU/dL    Nitrites NEGATIVE  NEG      Leukocyte Esterase MODERATE (A) NEG      WBC 10-20 0 - 4 /hpf    RBC 0-5 0 - 5 /hpf    Epithelial cells FEW FEW /lpf    Bacteria 1+ (A) NEG /hpf    UA:UC IF INDICATED URINE CULTURE ORDERED (A) CNI      Hyaline cast 2-5 0 - 5 /lpf   GLUCOSE, POC    Collection Time: 03/27/20  1:18 AM   Result Value Ref Range    Glucose (POC) 298 (H) 65 - 100 mg/dL    Performed by Ariana DinParticle Code    Collection Time: 03/27/20  1:18 AM   Result Value Ref Range    Glucose 298 mg/dL    Insulin order 4.8 units/hour    Insulin adminstered 4.8 units/hour    Multiplier 0.020     Low target 150 mg/dL    High target 250 mg/dL    D50 order 0.0 ml    D50 administered 0.00 ml    Minutes until next BG 60 min    Order initials rr     Administered initials rr     GLSCOM Comments     GLUCOSE, POC    Collection Time: 03/27/20  2:19 AM   Result Value Ref Range    Glucose (POC) 265 (H) 65 - 100 mg/dL    Performed by Ariana DinParticle Code    Collection Time: 03/27/20  2:21 AM   Result Value Ref Range    Glucose 265 mg/dL    Insulin order 6.2 units/hour    Insulin adminstered 6.2 units/hour    Multiplier 0.030     Low target 150 mg/dL    High target 250 mg/dL    D50 order 0.0 ml    D50 administered 0.00 ml    Minutes until next BG 60 min    Order initials MRS     Administered initials MRS     GLSCOM Comments     GLUCOSE, POC    Collection Time: 03/27/20  3:15 AM   Result Value Ref Range    Glucose (POC) 201 (H) 65 - 100 mg/dL    Performed by Ariana [employer] Code    Collection Time: 03/27/20  3:21 AM   Result Value Ref Range    Glucose 201 mg/dL    Insulin order 4.2 units/hour    Insulin adminstered 4.2 units/hour    Multiplier 0.030     Low target 150 mg/dL    High target 250 mg/dL    D50 order 0.0 ml    D50 administered 0.00 ml    Minutes until next BG 60 min    Order initials jcb     Administered initials jcb     GLSCOM Comments     GLUCOSE, POC    Collection Time: 03/27/20  4:23 AM   Result Value Ref Range    Glucose (POC) 130 (H) 65 - 100 mg/dL    Performed by Nelda Eduardo    Collection Time: 03/27/20  4:26 AM   Result Value Ref Range    Glucose 130 mg/dL    Insulin order 1.4 units/hour    Insulin adminstered 1.4 units/hour    Multiplier 0.020     Low target 150 mg/dL    High target 250 mg/dL    D50 order 0.0 ml    D50 administered 0.00 ml    Minutes until next BG 60 min    Order initials rr     Administered initials rr     GLSCOM Comments     METABOLIC PANEL, BASIC    Collection Time: 03/27/20  4:35 AM   Result Value Ref Range    Sodium 137 136 - 145 mmol/L    Potassium 3.6 3.5 - 5.1 mmol/L    Chloride 108 97 - 108 mmol/L    CO2 21 21 - 32 mmol/L    Anion gap 8 5 - 15 mmol/L    Glucose 110 (H) 65 - 100 mg/dL    BUN 23 (H) 6 - 20 MG/DL    Creatinine 1.25 (H) 0.55 - 1.02 MG/DL    BUN/Creatinine ratio 18 12 - 20      GFR est AA 51 (L) >60 ml/min/1.73m2    GFR est non-AA 42 (L) >60 ml/min/1.73m2    Calcium 8.8 8.5 - 10.1 MG/DL   CBC W/O DIFF    Collection Time: 03/27/20  4:35 AM   Result Value Ref Range    WBC 6.6 3.6 - 11.0 K/uL    RBC 3.01 (L) 3.80 - 5.20 M/uL    HGB 7.4 (L) 11.5 - 16.0 g/dL    HCT 23.8 (L) 35.0 - 47.0 %    MCV 79.1 (L) 80.0 - 99.0 FL    MCH 24.6 (L) 26.0 - 34.0 PG    MCHC 31.1 30.0 - 36.5 g/dL    RDW 18.0 (H) 11.5 - 14.5 %    PLATELET 208 028 - 238 K/uL    MPV 10.6 8.9 - 12.9 FL    NRBC 0.0 0  WBC    ABSOLUTE NRBC 0.00 0.00 - 0.01 K/uL   HEPATIC FUNCTION PANEL    Collection Time: 03/27/20  4:35 AM   Result Value Ref Range    Protein, total 7.6 6.4 - 8.2 g/dL    Albumin 3.0 (L) 3.5 - 5.0 g/dL    Globulin 4.6 (H) 2.0 - 4.0 g/dL    A-G Ratio 0.7 (L) 1.1 - 2.2      Bilirubin, total 1.0 0.2 - 1.0 MG/DL    Bilirubin, direct 0.3 (H) 0.0 - 0.2 MG/DL    Alk.  phosphatase 139 (H) 45 - 117 U/L    AST (SGOT) 60 (H) 15 - 37 U/L    ALT (SGPT) 34 12 - 78 U/L   GLUCOSE, POC    Collection Time: 03/27/20  5:32 AM   Result Value Ref Range    Glucose (POC) 131 (H) 65 - 100 mg/dL    Performed by Ora Tomlin GLUCOSTABILIZER    Collection Time: 03/27/20  5:33 AM   Result Value Ref Range    Glucose 131 mg/dL    Insulin order 0.7 units/hour    Insulin adminstered 0.7 units/hour    Multiplier 0.010     Low target 150 mg/dL    High target 250 mg/dL    D50 order 0.0 ml    D50 administered 0.00 ml    Minutes until next BG 60 min    Order initials rr     Administered initials rr     GLSCOM Comments     GLUCOSE, POC    Collection Time: 03/27/20  6:46 AM   Result Value Ref Range    Glucose (POC) 177 (H) 65 - 100 mg/dL    Performed by Leyla Black    Collection Time: 03/27/20  6:47 AM   Result Value Ref Range    Glucose 177 mg/dL    Insulin order 1.2 units/hour    Insulin adminstered 1.2 units/hour    Multiplier 0.010     Low target 150 mg/dL    High target 250 mg/dL    D50 order 0.0 ml    D50 administered 0.00 ml    Minutes until next BG 60 min    Order initials rr     Administered initials rr     GLSCOM Comments     GLUCOSE, POC    Collection Time: 03/27/20  8:02 AM   Result Value Ref Range    Glucose (POC) 214 (H) 65 - 100 mg/dL    Performed by Leyla Black    Collection Time: 03/27/20  8:03 AM   Result Value Ref Range    Glucose 214 mg/dL    Insulin order 1.5 units/hour    Insulin adminstered 1.5 units/hour    Multiplier 0.010     Low target 150 mg/dL    High target 250 mg/dL    D50 order 0.0 ml    D50 administered 0.00 ml    Minutes until next BG 60 min    Order initials rvj     Administered initials rvj     GLSCOM Comments     METABOLIC PANEL, BASIC    Collection Time: 03/27/20  8:48 AM   Result Value Ref Range    Sodium 135 (L) 136 - 145 mmol/L    Potassium 3.9 3.5 - 5.1 mmol/L    Chloride 105 97 - 108 mmol/L    CO2 21 21 - 32 mmol/L    Anion gap 9 5 - 15 mmol/L    Glucose 210 (H) 65 - 100 mg/dL    BUN 21 (H) 6 - 20 MG/DL    Creatinine 1.11 (H) 0.55 - 1.02 MG/DL    BUN/Creatinine ratio 19 12 - 20      GFR est AA 59 (L) >60 ml/min/1.73m2    GFR est non-AA 48 (L) >60 ml/min/1.73m2 Calcium 8.8 8.5 - 10.1 MG/DL   GLUCOSE, POC    Collection Time: 03/27/20  9:03 AM   Result Value Ref Range    Glucose (POC) 246 (H) 65 - 100 mg/dL    Performed by Reid Coral    Collection Time: 03/27/20  9:04 AM   Result Value Ref Range    Glucose 246 mg/dL    Insulin order 1.9 units/hour    Insulin adminstered 1.9 units/hour    Multiplier 0.010     Low target 150 mg/dL    High target 250 mg/dL    D50 order 0.0 ml    D50 administered 0.00 ml    Minutes until next BG 60 min    Order initials rvj     Administered initials rvj     GLSCOM Comments     OCCULT BLOOD, STOOL    Collection Time: 03/27/20  9:40 AM   Result Value Ref Range    Occult blood, stool POSITIVE (A) NEG     GLUCOSE, POC    Collection Time: 03/27/20 10:18 AM   Result Value Ref Range    Glucose (POC) 251 (H) 65 - 100 mg/dL    Performed by Moro Coral    Collection Time: 03/27/20 10:22 AM   Result Value Ref Range    Glucose 251 mg/dL    Insulin order 3.8 units/hour    Insulin adminstered 3.8 units/hour    Multiplier 0.020     Low target 150 mg/dL    High target 250 mg/dL    D50 order 0.0 ml    D50 administered 0.00 ml    Minutes until next BG 60 min    Order initials rvj     Administered initials rvj     GLSCOM Comments pt         Blood glucose pattern      Assessment and Plan   Nursing Diagnosis Risk for unstable blood glucose pattern   Nursing Intervention Domain 9262 Decision-making Support   Nursing Interventions Examined current inpatient diabetes control   Explored factors facilitating and impeding inpatient management  Identified self-management practices impeding diabetes control       Evaluation   Insulin drip has stabilized blood glucose levels in the past 24 hours. Patient has been given first dose of basal insulin (20 units Lantus) this morning at 0800. Bolus insulin dosing is indicated as patient is eating meals and is ordered as 1 unit per 15 grams CHO.      Correction dosing is not ordered at this time due to insulin drip still infusing at this time. Blood glucose management has been impacted by  O Change in kidney function  O Erratic meal consumption    Recommendations   Basal insulin  O Recommend continuing Lantus 20 units (0.3 unit/kg/D is 21 units) daily. Bolus insulin  O Normal sensitivity - Recommend changing bolus insulin to 4 units Humalog with each meal    Corrective insulin  O Normal sensitivity - Recommend the following corrective insulin:     Correctional Scale for Normal Sensitivity     200-249- 2units Humalog   [id number] Humalog   [phone] Humalog   [phone] Humalog   Over 400- 10units Humalog     Do NOT hold for NPO; give in addition to meal time insulin dose. If patient does not eat, -give correction dose only. Referrals     [x]?         Diabetes Self-Management Training through [facility] (Phone [phone] to schedule appointment)    Billing Code(s)   [facility]  Access via [facility]

## 2020-03-27 NOTE — PROGRESS NOTES
4157  Report received. Pt lying in bed daughter at bedside. Pt denies any pain at this time call bell within reach    0750  Spoke with Dr Tosin Delarosa new order received give pt Lantus now and discontinue insulin drip in 2 hours     0812  Pt given 20 lantus subq .  Pt didn't eat breakfast held Carb coverage insulin     0940  Pt had small formed bowel movement occult blood sent to lab     1018  Pt's insulin drip discontinue     1142  Pt's blood sugar  244 given 3 units Humalog     1300  Pt taken to MRI     1352  Pt returned from MRI     1410  Pt off unit     1458  Pt back on unit     1500  Report given to April Hertlein to include SBAR

## 2020-03-27 NOTE — PHYSICIAN ADVISORY
Letter of Status Determination: Current Status INPATIENT is Appropriate Pt Name:  Lisa Omer MR#  [id number] [facility]#   [id number] Room and Hospital  321/01  @ [address] [place] [address] Hospitalization date  3/26/2020 10:23 AM  
Current Attending Physician  Kay Saravia MD  
Principal diagnosis  DKA Clinicals Ms. Rodriguez is a 65 y.o.  female who is admitted with DKA. Ms. Ronna Gonzalezence his past medical history of diabetes mellitus, anxiety, depression, hypertension. GERD presented to ER complaining of confusion, high blood sugar and poor p.o. intake which started 2 days ago. Patient stated that she has not been eating enough for the last 5 days, but if she eats, she eats sugary food. Has been very weak and recently started to be confused intermittently. Patient with DKA, Hb trending down, positive hemocccult [place] MCG criteria Does  NOT apply STATUS DETERMINATION  On the basis of clinical data, available documentaion, we believe that the current status of this patient as INPATIENT is Appropriate The final decision of the patient's hospitalization status depends on the attending physician's judgment Additional comments Insurance  Payor: Eliazar Beyer / Plan: [address] [place] [address] [employer] H[employer] / Product Type: Managed Care Medicare / Insurance Information [address] [place] [address] Phone: [phone] SubscriberAosman Greer Subscriber#: [id number] Group#: [id number] Precert#:   
  
 
  
 
 
 
 
Delilah Lopez MD 
Cell: [phone] Physician Advisor

## 2020-03-27 NOTE — PROGRESS NOTES
Sandeep Powers [facility]  [address] [facility], [address] [place] [address]  [phone]      Medical Progress Note      NAME: Bessy Zapien   :  1950  MRM:  [id number]    Date of service: 3/27/2020  7:03 AM       Assessment and Plan:   1. DKA (diabetic ketoacidoses) ([facility] (3/26/2020). This is most likely due to noncompliance with her diet and insulin in take. On insulin drip. Check frequent labs, IV fluids (now On D5 half-normal saline). A1c 10. 8. appreciated diabetic treatment specialist evaluation.     2.   pseudohyponatremia (3/26/20)(POA). Now better. corrected sodium for blood sugar is 137 on admission. Continue IV fluids monitor.     3. KELSIE (acute kidney injury) ([facility] (3/26/2020)/dehydration. Likely secondary to IVVD. improving. Avoid nephrotoxic drugs     4. Anxiety (3/26/2020)/ Depression (3/26/2020). Continue home zoloft and seroquel    5. HTN (hypertension) (3/26/2020). Continue home amlodipine, lisinopril.      6. GERD (gastroesophageal reflux disease) (3/26/2020). Continue PPI     7. Microcytic anemia (3/26/2020). Check  stool for occult blood. Pt stated that she had colonoscopy in the past. Needs outpatient FU and further workup.      8.  Metabolic encephalopathy, mild. Likely due to DKA/ dehydration. Improved.        addendum:  Pt's daughter is concerned about dysarthria and RT side weakness( according to pt, the weakness is old). Will check MRI of the brain            Subjective:     Chief Complaint[de-identified] Patient was seen and examined as a follow up for DKA. Chart was reviewed. ROS:  (bold if positive, if negative)    Tolerating PT  Tolerating Diet        Objective:     Last 24hrs VS reviewed since prior progress note.  Most recent are:    Visit Vitals  /53 (BP 1 Location: Right arm, BP Patient Position: At rest)   Pulse 95   Temp 98.7 °F (37.1 °C)   Resp 17   Ht 5' 2\" (1.575 m)   Wt 70.3 kg (155 lb)   SpO2 96%   BMI 28.35 kg/m²     SpO2 Readings from Last 6 Encounters:   03/27/20 96%   02/15/20 95%   02/10/20 97%   12/20/19 94%   12/07/19 100%   12/04/19 95%            Intake/Output Summary (Last 24 hours) at 3/27/2020 0703  Last data filed at 3/27/2020 0038  Gross per 24 hour   Intake 786.23 ml   Output 800 ml   Net -13.77 ml        Physical Exam:    Gen:  Well-developed, well-nourished, in no acute distress  HEENT:  Pink conjunctivae, PERRL, hearing intact to voice, moist mucous membranes  Neck:  Supple, without masses, thyroid non-tender  Resp:  No accessory muscle use, clear breath sounds without wheezes rales or rhonchi  Card:  No murmurs, normal S1, S2 without thrills, bruits or peripheral edema  Abd:  Soft, non-tender, non-distended, normoactive bowel sounds are present, no palpable organomegaly and no detectable hernias  Lymph:  No cervical or inguinal adenopathy  Musc:  No cyanosis or clubbing  Skin:  No rashes or ulcers, skin turgor is good  Neuro:  Cranial nerves are grossly intact, no focal motor weakness, follows commands appropriately  Psych:  Good insight, oriented to person, place and time, alert  __________________________________________________________________  Medications Reviewed: (see below)  Medications:     Current Facility-Administered Medications   Medication Dose Route Frequency    QUEtiapine (SEROquel) tablet 50 mg  50 mg Oral QHS    ondansetron (ZOFRAN ODT) tablet 4 mg  4 mg Oral Q8H PRN    sertraline (ZOLOFT) tablet 50 mg  50 mg Oral DAILY    insulin regular (NOVOLIN R, HUMULIN R) 100 Units in 0.9% sodium chloride 100 mL infusion  0-50 Units/hr IntraVENous TITRATE    insulin lispro (HUMALOG) injection   SubCUTAneous TIDAC    glucose chewable tablet 16 g  4 Tab Oral PRN    glucagon (GLUCAGEN) injection 1 mg  1 mg IntraMUSCular PRN    dextrose 10% infusion 0-250 mL  0-250 mL IntraVENous PRN    sodium chloride (NS) flush 5-40 mL  5-40 mL IntraVENous Q8H    sodium chloride (NS) flush 5-40 mL  5-40 mL IntraVENous PRN    heparin (porcine) injection 5,000 Units  5,000 Units SubCUTAneous Q8H    dextrose 5 % - 0.45% NaCl infusion  125 mL/hr IntraVENous CONTINUOUS        Lab Data Reviewed: (see below)  Lab Review:     Recent Labs     03/27/20  0435 03/26/20  1114   WBC 6.6 7.1   HGB 7.4* 8.6*   HCT 23.8* 27.6*    224     Recent Labs     03/27/20  0435 03/26/20  2243 03/26/20  1114    135* 127*   K 3.6 4.3 5.2*    105 96*   CO2 21 20* 17*   * 212* 533*   BUN 23* 28* 36*   CREA 1.25* 1.19* 1.75*   CA 8.8 9.4 9.7   MG  --   --  2.0   PHOS  --  2.8  --    ALB 3.0*  --  3.6   TBILI 1.0  --  1.2*   SGOT 60*  --  57*   ALT 34  --  40     Lab Results   Component Value Date/Time    Glucose (POC) 177 (H) 03/27/2020 06:46 AM    Glucose (POC) 131 (H) 03/27/2020 05:32 AM    Glucose (POC) 130 (H) 03/27/2020 04:23 AM    Glucose (POC) 201 (H) 03/27/2020 03:15 AM    Glucose (POC) 265 (H) 03/27/2020 02:19 AM     No results for input(s): PH, PCO2, PO2, HCO3, FIO2 in the last 72 hours. No results for input(s): INR, INREXT in the last 72 hours. All Micro Results     Procedure Component Value Units Date/Time    CULTURE, URINE [[id number]] Collected:  03/27/20 0020    Order Status:  Completed Updated:  03/27/20 0059    URINE CULTURE HOLD SAMPLE [[id number]] Collected:  03/26/20 1323    Order Status:  Completed Specimen:  Urine from Serum Updated:  03/26/20 1328     Urine culture hold       Urine on hold in Microbiology dept for 2 days. If unpreserved urine is submitted, it cannot be used for addtional testing after 24 hours, recollection will be required. I have reviewed notes of prior 24hr. Other pertinent lab:      Total time spent with patient: [address] discussed with: Patient, Nursing Staff and >50% of time spent in counseling and coordination of care    Discussed:  Care Plan    Prophylaxis:  Hep SQ    Disposition:  Home w/Family           ___________________________________________________    Attending Physician: Pastora Isaac MD

## 2020-03-27 NOTE — DISCHARGE INSTRUCTIONS
ACUTE DIAGNOSES:  DKA (diabetic ketoacidoses) ([facility] [E11.10]  DKA (diabetic ketoacidoses) ([place] [address].) [E11.10]    CHRONIC MEDICAL DIAGNOSES:  Problem List as of 3/27/2020 Date Reviewed: 3/26/2020          Codes Class Noted - Resolved    Hyponatremia ICD-10-CM: E87.1  ICD-9-CM: 276.1  3/26/2020 - Present        KELSIE (acute kidney injury) ([facility] ICD-10-CM: N17.9  ICD-9-CM: 584.9  3/26/2020 - Present        Anxiety ICD-10-CM: F41.9  ICD-9-CM: 300.00  3/26/2020 - Present        Depression ICD-10-CM: F32.9  ICD-9-CM: 226  3/26/2020 - Present        HTN (hypertension) ICD-10-CM: I10  ICD-9-CM: 401.9  3/26/2020 - Present        GERD (gastroesophageal reflux disease) ICD-10-CM: K21.9  ICD-9-CM: 530.81  3/26/2020 - Present        Microcytic anemia ICD-10-CM: D50.9  ICD-9-CM: 280.9  3/26/2020 - Present        RESOLVED: DKA (diabetic ketoacidoses) ([facility] ICD-10-CM: E11.10  ICD-9-CM: 250.10  3/26/2020 - 3/27/2020              DISCHARGE MEDICATIONS:         · It is important that you take the medication exactly as they are prescribed. · Keep your medication in the bottles provided by the pharmacist and keep a list of the medication names, dosages, and times to be taken in your wallet. · Do not take other medications without consulting your doctor. DIET:  Diabetic Diet    ACTIVITY: Activity as tolerated    ADDITIONAL INFORMATION: If you experience any of the following symptoms then please call your primary care physician or return to the emergency room if you cannot get hold of your doctor: Fever, chills, nausea, vomiting, diarrhea, change in mentation, falling, bleeding, shortness of breath. FOLLOW UP CARE:  Dr. Carol Rosales MD  you are to call and set up an appointment to see them in 2 weeks. Information obtained by :  I understand that if any problems occur once I am at home I am to contact my physician. I understand and acknowledge receipt of the instructions indicated above. Physician's or R.N.'s Signature                                                                  Date/Time                                                                                                                                              Patient or Representative Signature                                                          Date/Time

## 2020-03-28 ENCOUNTER — PATIENT OUTREACH (OUTPATIENT)
Dept: CARDIOLOGY CLINIC | Age: 70
End: 2020-03-28

## 2020-03-28 ENCOUNTER — HOME HEALTH ADMISSION (OUTPATIENT)
Dept: HOME HEALTH SERVICES | Facility: HOME HEALTH | Age: 70
End: 2020-03-28
Payer: MEDICARE

## 2020-03-28 LAB
BACTERIA SPEC CULT: NORMAL
LEFT CCA DIST DIAS: 21.3 CM/S
LEFT CCA DIST SYS: 115 CM/S
LEFT CCA PROX DIAS: 27.8 CM/S
LEFT CCA PROX SYS: 131.2 CM/S
LEFT ECA DIAS: 13.01 CM/S
LEFT ECA SYS: 82 CM/S
LEFT ICA MID DIAS: 15.3 CM/S
LEFT ICA MID SYS: 77.5 CM/S
LEFT ICA PROX DIAS: 15.7 CM/S
LEFT ICA PROX SYS: 89.4 CM/S
LEFT ICA/CCA SYS: 0.78
LEFT SUBCLAVIAN DIAS: 17.87 CM/S
LEFT SUBCLAVIAN SYS: 231.5 CM/S
LEFT VERTEBRAL DIAS: 20.9 CM/S
LEFT VERTEBRAL SYS: 139.2 CM/S
RIGHT CCA DIST DIAS: 15.6 CM/S
RIGHT CCA DIST SYS: 113 CM/S
RIGHT CCA PROX DIAS: 0 CM/S
RIGHT CCA PROX SYS: 60.2 CM/S
RIGHT ECA DIAS: 10.02 CM/S
RIGHT ECA SYS: 101.9 CM/S
RIGHT ICA DIST DIAS: 24.5 CM/S
RIGHT ICA DIST SYS: 92.8 CM/S
RIGHT ICA MID DIAS: 28.8 CM/S
RIGHT ICA MID SYS: 101.7 CM/S
RIGHT ICA PROX DIAS: 20.9 CM/S
RIGHT ICA PROX SYS: 74.1 CM/S
RIGHT ICA/CCA SYS: 0.9
RIGHT SUBCLAVIAN DIAS: 0 CM/S
RIGHT SUBCLAVIAN SYS: 171.9 CM/S
RIGHT VERTEBRAL DIAS: 19.97 CM/S
RIGHT VERTEBRAL SYS: 82.7 CM/S
SERVICE CMNT-IMP: NORMAL

## 2020-03-28 NOTE — PROGRESS NOTES
COVID-19 Screening Initial Follow-up Note    Patient contacted regarding COVID-19  risk. Care Transition Nurse/ Ambulatory Care Manager contacted the daughter, Onetha Handing by telephone to perform post discharge assessment. Verified name and  with daughter as identifiers. Provided introduction to self, and explanation of the CTN/ACM role, and reason for call due to risk factors for infection and/or exposure to COVID-19. Symptoms reviewed with daughter who verbalized the following symptoms: no new/worsening symptoms     CTN contact in-pt CM at hospital- she will try to get [place] order done today- daughter was not familiar with diabetes management-care. Has no past experience with any [place] agency. Mother has only been living with her for past 7 months. CTN made referral to  to assist with securing education and resources- including establishing with providers in [facility] for care. IE endo. Recent neuro-psych testing done but not yet met with provider to review results and determine plan for: cognitive decline post CVA's- daughter reports mother is resistant to supervision and has decreased memory, attentativeness to personal hygiene, etc.        Due to no new or worsening symptoms encounter was not routed to provider for escalation. Patient has following risk factors of: CAD-HTN, DM2-DKA. CTN/ACM reviewed discharge instructions, medical action plan and red flags such as increased shortness of breath, increasing fever and signs of decompensation with daughter who verbalized understanding. Discussed exposure protocols and quarantine with CDC Guidelines What to do if you are sick with coronavirus disease 2019 Daughter who was given an opportunity for questions and concerns. The daughter agrees to contact the [employer] exposure line [phone], [facility] department [address]  [phone] and PCP office for questions related to their healthcare.  CTN/ACM provided contact information for future reference.     Reviewed and educated daughter on any new and changed medications related to discharge diagnosis     Plan for follow-up call in 14 days based on severity of symptoms and risk factors

## 2020-03-28 NOTE — PROGRESS NOTES
3/28/2020  11:51 AM  CM received TC from Ana POSEY, pt discharged 3/27/20 no home health had been arranged. Pt is requiring HH for DM disease management, PT as pt has had fall and history of stroke. CM s/w pt's Dtr Nanette Bravo [phone], [facility], she agrees, referral sen to [employer] in [facility] has accepted pt for [facility], they will contact pt's Dtr  Care Management Interventions  PCP Verified by CM: Yes(Dr Moni Gipson)  Last Visit to PCP: 02/27/20  Transition of Care Consult (CM Consult): [address]: Yes  Current Support Network:  Other(Has lived with daughter, Nanette Bravo, since October 2019)  Confirm Follow Up Transport: Family  The Plan for Transition of Care is Related to the Following Treatment Goals : Home Health  The Patient and/or Patient Representative was Provided with a Choice of Provider and Agrees with the Discharge Plan?: Yes  Name of the Patient Representative Who was Provided with a Choice of Provider and Agrees with the Discharge Plan: Nanette Bravo Dtr   Freedom of Choice List was Provided with Basic Dialogue that Supports the Patient's Individualized Plan of Care/Goals, Treatment Preferences and Shares the Quality Data Associated with the Providers?: (S) Yes  Discharge Location  Discharge Placement: Home with home health  Aad Sheridan

## 2020-03-30 ENCOUNTER — HOME CARE VISIT (OUTPATIENT)
Dept: SCHEDULING | Facility: HOME HEALTH | Age: 70
End: 2020-03-30
Payer: MEDICARE

## 2020-03-30 PROCEDURE — 400013 HH SOC

## 2020-03-30 PROCEDURE — G0299 HHS/HOSPICE OF RN EA 15 MIN: HCPCS

## 2020-03-30 RX ORDER — SERTRALINE HYDROCHLORIDE 50 MG/1
TABLET, FILM COATED ORAL
Qty: 90 TAB | Refills: 3 | Status: SHIPPED | OUTPATIENT
Start: 2020-03-30 | End: 2021-02-26 | Stop reason: SDUPTHER

## 2020-03-31 ENCOUNTER — PATIENT OUTREACH (OUTPATIENT)
Dept: INTERNAL MEDICINE CLINIC | Age: 70
End: 2020-03-31

## 2020-03-31 ENCOUNTER — TELEPHONE (OUTPATIENT)
Dept: NEUROLOGY | Age: 70
End: 2020-03-31

## 2020-03-31 VITALS
TEMPERATURE: 99 F | DIASTOLIC BLOOD PRESSURE: 68 MMHG | HEART RATE: 70 BPM | OXYGEN SATURATION: 99 % | RESPIRATION RATE: 16 BRPM | SYSTOLIC BLOOD PRESSURE: 122 MMHG

## 2020-03-31 NOTE — TELEPHONE ENCOUNTER
----- Message from Laz Amaral sent at 3/31/2020  3:26 PM EDT -----  Regarding: Dr. Haylee Abrams  General Message/Vendor Calls    Caller's first and last name: Anthony Lux- daughter      Reason for call: Schedule VV for follow up appt      Callback required yes/no and why: yes      Best contact number(s): [phone]      Details to clarify the request:      Laz Amaral

## 2020-03-31 NOTE — PROGRESS NOTES
Social Work Note  3/31/2020      Referral received from Clark Marin RN for follow post hospital discharge. Chart reviewed. Contacted patient's daughter, Kala Freeman ([phone]) for assessment. Topics addressed:     Home health:   Home health services have [facility] through [facility]. Ms. Nany Seals advised that nursing has been working with her and she has learned how and when to check blood sugars. She stated that she has also been learning about a diabetic diet. Per daughter, skilled nursing will be coming 3x/week for the first two weeks. Ms. Nany Seals also advised that PT begins tomorrow. Medications:  Ms. Nany Seals is administering all patient medications including insulin. Follow up appointments:  Ms. Nany Seals stated that the physician at the hospital recommended a follow up with PCP and also an endocrinologist.  [address] to contact PCP office to schedule appropriate follow up. Advised that many offices are providing video or telephonic visits. Also asked Ms. Nany Seals to talk with PCP re: referral to endocrinologist and their recommendations. Advised that new patient appointments may not be scheduled for a while due to COVID-19. Ms. Nany Seals also mentioned that patient missed follow appointment with neuropsychologist due to hospitalization. Encouraged her to contact the practitioner's office to reschedule. Legal information:  Spoke with Ms. Nany Seals about whether patient has durable or medical Power of . Ms. Nany Seals stated that when her mother had a stroke in 2018, she and her sister took care of legal paperwork. Patient's daughter, Ms. Eda Cast is patient's primary durable (financial) power of  and Ms. Nany Seals is the secondary. Ms. Nany Seals also stated that her mother did complete an advanced directive. Encouraged Ms. Nany Seals to obtain copies of records for her us if needed.    Also advised that if she can bring information to PCP office, then it can be scanned into patient's chart. Adjustment to caregiving:  Spoke with Ms. J Carlos Burton about caregiving concerns, needs, and adjustment to caring for a parent. She voiced that the diabetes education and taking care of her mother as it relates to her memory loss is very new. Advised that she is not alone in caregiving and that there are many supportive resources available. Discussed daily challenges such as bathing and provided resources. Also advised that there are additional options for continued diabetes education in the future if needed. Advised that pharmacists associated with practices also provide some diabetic education. Encouraged Ms. J Carlos Burton to reach out to PCP office or SW if needed. Caregiving resources provided:  · Alzheimer's Association / www.alz.org/ helpline [phone]: Advised Ms. J Carlos Burton that the Alzheimer's Association provides education and assistance to individuals and families experiencing memory loss, diagnosed or undiagnosed. Advised of support groups and online resources such as tips for bathing, dressing, or dealing with challenging behaviors. · [employer] Connections/[facility] Agency on Aging ([phone]): Advised Ms. J Carlos Burton that this organization can provide connection to resources for seniors including insurance counseling. · Care options for future if needed:  Spoke with daughter about care options for her mother when Ms. J Carlos Burton returned to work. Options discussed - Adult Day Care ($78-80/day) and personal care in the home ($25/hour)    SW provided name and number for additional resources and support as needed. SW to follow up in one week for additional assessment.      Francisco Baig, ALMAZ, ACSW, CCM    [facility] Management Team  [phone]

## 2020-03-31 NOTE — TELEPHONE ENCOUNTER
----- Message from Tio Janis sent at 3/30/2020  4:28 PM EDT -----  Regarding: Dr Grimm/telephone   Patient return call    Caller's first and last name and relationship (if not the patient):  Herber hackett,pts daughter    Best contact number(s):[phone]      Whose call is being returned: Dr Jeannie Chen      Details to clarify the request:regarding an appt for her mother       Tio Bruce

## 2020-04-01 ENCOUNTER — HOME CARE VISIT (OUTPATIENT)
Dept: SCHEDULING | Facility: HOME HEALTH | Age: 70
End: 2020-04-01
Payer: MEDICARE

## 2020-04-01 ENCOUNTER — HOME CARE VISIT (OUTPATIENT)
Dept: HOME HEALTH SERVICES | Facility: HOME HEALTH | Age: 70
End: 2020-04-01
Payer: MEDICARE

## 2020-04-01 PROCEDURE — G0151 HHCP-SERV OF PT,EA 15 MIN: HCPCS

## 2020-04-02 ENCOUNTER — HOME CARE VISIT (OUTPATIENT)
Dept: SCHEDULING | Facility: HOME HEALTH | Age: 70
End: 2020-04-02
Payer: MEDICARE

## 2020-04-02 VITALS
RESPIRATION RATE: 14 BRPM | HEART RATE: 87 BPM | DIASTOLIC BLOOD PRESSURE: 60 MMHG | OXYGEN SATURATION: 99 % | SYSTOLIC BLOOD PRESSURE: 122 MMHG | TEMPERATURE: 97.6 F

## 2020-04-02 PROCEDURE — G0299 HHS/HOSPICE OF RN EA 15 MIN: HCPCS

## 2020-04-03 ENCOUNTER — HOME CARE VISIT (OUTPATIENT)
Dept: SCHEDULING | Facility: HOME HEALTH | Age: 70
End: 2020-04-03
Payer: MEDICARE

## 2020-04-03 ENCOUNTER — HOME CARE VISIT (OUTPATIENT)
Dept: HOME HEALTH SERVICES | Facility: HOME HEALTH | Age: 70
End: 2020-04-03
Payer: MEDICARE

## 2020-04-03 VITALS
OXYGEN SATURATION: 99 % | RESPIRATION RATE: 15 BRPM | TEMPERATURE: 98.2 F | DIASTOLIC BLOOD PRESSURE: 60 MMHG | SYSTOLIC BLOOD PRESSURE: 126 MMHG | HEART RATE: 93 BPM

## 2020-04-03 PROCEDURE — G0151 HHCP-SERV OF PT,EA 15 MIN: HCPCS

## 2020-04-04 VITALS
OXYGEN SATURATION: 98 % | SYSTOLIC BLOOD PRESSURE: 138 MMHG | TEMPERATURE: 98.3 F | RESPIRATION RATE: 14 BRPM | HEART RATE: 93 BPM | DIASTOLIC BLOOD PRESSURE: 82 MMHG

## 2020-04-06 ENCOUNTER — HOME CARE VISIT (OUTPATIENT)
Dept: HOME HEALTH SERVICES | Facility: HOME HEALTH | Age: 70
End: 2020-04-06
Payer: MEDICARE

## 2020-04-06 ENCOUNTER — VIRTUAL VISIT (OUTPATIENT)
Dept: NEUROLOGY | Age: 70
End: 2020-04-06

## 2020-04-06 ENCOUNTER — HOME CARE VISIT (OUTPATIENT)
Dept: SCHEDULING | Facility: HOME HEALTH | Age: 70
End: 2020-04-06
Payer: MEDICARE

## 2020-04-06 VITALS
SYSTOLIC BLOOD PRESSURE: 132 MMHG | HEART RATE: 109 BPM | OXYGEN SATURATION: 98 % | TEMPERATURE: 98.6 F | DIASTOLIC BLOOD PRESSURE: 60 MMHG | RESPIRATION RATE: 12 BRPM

## 2020-04-06 DIAGNOSIS — F03.90 MAJOR NEUROCOGNITIVE DISORDER DUE TO ALZHEIMER'S DISEASE, PROBABLE, WITHOUT BEHAVIORAL DISTURBANCE: Primary | ICD-10-CM

## 2020-04-06 DIAGNOSIS — F41.9 ANXIETY AND DEPRESSION: ICD-10-CM

## 2020-04-06 DIAGNOSIS — F32.A ANXIETY AND DEPRESSION: ICD-10-CM

## 2020-04-06 DIAGNOSIS — R44.3 HALLUCINATIONS: ICD-10-CM

## 2020-04-06 PROCEDURE — G0151 HHCP-SERV OF PT,EA 15 MIN: HCPCS

## 2020-04-06 NOTE — PROGRESS NOTES
This note will not be viewable in [address] Pursuant to the emergency declaration under the [facility] [facility] waiver authority and the [employer] Act, this Virtual Visit was conducted, with appropriate consent obtained, to reduce the patient's risk of exposure to COVID-19 and provide continuity of care   Services were provided in this manner to substitute for in-person clinic visit. The originating site is the patient's home and the distance site is [facility]. These types of teleneuropsychology/telehealth/telemedicine visits were authorized by the President of the [employer], though I/we cannot guarantee what a third party payor will do reimbursement/coverage wise. I indicated that I would evaluate the patient and recommend diagnostics and treatment based on my assessment and impressions, and that our sessions are not being recorded and that personal health information is protected to the best of our abilities. Nestor Castorena is a 65 y.o. female who presents today for feedback following recent neuropsychological testing. The results were reviewed of the recent neuropsychological evaluation, including discussing individual tests as well as the patient's areas of neurocognitive strength versus their weaknesses. Education was provided regarding my diagnostic impressions, and we discussed next steps for further evaluation down the road. I all also answered numerous questions related to the clinical findings, including discussing various methods to improve cognitive cognition and mood when relevant. The patient is encouraged to follow-up with the referring provider. DIAGNOSTIC IMPRESSIONS:    ICD-10-CM ICD-9-CM    1. Major neurocognitive disorder due to Alzheimer's disease, probable, without behavioral disturbance ([facility] G30.9 331.0     F02.80 294.10    2. Anxiety and depression F41.9 300.00     F32.9 311    3.  Hallucinations R44.3 780.1        21824 30 minutes x 1    Jason Jacinto, PHD

## 2020-04-07 ENCOUNTER — HOME CARE VISIT (OUTPATIENT)
Dept: SCHEDULING | Facility: HOME HEALTH | Age: 70
End: 2020-04-07
Payer: MEDICARE

## 2020-04-07 ENCOUNTER — HOME CARE VISIT (OUTPATIENT)
Dept: HOME HEALTH SERVICES | Facility: HOME HEALTH | Age: 70
End: 2020-04-07
Payer: MEDICARE

## 2020-04-07 VITALS
HEART RATE: 95 BPM | OXYGEN SATURATION: 97 % | SYSTOLIC BLOOD PRESSURE: 108 MMHG | DIASTOLIC BLOOD PRESSURE: 62 MMHG | TEMPERATURE: 98.2 F | RESPIRATION RATE: 20 BRPM

## 2020-04-07 PROCEDURE — G0300 HHS/HOSPICE OF LPN EA 15 MIN: HCPCS

## 2020-04-08 ENCOUNTER — HOME CARE VISIT (OUTPATIENT)
Dept: SCHEDULING | Facility: HOME HEALTH | Age: 70
End: 2020-04-08
Payer: MEDICARE

## 2020-04-08 ENCOUNTER — HOME CARE VISIT (OUTPATIENT)
Dept: HOME HEALTH SERVICES | Facility: HOME HEALTH | Age: 70
End: 2020-04-08
Payer: MEDICARE

## 2020-04-08 VITALS
SYSTOLIC BLOOD PRESSURE: 142 MMHG | HEART RATE: 106 BPM | DIASTOLIC BLOOD PRESSURE: 60 MMHG | RESPIRATION RATE: 14 BRPM | OXYGEN SATURATION: 99 % | TEMPERATURE: 97.9 F

## 2020-04-08 PROCEDURE — G0151 HHCP-SERV OF PT,EA 15 MIN: HCPCS

## 2020-04-10 ENCOUNTER — HOME CARE VISIT (OUTPATIENT)
Dept: SCHEDULING | Facility: HOME HEALTH | Age: 70
End: 2020-04-10
Payer: MEDICARE

## 2020-04-10 ENCOUNTER — VIRTUAL VISIT (OUTPATIENT)
Dept: INTERNAL MEDICINE CLINIC | Age: 70
End: 2020-04-10

## 2020-04-10 ENCOUNTER — HOME CARE VISIT (OUTPATIENT)
Dept: HOME HEALTH SERVICES | Facility: HOME HEALTH | Age: 70
End: 2020-04-10
Payer: MEDICARE

## 2020-04-10 VITALS
TEMPERATURE: 97.8 F | HEART RATE: 83 BPM | SYSTOLIC BLOOD PRESSURE: 120 MMHG | RESPIRATION RATE: 20 BRPM | DIASTOLIC BLOOD PRESSURE: 82 MMHG | OXYGEN SATURATION: 98 %

## 2020-04-10 DIAGNOSIS — N17.9 AKI (ACUTE KIDNEY INJURY) (HCC): ICD-10-CM

## 2020-04-10 DIAGNOSIS — I10 ESSENTIAL HYPERTENSION: Primary | ICD-10-CM

## 2020-04-10 DIAGNOSIS — D50.9 MICROCYTIC ANEMIA: ICD-10-CM

## 2020-04-10 DIAGNOSIS — E87.1 HYPONATREMIA: ICD-10-CM

## 2020-04-10 PROCEDURE — G0300 HHS/HOSPICE OF LPN EA 15 MIN: HCPCS

## 2020-04-10 NOTE — PROGRESS NOTES
Consent: Jero Peterson, who was seen by synchronous (real-time) audio-video technology, and/or her healthcare decision maker, is aware that this patient-initiated, Telehealth encounter on 4/10/2020 is a billable service, with coverage as determined by her insurance carrier. She is aware that she may receive a bill and has provided verbal consent to proceed: Yes. Assessment & Plan:   Diagnoses and all orders for this visit:    1. Essential hypertension    2. KELSIE (acute kidney injury) ([address]  -     METABOLIC PANEL, BASIC    3. Hyponatremia  -     METABOLIC PANEL, BASIC    4. Microcytic anemia  -     CBC WITH AUTOMATED DIFF    5. Type II diabetes mellitus with complication, uncontrolled (HCC)  -     HEMOGLOBIN A1C W/O EAG  -     REFERRAL TO ENDOCRINOLOGY                  712  Subjective:   Jero Peterson is a 65 y.o. female who was seen for No chief complaint on file. Type 2 diabetes:  Patient admitted to [facility] 3/26 to 3/27  Went in for diabetic ketoacidosis   Now taking Levemir 40 units nightly  Per patient and her daughter she is doing much better  Morning blood sugars have been in the 100's! She has freestyle curry  A1c will be due again June 26,2020    pseudoHyponatremia noted during hospitalization- improved    ARF- noted during hospitalization- improved    Anemia- noted during hospitalization  Will need a repeat CBC and GI consult    Depression taking zoloft    Hypertension- she states she is compliant with her medications. She will check bp today     Dr. Fredi Soliman (neuropsych) has dx patient with Alzheimers  Family has reached out to Dr. Susana Henriquez (neurologist) regarding medications  Taking seroquel bid          She does have [facility] currently      Prior to Admission medications    Medication Sig Start Date End Date Taking? Authorizing Provider   amLODIPine (NORVASC) 10 mg tablet Take 10 mg by mouth daily.  3/30/20   Provider, Historical   sertraline (ZOLOFT) 50 mg tablet TAKE 1/2 TABLET IN THE MORNING AND 1 TABLET AT BEDTIME 3/30/20   Toy Fleischer, MD   sertraline (ZOLOFT) 50 mg tablet Take 50 mg by mouth nightly. Sertraline 1/2 tablet in the morning and 1 tablet at bedtime. Provider, Historical   cefUROXime (CEFTIN) 500 mg tablet Take 0.5 Tabs by mouth two (2) times a day. 3/27/20   Fiona Sharp MD   insulin detemir U-100 (LEVEMIR FLEXTOUCH) 100 unit/mL (3 mL) inpn 40 Units by SubCUTAneous route nightly. 3/27/20   Fiona Sharp MD   acetaminophen (TYLENOL) 500 mg tablet Take 1,500 mg by mouth two (2) times daily as needed for Pain. Provider, Historical   clopidogreL (PLAVIX) 75 mg tab TAKE 1 TABLET BY MOUTH DAILY 2/25/20   Toy Fleischer, MD   lisinopril (PRINIVIL, ZESTRIL) 30 mg tablet Take 1 Tab by mouth daily. 2/25/20   Toy Fleischer, MD   pantoprazole (PROTONIX) 40 mg tablet Take 1 Tab by mouth daily. 2/25/20   Toy Fleischer, MD   amLODIPine (NORVASC) 10 mg tablet amlodipine 10 mg tablet  Patient not taking: Reported on 4/7/2020 1/28/20   Toy Fleischer, MD   QUEtiapine (SEROQUEL) 50 mg tablet Take 1 Tab by mouth two (2) times a day. 1/2/20   ByronSonal MD     Allergies   Allergen Reactions    Morphine Itching     Per patient's daughter, severe itching after Morphine IV. Review of Systems   Constitutional: Negative for weight loss. Eyes: Negative for blurred vision. Respiratory: Negative for shortness of breath. Cardiovascular: Negative for chest pain and leg swelling. Genitourinary: Negative for frequency and urgency. Musculoskeletal: Negative for joint pain. Neurological: Negative for headaches. Objective:   Vital Signs: (As obtained by patient/caregiver at home)  There were no vitals taken for this visit.      [INSTRUCTIONS:  \"[x]\" Indicates a positive item  \"[]\" Indicates a negative item  -- DELETE ALL ITEMS NOT EXAMINED]    Constitutional: [x] Appears well-developed and well-nourished [x] No apparent distress      [] Abnormal -     Mental status: [x] Alert and awake  [x] Oriented to person/place/time [x] Able to follow commands    [] Abnormal -     Eyes:   EOM    [x]  Normal    [] Abnormal -   Sclera  [x]  Normal    [] Abnormal -          Discharge [x]  None visible   [] Abnormal -     HENT: [x] Normocephalic, atraumatic  [] Abnormal -   [x] Mouth/Throat: Mucous membranes are moist    External Ears [x] Normal  [] Abnormal -    Neck: [x] No visualized mass [] Abnormal -     Pulmonary/Chest: [x] Respiratory effort normal   [x] No visualized signs of difficulty breathing or respiratory distress        [] Abnormal -      Musculoskeletal:   [x] Normal gait with no signs of ataxia         [x] Normal range of motion of neck        [] Abnormal -     Neurological:        [x] No Facial Asymmetry (Cranial nerve 7 motor function) (limited exam due to video visit)          [x] No gaze palsy        [] Abnormal -          Skin:        [x] No significant exanthematous lesions or discoloration noted on facial skin         [] Abnormal -            Psychiatric:       [x] Normal Affect [] Abnormal -        [x] No Hallucinations    Other pertinent observable physical exam findings:-        We discussed the expected course, resolution and complications of the diagnosis(es) in detail. Medication risks, benefits, costs, interactions, and alternatives were discussed as indicated. I advised her to contact the office if her condition worsens, changes or fails to improve as anticipated. She expressed understanding with the diagnosis(es) and plan. Daniela Jain is a 65 y.o. female being evaluated by a video visit encounter for concerns as above. A caregiver was present when appropriate. Due to this being a TeleHealth encounter (During [place] [address] public health emergency), evaluation of the following organ systems was limited: Vitals/Constitutional/EENT/Resp/CV/GI//MS/Neuro/Skin/Heme-Lymph-Imm.   Pursuant to the emergency declaration under the [facility] Act, 1135 waiver authority and the Coronavirus Preparedness and Response Supplemental Appropriations Act, this Virtual  Visit was conducted, with patient's (and/or legal guardian's) consent, to reduce the patient's risk of exposure to COVID-19 and provide necessary medical care. Services were provided through a video synchronous discussion virtually to substitute for in-person clinic visit. Patient and provider were located at their individual homes.         Yanni Siddiqui MD

## 2020-04-13 ENCOUNTER — PATIENT OUTREACH (OUTPATIENT)
Dept: CARDIOLOGY CLINIC | Age: 70
End: 2020-04-13

## 2020-04-13 NOTE — PROGRESS NOTES
Patient resolved from Transition of Care episode on 4/13/20. Patient/family has been provided the following resources and education related to COVID-19:                         Signs, symptoms and red flags related to COVID-19            CDC exposure and quarantine guidelines            Conduit exposure contact - [phone]            Contact for their local Department of Health                 Patient currently reports that the following symptoms have improved:  no new/worsening symptoms     No further outreach scheduled with this CTN/ACM. Episode of Care resolved. Patient has this CTN/ACM contact information if future needs arise.

## 2020-04-14 ENCOUNTER — HOME CARE VISIT (OUTPATIENT)
Dept: HOME HEALTH SERVICES | Facility: HOME HEALTH | Age: 70
End: 2020-04-14
Payer: MEDICARE

## 2020-04-14 ENCOUNTER — TELEPHONE (OUTPATIENT)
Dept: INTERNAL MEDICINE CLINIC | Age: 70
End: 2020-04-14

## 2020-04-14 ENCOUNTER — HOME CARE VISIT (OUTPATIENT)
Dept: SCHEDULING | Facility: HOME HEALTH | Age: 70
End: 2020-04-14
Payer: MEDICARE

## 2020-04-14 VITALS
RESPIRATION RATE: 20 BRPM | OXYGEN SATURATION: 99 % | HEART RATE: 103 BPM | TEMPERATURE: 98.4 F | DIASTOLIC BLOOD PRESSURE: 60 MMHG | SYSTOLIC BLOOD PRESSURE: 124 MMHG

## 2020-04-14 PROCEDURE — G0300 HHS/HOSPICE OF LPN EA 15 MIN: HCPCS

## 2020-04-14 RX ORDER — LANCETS
EACH MISCELLANEOUS
Qty: 1 PACKAGE | Refills: 11 | Status: SHIPPED | OUTPATIENT
Start: 2020-04-14

## 2020-04-14 NOTE — TELEPHONE ENCOUNTER
[facility]. [facility] given by Dr. Adry Mccain to place order for Test strips and Lancets for One touch ultra 2. Test blood sugars BID daily.      Roger Pry, LPN

## 2020-04-15 ENCOUNTER — HOME CARE VISIT (OUTPATIENT)
Dept: SCHEDULING | Facility: HOME HEALTH | Age: 70
End: 2020-04-15
Payer: MEDICARE

## 2020-04-15 ENCOUNTER — HOME CARE VISIT (OUTPATIENT)
Dept: HOME HEALTH SERVICES | Facility: HOME HEALTH | Age: 70
End: 2020-04-15
Payer: MEDICARE

## 2020-04-15 VITALS
SYSTOLIC BLOOD PRESSURE: 130 MMHG | RESPIRATION RATE: 16 BRPM | DIASTOLIC BLOOD PRESSURE: 62 MMHG | OXYGEN SATURATION: 8 % | TEMPERATURE: 97.6 F | HEART RATE: 99 BPM

## 2020-04-15 PROCEDURE — G0151 HHCP-SERV OF PT,EA 15 MIN: HCPCS

## 2020-04-17 ENCOUNTER — HOME CARE VISIT (OUTPATIENT)
Dept: HOME HEALTH SERVICES | Facility: HOME HEALTH | Age: 70
End: 2020-04-17
Payer: MEDICARE

## 2020-04-17 ENCOUNTER — HOME CARE VISIT (OUTPATIENT)
Dept: SCHEDULING | Facility: HOME HEALTH | Age: 70
End: 2020-04-17
Payer: MEDICARE

## 2020-04-17 PROCEDURE — G0151 HHCP-SERV OF PT,EA 15 MIN: HCPCS

## 2020-04-18 VITALS
TEMPERATURE: 98.5 F | SYSTOLIC BLOOD PRESSURE: 128 MMHG | HEART RATE: 85 BPM | OXYGEN SATURATION: 99 % | DIASTOLIC BLOOD PRESSURE: 60 MMHG

## 2020-04-22 ENCOUNTER — HOME CARE VISIT (OUTPATIENT)
Dept: SCHEDULING | Facility: HOME HEALTH | Age: 70
End: 2020-04-22
Payer: MEDICARE

## 2020-04-22 ENCOUNTER — HOME CARE VISIT (OUTPATIENT)
Dept: HOME HEALTH SERVICES | Facility: HOME HEALTH | Age: 70
End: 2020-04-22
Payer: MEDICARE

## 2020-04-22 ENCOUNTER — VIRTUAL VISIT (OUTPATIENT)
Dept: NEUROLOGY | Age: 70
End: 2020-04-22

## 2020-04-22 ENCOUNTER — TELEPHONE (OUTPATIENT)
Dept: NEUROLOGY | Age: 70
End: 2020-04-22

## 2020-04-22 DIAGNOSIS — R44.0 AUDITORY HALLUCINATION: ICD-10-CM

## 2020-04-22 DIAGNOSIS — G47.01 INSOMNIA DUE TO MEDICAL CONDITION: ICD-10-CM

## 2020-04-22 DIAGNOSIS — G30.1 LATE ONSET ALZHEIMER'S DISEASE WITHOUT BEHAVIORAL DISTURBANCE (HCC): Primary | ICD-10-CM

## 2020-04-22 DIAGNOSIS — Z86.73 HISTORY OF STROKE: ICD-10-CM

## 2020-04-22 DIAGNOSIS — E11.69 TYPE 2 DIABETES MELLITUS WITH OTHER SPECIFIED COMPLICATION, WITH LONG-TERM CURRENT USE OF INSULIN (HCC): ICD-10-CM

## 2020-04-22 DIAGNOSIS — F32.A ANXIETY AND DEPRESSION: ICD-10-CM

## 2020-04-22 DIAGNOSIS — F41.9 ANXIETY AND DEPRESSION: ICD-10-CM

## 2020-04-22 DIAGNOSIS — Z79.4 TYPE 2 DIABETES MELLITUS WITH OTHER SPECIFIED COMPLICATION, WITH LONG-TERM CURRENT USE OF INSULIN (HCC): ICD-10-CM

## 2020-04-22 DIAGNOSIS — I10 ESSENTIAL HYPERTENSION: ICD-10-CM

## 2020-04-22 DIAGNOSIS — Z79.02 ANTIPLATELET OR ANTITHROMBOTIC LONG-TERM USE: ICD-10-CM

## 2020-04-22 DIAGNOSIS — F02.80 LATE ONSET ALZHEIMER'S DISEASE WITHOUT BEHAVIORAL DISTURBANCE (HCC): Primary | ICD-10-CM

## 2020-04-22 PROCEDURE — G0151 HHCP-SERV OF PT,EA 15 MIN: HCPCS

## 2020-04-22 PROCEDURE — G0299 HHS/HOSPICE OF RN EA 15 MIN: HCPCS

## 2020-04-22 RX ORDER — QUETIAPINE FUMARATE 50 MG/1
TABLET, FILM COATED ORAL
Qty: 90 TAB | Refills: 3 | Status: SHIPPED | OUTPATIENT
Start: 2020-04-22 | End: 2020-04-25

## 2020-04-22 RX ORDER — MEMANTINE HYDROCHLORIDE 10 MG/1
TABLET ORAL
Qty: 60 TAB | Refills: 3 | Status: SHIPPED | OUTPATIENT
Start: 2020-04-22 | End: 2020-08-26

## 2020-04-22 NOTE — PROGRESS NOTES
ALEXANDRA to call office to complete check in process [id number]    Chief Complaint   Patient presents with    Dementia    Anxiety    Tremors     Was taking propranolol for tremor but did not like the way it made her feel so d/c around Dec.    Would like to discuss medication for AD and anxiety after consulting with Dr. Kenn Guerrero

## 2020-04-22 NOTE — TELEPHONE ENCOUNTER
----- Message from makemoji sent at 4/22/2020  8:43 AM EDT -----  Regarding: Dr. Karel Floyd  Patient return call    Caller's first and last name and relationship (if not the patient): Roman Hinson contact number(s): [phone]      Whose call is being returned: Jody Arreaga       Details to clarify the request: Sophie Sires Returning Satcy's call about appt this morning to be checked in       [employer]

## 2020-04-22 NOTE — PROGRESS NOTES
[facility] NEUROLOGY [facility]   [place] [address]   [address]    [facility]    Fax        Jo Ann Otero is a 65 y.o. female who was seen by synchronous (real-time) audio-video technology on 4/22/2020. Consent:  She and/or her healthcare decision maker is aware that this patient-initiated Telehealth encounter is a billable service, with coverage as determined by her insurance carrier. She is aware that she may receive a bill and has provided verbal consent to proceed: Yes    I was in the office while conducting this encounter. Subjective:   J oAnn Otero was seen for Dementia; Anxiety; and Tremors    68-year-old lady who is seen in follow-up. Her last visit with me was December 2019 when she came in after having had cerebrovascular accident. She was also having auditory hallucinations and decreased awareness and we sent her for several studies. We sent her for an EEG which is reviewed and normal.  She had a brainstem auditory evoked potential and that was normal.  Carotid Doppler demonstrated 16-49% stenosis bilaterally. In the interim since I saw her last she has been in the hospital.  Discharge summary from 3/27/2020 where she was admitted to the hospital with diabetic ketoacidosis. She also had acute kidney injury. While hospitalized she also had MRI MRA of the brain as well as a another carotid Doppler. I viewed all those personally. She had no acute infarct. No intracranial flow-limiting stenoses. No large vessel occlusion. Carotid Doppler consistent with that done in the office. She did have her formal neuropsychological evaluation with Dr. Dori Davis and this was performed on 3/4/2020. This demonstrated findings consistent with Alzheimer's as well as anxiety and depression. She continues to be compliant with her Plavix. She is on Zoloft. She is also on Seroquel for the hallucinations.     Patient is seen today accompanied by her daughter. Patient has discussed her neuropsychological evaluation with Dr. José Miguel Renee. She has some anxiety surrounding the dementia diagnosis. She has been working through that. She continues with the auditory hallucinations. At the initiation of Seroquel the hallucinations got much better but they have gotten worse again. They are typically voices of family members who have passed away. Today they are worse because she is seeing me and typically when she has to see the doctor the voices will threaten her and say that they will kill her if she sees me. She tries to ignore them. She has no thoughts of harming herself. She has not tried to harm her self. She is not sleeping well although she was sleeping better in the past with the Seroquel. Tolerates it and is not sleepy during the day. No further stroke symptoms. Continues with her Plavix. She has not been ill. No fever or chills. No cough. No palpitations chest pain shortness of breath. Prior to Admission medications    Medication Sig Start Date End Date Taking? Authorizing Provider   glucose blood VI test strips (ASCENSIA AUTODISC VI, ONE TOUCH ULTRA TEST VI) strip One Touch Ultra 2 Test strips. Test Blood sugars BID. DX E11.8 4/14/20  Yes Adia Werner MD   sertraline (ZOLOFT) 50 mg tablet TAKE 1/2 TABLET IN THE MORNING AND 1 TABLET AT BEDTIME 3/30/20  Yes Adia Werner MD   insulin detemir U-100 (LEVEMIR FLEXTOUCH) 100 unit/mL (3 mL) inpn 40 Units by SubCUTAneous route nightly. Patient taking differently: 42 Units by SubCUTAneous route nightly. 3/27/20  Yes Sarahi Dobson MD   acetaminophen (TYLENOL) 500 mg tablet Take 1,500 mg by mouth two (2) times daily as needed for Pain. Yes Provider, Historical   clopidogreL (PLAVIX) 75 mg tab TAKE 1 TABLET BY MOUTH DAILY 2/25/20  Yes Adia Werner MD   lisinopril (PRINIVIL, ZESTRIL) 30 mg tablet Take 1 Tab by mouth daily.  2/25/20  Yes Adia Werner MD   pantoprazole (PROTONIX) 40 mg tablet Take 1 Tab by mouth daily. 2/25/20  Yes Madi George MD   amLODIPine (NORVASC) 10 mg tablet amlodipine 10 mg tablet 1/28/20  Yes Madi George MD   QUEtiapine (SEROQUEL) 50 mg tablet Take 1 Tab by mouth two (2) times a day. 1/2/20  Yes Brit Matthews MD   lancets misc Test Blood Sugars BID. DX E11.8 4/14/20   Madi George MD     Allergies   Allergen Reactions    Morphine Itching     Per patient's daughter, severe itching after Morphine IV. ROS  Pertinent positives and negatives as noted with remainder of comprehensive review negative    Examination  Pleasant lady. Awake and alert. She discusses current events in the news. She is fluent. Appropriately dressed and properly groomed. Her affect seems appropriate today. She does defer to her daughter to answer some questions. Full versions. Symmetric face. Moves all 4 extremities. No ataxia      Due to this being a TeleHealth evaluation, many elements of the physical examination are unable to be assessed. Impression/Plan  History of stroke without recurrence. Continue with Plavix. Continue to modify modifiable risk factors including blood pressure, diabetes as well as keeping her LDL below 70    New diagnosis of Alzheimer's disease and we discussed this. Discussed progression. Discussed goals of therapy which is hopefully stabilization and slowing of decline. To that end start Namenda in a customary fashion today. Discussed studies, administration, mechanism of action, expectation again of stabilization and not improvement. We will have her on this for about 8 weeks and then see her back and start Aricept. Hallucinations likely rooted in her dementia and given the fact she responded to the Seroquel initially we will increase that dose to a dose of 50/100    Complaints of anxiety and she is on Zoloft. Continue that.   Discussed that both the Namenda as well as the Seroquel may actually help quell some without anxiety as well as a secondary benefit. Answered questions in that regard. Decreased sleep and we discussed Seroquel is likely to help with that as well    Follow in 8 weeks    Pursuant to the emergency declaration under the [address] and the [place] [id number] waiver authority and the [employer] Act, this Virtual  Visit was conducted, with patient's consent, to reduce the patient's risk of exposure to COVID-19 and provide continuity of care for an established patient. Services were provided through a video synchronous discussion virtually to substitute for in-person clinic visit. Henry Najera MD     This document was created with the assistance of voice recognition software and therefore unintended syntax errors may be present despite editing. For any questions please contact me directly. This note will not be viewable in [address]

## 2020-04-23 VITALS
DIASTOLIC BLOOD PRESSURE: 68 MMHG | OXYGEN SATURATION: 98 % | SYSTOLIC BLOOD PRESSURE: 138 MMHG | TEMPERATURE: 98.5 F | RESPIRATION RATE: 16 BRPM | HEART RATE: 105 BPM

## 2020-04-23 VITALS
HEART RATE: 105 BPM | SYSTOLIC BLOOD PRESSURE: 138 MMHG | RESPIRATION RATE: 12 BRPM | OXYGEN SATURATION: 98 % | TEMPERATURE: 98.5 F | DIASTOLIC BLOOD PRESSURE: 68 MMHG

## 2020-04-24 ENCOUNTER — HOME CARE VISIT (OUTPATIENT)
Dept: SCHEDULING | Facility: HOME HEALTH | Age: 70
End: 2020-04-24
Payer: MEDICARE

## 2020-04-24 ENCOUNTER — HOME CARE VISIT (OUTPATIENT)
Dept: HOME HEALTH SERVICES | Facility: HOME HEALTH | Age: 70
End: 2020-04-24
Payer: MEDICARE

## 2020-04-24 VITALS
HEART RATE: 87 BPM | SYSTOLIC BLOOD PRESSURE: 103 MMHG | DIASTOLIC BLOOD PRESSURE: 40 MMHG | OXYGEN SATURATION: 100 % | RESPIRATION RATE: 12 BRPM | TEMPERATURE: 97.8 F

## 2020-04-24 PROCEDURE — G0151 HHCP-SERV OF PT,EA 15 MIN: HCPCS

## 2020-04-25 DIAGNOSIS — R44.0 AUDITORY HALLUCINATION: ICD-10-CM

## 2020-04-25 RX ORDER — QUETIAPINE FUMARATE 50 MG/1
TABLET, FILM COATED ORAL
Qty: 60 TAB | Refills: 2 | Status: SHIPPED | OUTPATIENT
Start: 2020-04-25 | End: 2020-10-06

## 2020-04-29 ENCOUNTER — HOME CARE VISIT (OUTPATIENT)
Dept: HOME HEALTH SERVICES | Facility: HOME HEALTH | Age: 70
End: 2020-04-29
Payer: MEDICARE

## 2020-05-01 ENCOUNTER — HOME CARE VISIT (OUTPATIENT)
Dept: HOME HEALTH SERVICES | Facility: HOME HEALTH | Age: 70
End: 2020-05-01
Payer: MEDICARE

## 2020-05-01 ENCOUNTER — HOME CARE VISIT (OUTPATIENT)
Dept: SCHEDULING | Facility: HOME HEALTH | Age: 70
End: 2020-05-01
Payer: MEDICARE

## 2020-05-01 PROCEDURE — 400013 HH SOC

## 2020-05-01 PROCEDURE — G0151 HHCP-SERV OF PT,EA 15 MIN: HCPCS

## 2020-05-03 VITALS
SYSTOLIC BLOOD PRESSURE: 128 MMHG | HEART RATE: 92 BPM | DIASTOLIC BLOOD PRESSURE: 62 MMHG | RESPIRATION RATE: 12 BRPM | OXYGEN SATURATION: 98 % | TEMPERATURE: 98 F

## 2020-05-06 ENCOUNTER — HOME CARE VISIT (OUTPATIENT)
Dept: SCHEDULING | Facility: HOME HEALTH | Age: 70
End: 2020-05-06
Payer: MEDICARE

## 2020-05-06 ENCOUNTER — HOME CARE VISIT (OUTPATIENT)
Dept: HOME HEALTH SERVICES | Facility: HOME HEALTH | Age: 70
End: 2020-05-06
Payer: MEDICARE

## 2020-05-06 PROCEDURE — G0151 HHCP-SERV OF PT,EA 15 MIN: HCPCS

## 2020-05-07 VITALS
DIASTOLIC BLOOD PRESSURE: 68 MMHG | TEMPERATURE: 97.9 F | OXYGEN SATURATION: 98 % | RESPIRATION RATE: 16 BRPM | SYSTOLIC BLOOD PRESSURE: 144 MMHG | HEART RATE: 101 BPM

## 2020-05-08 ENCOUNTER — HOME CARE VISIT (OUTPATIENT)
Dept: HOME HEALTH SERVICES | Facility: HOME HEALTH | Age: 70
End: 2020-05-08
Payer: MEDICARE

## 2020-05-08 ENCOUNTER — HOME CARE VISIT (OUTPATIENT)
Dept: SCHEDULING | Facility: HOME HEALTH | Age: 70
End: 2020-05-08
Payer: MEDICARE

## 2020-05-08 VITALS
RESPIRATION RATE: 12 BRPM | DIASTOLIC BLOOD PRESSURE: 60 MMHG | OXYGEN SATURATION: 96 % | HEART RATE: 97 BPM | SYSTOLIC BLOOD PRESSURE: 118 MMHG | TEMPERATURE: 98.2 F

## 2020-05-08 PROCEDURE — G0151 HHCP-SERV OF PT,EA 15 MIN: HCPCS

## 2020-05-29 ENCOUNTER — APPOINTMENT (OUTPATIENT)
Dept: CT IMAGING | Age: 70
End: 2020-05-29
Attending: EMERGENCY MEDICINE
Payer: MEDICARE

## 2020-05-29 ENCOUNTER — APPOINTMENT (OUTPATIENT)
Dept: GENERAL RADIOLOGY | Age: 70
End: 2020-05-29
Attending: EMERGENCY MEDICINE
Payer: MEDICARE

## 2020-05-29 ENCOUNTER — HOSPITAL ENCOUNTER (EMERGENCY)
Age: 70
Discharge: HOME | End: 2020-05-29
Attending: EMERGENCY MEDICINE
Payer: MEDICARE

## 2020-05-29 VITALS
TEMPERATURE: 98.4 F | RESPIRATION RATE: 16 BRPM | WEIGHT: 155 LBS | OXYGEN SATURATION: 97 % | SYSTOLIC BLOOD PRESSURE: 123 MMHG | HEART RATE: 100 BPM | DIASTOLIC BLOOD PRESSURE: 47 MMHG | BODY MASS INDEX: 28.52 KG/M2 | HEIGHT: 62 IN

## 2020-05-29 DIAGNOSIS — E87.20 LACTIC ACIDOSIS: ICD-10-CM

## 2020-05-29 DIAGNOSIS — G40.919 BREAKTHROUGH SEIZURE (HCC): Primary | ICD-10-CM

## 2020-05-29 DIAGNOSIS — R73.9 HYPERGLYCEMIA: ICD-10-CM

## 2020-05-29 LAB
ALBUMIN SERPL-MCNC: 3.2 G/DL (ref 3.5–5)
ALBUMIN/GLOB SERPL: 0.7 {RATIO} (ref 1.1–2.2)
ALP SERPL-CCNC: 149 U/L (ref 45–117)
ALT SERPL-CCNC: 45 U/L (ref 12–78)
ANION GAP SERPL CALC-SCNC: 5 MMOL/L (ref 5–15)
AST SERPL-CCNC: 67 U/L (ref 15–37)
BASOPHILS # BLD: 0 K/UL (ref 0–0.1)
BASOPHILS NFR BLD: 1 % (ref 0–1)
BILIRUB SERPL-MCNC: 0.7 MG/DL (ref 0.2–1)
BUN SERPL-MCNC: 24 MG/DL (ref 6–20)
BUN/CREAT SERPL: 17 (ref 12–20)
CALCIUM SERPL-MCNC: 9.1 MG/DL (ref 8.5–10.1)
CHLORIDE SERPL-SCNC: 105 MMOL/L (ref 97–108)
CO2 SERPL-SCNC: 27 MMOL/L (ref 21–32)
COMMENT, HOLDF: NORMAL
CREAT SERPL-MCNC: 1.45 MG/DL (ref 0.55–1.02)
DIFFERENTIAL METHOD BLD: ABNORMAL
EOSINOPHIL # BLD: 0 K/UL (ref 0–0.4)
EOSINOPHIL NFR BLD: 1 % (ref 0–7)
ERYTHROCYTE [DISTWIDTH] IN BLOOD BY AUTOMATED COUNT: 17.7 % (ref 11.5–14.5)
GLOBULIN SER CALC-MCNC: 4.7 G/DL (ref 2–4)
GLUCOSE SERPL-MCNC: 362 MG/DL (ref 65–100)
HCT VFR BLD AUTO: 26.2 % (ref 35–47)
HGB BLD-MCNC: 7.7 G/DL (ref 11.5–16)
IMM GRANULOCYTES # BLD AUTO: 0 K/UL (ref 0–0.04)
IMM GRANULOCYTES NFR BLD AUTO: 0 % (ref 0–0.5)
INR PPP: 1.3 (ref 0.9–1.1)
LACTATE SERPL-SCNC: 2.3 MMOL/L (ref 0.4–2)
LACTATE SERPL-SCNC: 2.4 MMOL/L (ref 0.4–2)
LYMPHOCYTES # BLD: 1.2 K/UL (ref 0.8–3.5)
LYMPHOCYTES NFR BLD: 26 % (ref 12–49)
MCH RBC QN AUTO: 22.3 PG (ref 26–34)
MCHC RBC AUTO-ENTMCNC: 29.4 G/DL (ref 30–36.5)
MCV RBC AUTO: 75.9 FL (ref 80–99)
MONOCYTES # BLD: 0.4 K/UL (ref 0–1)
MONOCYTES NFR BLD: 9 % (ref 5–13)
NEUTS SEG # BLD: 2.8 K/UL (ref 1.8–8)
NEUTS SEG NFR BLD: 63 % (ref 32–75)
NRBC # BLD: 0 K/UL (ref 0–0.01)
NRBC BLD-RTO: 0 PER 100 WBC
PLATELET # BLD AUTO: 161 K/UL (ref 150–400)
PMV BLD AUTO: 11.2 FL (ref 8.9–12.9)
POTASSIUM SERPL-SCNC: 4.5 MMOL/L (ref 3.5–5.1)
PROT SERPL-MCNC: 7.9 G/DL (ref 6.4–8.2)
PROTHROMBIN TIME: 13.1 SEC (ref 9–11.1)
RBC # BLD AUTO: 3.45 M/UL (ref 3.8–5.2)
SAMPLES BEING HELD,HOLD: NORMAL
SODIUM SERPL-SCNC: 137 MMOL/L (ref 136–145)
TROPONIN I SERPL-MCNC: <0.05 NG/ML
WBC # BLD AUTO: 4.4 K/UL (ref 3.6–11)

## 2020-05-29 PROCEDURE — 83605 ASSAY OF LACTIC ACID: CPT

## 2020-05-29 PROCEDURE — 93005 ELECTROCARDIOGRAM TRACING: CPT

## 2020-05-29 PROCEDURE — 85025 COMPLETE CBC W/AUTO DIFF WBC: CPT

## 2020-05-29 PROCEDURE — 96360 HYDRATION IV INFUSION INIT: CPT

## 2020-05-29 PROCEDURE — 71045 X-RAY EXAM CHEST 1 VIEW: CPT

## 2020-05-29 PROCEDURE — 84484 ASSAY OF TROPONIN QUANT: CPT

## 2020-05-29 PROCEDURE — 96361 HYDRATE IV INFUSION ADD-ON: CPT

## 2020-05-29 PROCEDURE — 80053 COMPREHEN METABOLIC PANEL: CPT

## 2020-05-29 PROCEDURE — 74011250636 HC RX REV CODE- 250/636: Performed by: EMERGENCY MEDICINE

## 2020-05-29 PROCEDURE — 36415 COLL VENOUS BLD VENIPUNCTURE: CPT

## 2020-05-29 PROCEDURE — 85610 PROTHROMBIN TIME: CPT

## 2020-05-29 PROCEDURE — 70450 CT HEAD/BRAIN W/O DYE: CPT

## 2020-05-29 PROCEDURE — 99285 EMERGENCY DEPT VISIT HI MDM: CPT

## 2020-05-29 RX ADMIN — SODIUM CHLORIDE 1000 ML: 900 INJECTION, SOLUTION INTRAVENOUS at 10:01

## 2020-05-29 NOTE — ED PROVIDER NOTES
78-year-old female with a history of prior CVA and resultant seizure disorder, DM, HTN, GERD, presents to the emergency department by EMS after she was found to have a seizure around 8:30 AM.  The seizure reportedly lasted about 1 to 2 minutes and was tonic-clonic in nature. She was found to be postictal on EMSs arrival and initial blood pressure was hypotensive. She was started on IV fluid bolus and she quickly returned to her neurologic baseline and had normalized blood pressure. While in route patient was pleasant and conversational stating that she is feeling better. She states that she has been taking all of her prescribed medications as directed. She denies any fall or trauma prior to the seizure. Denies any fever, chills, nausea, vomiting, diarrhea, chest pain, shortness of breath, or any other medical concerns. She states that this episode was similar to her prior seizures. She follows with Dr. Godfrey Sweeney, neurology. Past Medical History:   Diagnosis Date    Anxiety     Auditory hallucinations     Depression     Diabetes ([facility] [address]     Esophageal reflux     Hypertension     Stroke ([facility] 01/2018       No past surgical history on file. No family history on file.     Social History     Socioeconomic History    Marital status:      Spouse name: Not on file    Number of children: Not on file    Years of education: Not on file    Highest education level: Not on file   Occupational History    Not on file   Social Needs    Financial resource strain: Not on file    Food insecurity     Worry: Not on file     Inability: Not on file    Transportation needs     Medical: Not on file     Non-medical: Not on file   Tobacco Use    Smoking status: Never Smoker    Smokeless tobacco: Never Used   Substance and Sexual Activity    Alcohol use: Not Currently    Drug use: Not Currently    Sexual activity: Not Currently   Lifestyle    Physical activity     Days per week: Not on file     Minutes per session: Not on file    Stress: Not on file   Relationships    Social connections     Talks on phone: Not on file     Gets together: Not on file     Attends [employer] service: Not on file     Active member of club or organization: Not on file     Attends meetings of clubs or organizations: Not on file     Relationship status: Not on file    Intimate partner violence     Fear of current or ex partner: Not on file     Emotionally abused: Not on file     Physically abused: Not on file     Forced sexual activity: Not on file   Other Topics Concern    Not on file   Social History Narrative    Not on file         ALLERGIES: Morphine    Review of Systems   Constitutional: Positive for activity change. Negative for appetite change, chills and fever. HENT: Negative for congestion, rhinorrhea, sinus pressure, sneezing and sore throat. Eyes: Negative for photophobia and visual disturbance. Respiratory: Negative for cough and shortness of breath. Cardiovascular: Negative for chest pain. Gastrointestinal: Negative for abdominal pain, blood in stool, constipation, diarrhea, nausea and vomiting. Genitourinary: Negative for difficulty urinating, dysuria, flank pain, frequency, hematuria, menstrual problem, urgency, vaginal bleeding and vaginal discharge. Musculoskeletal: Negative for arthralgias, back pain, myalgias and neck pain. Skin: Negative for rash and wound. Neurological: Positive for seizures. Negative for syncope, facial asymmetry, speech difficulty, weakness, numbness and headaches. Psychiatric/Behavioral: Negative for self-injury and suicidal ideas. All other systems reviewed and are negative. Vitals:    05/29/20 0954   BP: 116/57   Pulse: (!) 55   Resp: 18   Temp: 98.4 °F (36.9 °C)   SpO2: 97%   Weight: 70.3 kg (155 lb)   Height: 5' 2\" (1.575 m)            Physical Exam  Vitals signs and nursing note reviewed. Constitutional:       General: She is not in acute distress. Appearance: Normal appearance. She is well-developed. She is not diaphoretic. HENT:      Head: Normocephalic and atraumatic. Nose: Nose normal.   Eyes:      Extraocular Movements: Extraocular movements intact. Conjunctiva/sclera: Conjunctivae normal.      Pupils: Pupils are equal, round, and reactive to light. Neck:      Musculoskeletal: Neck supple. Cardiovascular:      Rate and Rhythm: Normal rate and regular rhythm. Heart sounds: Normal heart sounds. Pulmonary:      Effort: Pulmonary effort is normal.      Breath sounds: Normal breath sounds. Abdominal:      General: There is no distension. Palpations: Abdomen is soft. Tenderness: There is no abdominal tenderness. Musculoskeletal:         General: No tenderness. Skin:     General: Skin is warm and dry. Neurological:      General: No focal deficit present. Mental Status: She is alert and oriented to person, place, and time. Cranial Nerves: No cranial nerve deficit. Sensory: No sensory deficit. Motor: No weakness. Coordination: Coordination normal.      Comments: 4/5 strength in right hemibody, reportedly baseline after prior CVA, but speaking fluently, GCS 15, intact finger-to-nose, negative pronator drift. MDM   64-year-old female presents with breakthrough seizure with known seizure disorder after prior CVA. Patient is afebrile with vital signs stable in no distress. Labs returned showing Hg 7.7, similar to prior measures with chronic anemia, creatinine 1.45, BUN 24, glucose 262, initial lactic acid at 2.4. CXR viewed by myself and read by radiology showing no acute abnormalities. CT head negative, but does show minimal left frontal scalp contusion. She was given IV fluid bolus and was observed in the ED and had no further seizure activity. She expressed feeling completely better and requests discharge.     Repeat lactic acid after IV fluids was downtrending, but only slightly. Patient would like to be discharged home and will follow-up with Dr. Keely Aguilar as an outpatient. She was encouraged to drink plenty of fluids and rest and continue taking her normal medications as directed and follow-up closely with Dr. Keely Aguilar to further discuss her medication regimen. She stated both understanding and agreement with this plan. Procedures    956 EKG shows normal sinus rhythm with a rate of 95 bpm with no acute ST or T wave abnormalities suggestive of ischemia, no ectopy or arrhythmia.

## 2020-05-29 NOTE — ED TRIAGE NOTES
Pt arrives via EMS w/ c/c of seizure. Family found pt on floor foaming @ mouth @ 0830. Seizure lasted 1-2 minutes. Pt was post- ictal on EMS arrival & hypotensive. Pt alert & conversational @ this time.

## 2020-05-31 LAB
ATRIAL RATE: 95 BPM
CALCULATED P AXIS, ECG09: 40 DEGREES
CALCULATED R AXIS, ECG10: 3 DEGREES
CALCULATED T AXIS, ECG11: 19 DEGREES
DIAGNOSIS, 93000: NORMAL
P-R INTERVAL, ECG05: 150 MS
Q-T INTERVAL, ECG07: 370 MS
QRS DURATION, ECG06: 68 MS
QTC CALCULATION (BEZET), ECG08: 464 MS
VENTRICULAR RATE, ECG03: 95 BPM

## 2020-06-01 RX ORDER — CLOPIDOGREL BISULFATE 75 MG/1
TABLET ORAL
Qty: 30 TAB | Refills: 2 | Status: SHIPPED | OUTPATIENT
Start: 2020-06-01 | End: 2020-09-11

## 2020-06-18 ENCOUNTER — OFFICE VISIT (OUTPATIENT)
Dept: NEUROLOGY | Age: 70
End: 2020-06-18

## 2020-06-18 VITALS
WEIGHT: 157 LBS | SYSTOLIC BLOOD PRESSURE: 120 MMHG | HEIGHT: 63 IN | TEMPERATURE: 97.4 F | OXYGEN SATURATION: 100 % | RESPIRATION RATE: 16 BRPM | HEART RATE: 102 BPM | DIASTOLIC BLOOD PRESSURE: 50 MMHG | BODY MASS INDEX: 27.82 KG/M2

## 2020-06-18 DIAGNOSIS — R44.0 AUDITORY HALLUCINATION: ICD-10-CM

## 2020-06-18 DIAGNOSIS — G30.1 LATE ONSET ALZHEIMER'S DISEASE WITHOUT BEHAVIORAL DISTURBANCE (HCC): Primary | ICD-10-CM

## 2020-06-18 DIAGNOSIS — F02.80 LATE ONSET ALZHEIMER'S DISEASE WITHOUT BEHAVIORAL DISTURBANCE (HCC): Primary | ICD-10-CM

## 2020-06-18 NOTE — PATIENT INSTRUCTIONS
Preventing Falls: Care Instructions Your Care Instructions Getting around your home safely can be a challenge if you have injuries or health problems that make it easy for you to fall. Loose rugs and furniture in walkways are among the dangers for many older people who have problems walking or who have poor eyesight. People who have conditions such as arthritis, osteoporosis, or dementia also have to be careful not to fall. You can make your home safer with a few simple measures. Follow-up care is a key part of your treatment and safety. Be sure to make and go to all appointments, and call your doctor if you are having problems. It's also a good idea to know your test results and keep a list of the medicines you take. How can you care for yourself at home? Taking care of yourself · You may get dizzy if you do not drink enough water. To prevent dehydration, drink plenty of fluids, enough so that your urine is light yellow or clear like water. Choose water and other caffeine-free clear liquids. If you have kidney, heart, or liver disease and have to limit fluids, talk with your doctor before you increase the amount of fluids you drink. · Exercise regularly to improve your strength, muscle tone, and balance. Walk if you can. Swimming may be a good choice if you cannot walk easily. · Have your vision and hearing checked each year or any time you notice a change. If you have trouble seeing and hearing, you might not be able to avoid objects and could lose your balance. · Know the side effects of the medicines you take. Ask your doctor or pharmacist whether the medicines you take can affect your balance. Sleeping pills or sedatives can affect your balance. · Limit the amount of alcohol you drink. Alcohol can impair your balance and other senses. · Ask your doctor whether calluses or corns on your feet need to be removed.  If you wear loose-fitting shoes because of calluses or corns, you can lose your balance and fall. · Talk to your doctor if you have numbness in your feet. Preventing falls at home · Remove raised doorway thresholds, throw rugs, and clutter. Repair loose carpet or raised areas in the floor. · Move furniture and electrical cords to keep them out of walking paths. · Use nonskid floor wax, and wipe up spills right away, especially on ceramic tile floors. · If you use a walker or cane, put rubber tips on it. If you use crutches, clean the bottoms of them regularly with an abrasive pad, such as steel wool. · Keep your house well lit, especially [place], and outside walkways. Use night-lights in areas such as hallways and bathrooms. Add extra light switches or use remote switches (such as switches that go on or off when you clap your hands) to make it easier to turn lights on if you have to get up during the night. · Install sturdy handrails on stairways. · Move items in your cabinets so that the things you use a lot are on the lower shelves (about waist level). · Keep a cordless phone and a flashlight with new batteries by your bed. If possible, put a phone in each of the main rooms of your house, or carry a cell phone in case you fall and cannot reach a phone. Or, you can wear a device around your neck or wrist. You push a button that sends a signal for help. · Wear low-heeled shoes that fit well and give your feet good support. Use footwear with nonskid soles. Check the heels and soles of your shoes for wear. Repair or replace worn heels or soles. · Do not wear socks without shoes on wood floors. · Walk on the grass when the sidewalks are slippery. If you live in an area that gets snow and ice in the winter, sprinkle salt on slippery steps and sidewalks. Preventing falls in the bath · Install grab bars and nonskid mats inside and outside your shower or tub and near the toilet and sinks. · Use shower chairs and bath benches. · Use a hand-held shower head that will allow you to sit while showering. · Get into a tub or shower by putting the weaker leg in first. Get out of a tub or shower with your strong side first. 
· Repair loose toilet seats and consider installing a raised toilet seat to make getting on and off the toilet easier. · Keep your bathroom door unlocked while you are in the shower. Where can you learn more? Go to http://dimitry-casper.info/ Enter 0476 79 [id number] in the search box to learn more about \"Preventing Falls: Care Instructions. \" Current as of: August 7, 2019               Content Version: 12.5 © 9102-0094 [employer]. Care instructions adapted under license by [employer] (which disclaims liability or warranty for this information). If you have questions about a medical condition or this instruction, always ask your healthcare professional. [address] any warranty or liability for your use of this information. How to Get Up Safely After a Fall: Care Instructions Your Care Instructions If you have injuries, health problems, or other reasons that may make it easy for you to fall at home, it is a good idea to learn how to get up safely after a fall. Learning how to get up correctly can help you avoid making an injury worse. Also, knowing what to do if you cannot get up can help you stay safe until help arrives. Follow-up care is a key part of your treatment and safety. Be sure to make and go to all appointments, and call your doctor if you are having problems. It's also a good idea to know your test results and keep a list of the medicines you take. How can you care for yourself after a fall? If you think you can get up First lie still for a few minutes and think about how you feel. If your body feels okay and you think you can get up safely, follow the rest of the steps below: 1. Look for a chair or other piece of furniture that is close to you. 2. Roll onto your side and rest. Roll by turning your head in the direction you want to roll, move your shoulder and arm, then hip and leg in the same direction. 3. Lie still for a moment to let your blood pressure adjust. 
4. Slowly push your upper body up, lift your head, and take a moment to rest. 
5. Slowly get up on your hands and knees, and crawl to the chair or other stable piece of furniture. 6. Put your hands on the chair. 7. Move one foot forward, and place it flat on the floor. Your other leg should be bent with the knee on the floor. 8. Rise slowly, turn your body, and sit in the chair. Stay seated for a bit and think about how you feel. Call for help. Even if you feel okay, let someone know what happened to you. You might not know that you have a serious injury. If you cannot get up 1. If you think you are injured after a fall or you cannot get up, try not to panic. 2. Call out for help. 3. If you have a phone within reach or you have an emergency call device, use it to call for help. 4. If you do not have a phone within reach, try to slide yourself toward it. If you cannot get to the phone, try to slide toward a door or window or a place where you think you can be heard. 5. [place] or use an object to make noise so someone might hear you. 6. If you can reach something that you can use for a pillow, place it under your head. Try to stay warm by covering yourself with a blanket or clothing while you wait for help. When should you call for help? [id number] anytime you think you may need emergency care. For example, call if: 
· You passed out (lost consciousness). · You cannot get up after a fall. · You have severe pain. Call your doctor now or seek immediate medical care if: 
· You have new or worse pain. · You are dizzy or lightheaded. · You hit your head. Watch closely for changes in your health, and be sure to contact your doctor if: 
· You do not get better as expected. Where can you learn more? Go to http://dimitry-casper.info/ Enter I722 in the search box to learn more about \"How to Get Up Safely After a Fall: Care Instructions. \" Current as of: August 7, 2019               Content Version: 12.5 © 0052-3845 [employer]. Care instructions adapted under license by [employer] (which disclaims liability or warranty for this information). If you have questions about a medical condition or this instruction, always ask your healthcare professional. [address] any warranty or liability for your use of this information.

## 2020-06-18 NOTE — PROGRESS NOTES
[facility]rology Clinics and [address] at [facility] Neurology Clinics at [address] [facility] [address], [address] [facility] [address] [facility], [address] [place] [address]   [phone]              Chief Complaint   Patient presents with    Dementia     Current Outpatient Medications   Medication Sig Dispense Refill    clopidogreL (PLAVIX) 75 mg tab TAKE 1 TABLET BY MOUTH DAILY 30 Tab 2    QUEtiapine (SEROquel) 50 mg tablet TAKE 1 TABLET BY MOUTH TWICE DAILY 60 Tab 2    memantine (Namenda) 10 mg tablet 1/2 po every day x 1 week then 1/2 tab bid x 1 week then 1 qam and 1/2 qpm x 1 week then 1 po bid thereafter (Patient taking differently: Take 10 mg by mouth two (2) times a day.) 60 Tab 3    glucose blood VI test strips (ASCENSIA AUTODISC VI, ONE TOUCH ULTRA TEST VI) strip One Touch Ultra 2 Test strips. Test Blood sugars BID. DX E11.8 50 Strip 6    lancets misc Test Blood Sugars BID. DX E11.8 1 Package 11    sertraline (ZOLOFT) 50 mg tablet TAKE 1/2 TABLET IN THE MORNING AND 1 TABLET AT BEDTIME 90 Tab 3    insulin detemir U-100 (LEVEMIR FLEXTOUCH) 100 unit/mL (3 mL) inpn 40 Units by SubCUTAneous route nightly. (Patient taking differently: 42 Units by SubCUTAneous route nightly.) 1 Adjustable Dose Pre-filled Pen Syringe 0    acetaminophen (TYLENOL) 500 mg tablet Take 1,500 mg by mouth two (2) times daily as needed for Pain.  lisinopril (PRINIVIL, ZESTRIL) 30 mg tablet Take 1 Tab by mouth daily. 90 Tab 1    pantoprazole (PROTONIX) 40 mg tablet Take 1 Tab by mouth daily. 90 Tab 1    amLODIPine (NORVASC) 10 mg tablet amlodipine 10 mg tablet (Patient taking differently: Take 10 mg by mouth daily. amlodipine 10 mg tablet) 90 Tab 1      Allergies   Allergen Reactions    Morphine Itching     Per patient's daughter, severe itching after Morphine IV.      Social History     Tobacco Use    Smoking status: Never Smoker    Smokeless tobacco: Never Used   Substance Use Topics    Alcohol use: Not Currently    Drug use: Not Currently     Patient returns today in follow-up with her daughter. She has dementia likely the Alzheimer's type. I saw her on April 22. We started her on Namenda. She also has hallucinations and she responded to Seroquel. She also has anxiety and is on Zoloft. This is her 8-week follow-up status post the introduction of Namenda. Her daughter notes that with the Namenda she seems to be more tremulous. She would like she was more imbalanced as well. She took 2 falls. Still having some auditory hallucinations but better with Seroquel. Daughter decreased the Seroquel somewhat as well and she has not fallen anymore. Questions today regarding diagnosis, progression exploring behavior, losing weight etc.  She does have a caregiver now. Examination  Visit Vitals  /50 (BP 1 Location: Left arm, BP Patient Position: Sitting)   Pulse (!) 102   Temp 97.4 °F (36.3 °C)   Resp 16   Ht 5' 3\" (1.6 m)   Wt 71.2 kg (157 lb)   SpO2 100%   BMI 27.81 kg/m²     Pleasant lady. Appropriately dressed and groomed. Interactive. She is not oriented to the month or year. She recalls the current president the previous president as well as Michael Rocha. No motor focality. No cranial nerve deficit. No ataxia. She is steady today.     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  Questions and concerns answered today at length  Follow in 2 months and see how she is doing on the lower dose of medicine    Indigo Cisneros MD    Total time: 25 min   Counseling / coordination time: 15 min   > 50% counseling / coordination?: Yes re: as documented above      This note was created using voice recognition software. Despite editing, there may be syntax errors. This note will not be viewable in [address]

## 2020-06-18 NOTE — PROGRESS NOTES
Chief Complaint   Patient presents with    Dementia     8 wk f/u after starting memantine. Did not like it at first, did not feel like herself. Still has episodes of confusion daughter. Increased tremor since starting memantine per daughter.

## 2020-07-02 ENCOUNTER — IP HISTORICAL/CONVERTED ENCOUNTER (OUTPATIENT)
Dept: OTHER | Age: 70
End: 2020-07-02

## 2020-07-08 ENCOUNTER — HOME HEALTH ADMISSION (OUTPATIENT)
Dept: HOME HEALTH SERVICES | Facility: HOME HEALTH | Age: 70
End: 2020-07-08
Payer: MEDICARE

## 2020-07-10 ENCOUNTER — HOME CARE VISIT (OUTPATIENT)
Dept: SCHEDULING | Facility: HOME HEALTH | Age: 70
End: 2020-07-10
Payer: MEDICARE

## 2020-07-10 ENCOUNTER — HOME CARE VISIT (OUTPATIENT)
Dept: HOME HEALTH SERVICES | Facility: HOME HEALTH | Age: 70
End: 2020-07-10
Payer: MEDICARE

## 2020-07-10 VITALS
SYSTOLIC BLOOD PRESSURE: 120 MMHG | DIASTOLIC BLOOD PRESSURE: 56 MMHG | HEART RATE: 86 BPM | TEMPERATURE: 98 F | RESPIRATION RATE: 16 BRPM | OXYGEN SATURATION: 98 %

## 2020-07-10 PROCEDURE — 400013 HH SOC

## 2020-07-10 PROCEDURE — G0151 HHCP-SERV OF PT,EA 15 MIN: HCPCS

## 2020-07-14 ENCOUNTER — HOME CARE VISIT (OUTPATIENT)
Dept: SCHEDULING | Facility: HOME HEALTH | Age: 70
End: 2020-07-14
Payer: MEDICARE

## 2020-07-14 ENCOUNTER — HOME CARE VISIT (OUTPATIENT)
Dept: HOME HEALTH SERVICES | Facility: HOME HEALTH | Age: 70
End: 2020-07-14
Payer: MEDICARE

## 2020-07-14 PROCEDURE — G0151 HHCP-SERV OF PT,EA 15 MIN: HCPCS

## 2020-07-14 PROCEDURE — G0152 HHCP-SERV OF OT,EA 15 MIN: HCPCS

## 2020-07-15 VITALS
DIASTOLIC BLOOD PRESSURE: 50 MMHG | TEMPERATURE: 98.4 F | SYSTOLIC BLOOD PRESSURE: 112 MMHG | HEART RATE: 93 BPM | OXYGEN SATURATION: 99 %

## 2020-07-17 ENCOUNTER — HOME CARE VISIT (OUTPATIENT)
Dept: HOME HEALTH SERVICES | Facility: HOME HEALTH | Age: 70
End: 2020-07-17
Payer: MEDICARE

## 2020-07-17 ENCOUNTER — HOME CARE VISIT (OUTPATIENT)
Dept: SCHEDULING | Facility: HOME HEALTH | Age: 70
End: 2020-07-17
Payer: MEDICARE

## 2020-07-17 PROCEDURE — G0151 HHCP-SERV OF PT,EA 15 MIN: HCPCS

## 2020-07-21 ENCOUNTER — HOME CARE VISIT (OUTPATIENT)
Dept: SCHEDULING | Facility: HOME HEALTH | Age: 70
End: 2020-07-21
Payer: MEDICARE

## 2020-07-21 ENCOUNTER — HOME CARE VISIT (OUTPATIENT)
Dept: HOME HEALTH SERVICES | Facility: HOME HEALTH | Age: 70
End: 2020-07-21
Payer: MEDICARE

## 2020-07-21 VITALS
DIASTOLIC BLOOD PRESSURE: 65 MMHG | RESPIRATION RATE: 18 BRPM | OXYGEN SATURATION: 97 % | SYSTOLIC BLOOD PRESSURE: 120 MMHG | TEMPERATURE: 98.8 F | HEART RATE: 95 BPM

## 2020-07-21 PROCEDURE — G0151 HHCP-SERV OF PT,EA 15 MIN: HCPCS

## 2020-07-23 ENCOUNTER — HOME CARE VISIT (OUTPATIENT)
Dept: SCHEDULING | Facility: HOME HEALTH | Age: 70
End: 2020-07-23
Payer: MEDICARE

## 2020-07-23 ENCOUNTER — HOME CARE VISIT (OUTPATIENT)
Dept: HOME HEALTH SERVICES | Facility: HOME HEALTH | Age: 70
End: 2020-07-23
Payer: MEDICARE

## 2020-07-23 VITALS
SYSTOLIC BLOOD PRESSURE: 140 MMHG | TEMPERATURE: 98.2 F | HEART RATE: 92 BPM | OXYGEN SATURATION: 98 % | DIASTOLIC BLOOD PRESSURE: 60 MMHG | RESPIRATION RATE: 16 BRPM

## 2020-07-23 PROCEDURE — G0151 HHCP-SERV OF PT,EA 15 MIN: HCPCS

## 2020-07-28 ENCOUNTER — HOME CARE VISIT (OUTPATIENT)
Dept: HOME HEALTH SERVICES | Facility: HOME HEALTH | Age: 70
End: 2020-07-28
Payer: MEDICARE

## 2020-07-28 ENCOUNTER — HOME CARE VISIT (OUTPATIENT)
Dept: SCHEDULING | Facility: HOME HEALTH | Age: 70
End: 2020-07-28
Payer: MEDICARE

## 2020-07-28 VITALS
OXYGEN SATURATION: 98 % | RESPIRATION RATE: 14 BRPM | SYSTOLIC BLOOD PRESSURE: 126 MMHG | DIASTOLIC BLOOD PRESSURE: 60 MMHG | HEART RATE: 94 BPM | TEMPERATURE: 98.2 F

## 2020-07-28 PROCEDURE — G0151 HHCP-SERV OF PT,EA 15 MIN: HCPCS

## 2020-07-29 RX ORDER — AMLODIPINE BESYLATE 10 MG/1
TABLET ORAL
Qty: 90 TAB | Refills: 1 | Status: SHIPPED | OUTPATIENT
Start: 2020-07-29 | End: 2021-01-26 | Stop reason: SDUPTHER

## 2020-07-31 ENCOUNTER — HOME CARE VISIT (OUTPATIENT)
Dept: HOME HEALTH SERVICES | Facility: HOME HEALTH | Age: 70
End: 2020-07-31
Payer: MEDICARE

## 2020-07-31 ENCOUNTER — HOME CARE VISIT (OUTPATIENT)
Dept: SCHEDULING | Facility: HOME HEALTH | Age: 70
End: 2020-07-31
Payer: MEDICARE

## 2020-07-31 PROCEDURE — G0151 HHCP-SERV OF PT,EA 15 MIN: HCPCS

## 2020-08-21 RX ORDER — PANTOPRAZOLE SODIUM 40 MG/1
TABLET, DELAYED RELEASE ORAL
Qty: 90 TAB | Refills: 1 | Status: SHIPPED | OUTPATIENT
Start: 2020-08-21 | End: 2021-02-08 | Stop reason: SDUPTHER

## 2020-08-24 RX ORDER — LISINOPRIL 30 MG/1
TABLET ORAL
Qty: 90 TAB | Refills: 1 | Status: SHIPPED | OUTPATIENT
Start: 2020-08-24 | End: 2021-03-09

## 2020-08-26 RX ORDER — MEMANTINE HYDROCHLORIDE 10 MG/1
10 TABLET ORAL 2 TIMES DAILY
Qty: 60 TAB | Refills: 5 | Status: SHIPPED | OUTPATIENT
Start: 2020-08-26 | End: 2020-09-09 | Stop reason: DRUGHIGH

## 2020-09-09 ENCOUNTER — OFFICE VISIT (OUTPATIENT)
Dept: NEUROLOGY | Age: 70
End: 2020-09-09
Payer: MEDICARE

## 2020-09-09 VITALS
WEIGHT: 144.8 LBS | BODY MASS INDEX: 25.66 KG/M2 | OXYGEN SATURATION: 98 % | DIASTOLIC BLOOD PRESSURE: 56 MMHG | HEIGHT: 63 IN | RESPIRATION RATE: 18 BRPM | TEMPERATURE: 97 F | SYSTOLIC BLOOD PRESSURE: 132 MMHG | HEART RATE: 92 BPM

## 2020-09-09 DIAGNOSIS — G30.1 LATE ONSET ALZHEIMER'S DISEASE WITHOUT BEHAVIORAL DISTURBANCE (HCC): Primary | ICD-10-CM

## 2020-09-09 DIAGNOSIS — F02.80 LATE ONSET ALZHEIMER'S DISEASE WITHOUT BEHAVIORAL DISTURBANCE (HCC): Primary | ICD-10-CM

## 2020-09-09 DIAGNOSIS — R44.0 AUDITORY HALLUCINATION: ICD-10-CM

## 2020-09-09 PROCEDURE — 1090F PRES/ABSN URINE INCON ASSESS: CPT | Performed by: PSYCHIATRY & NEUROLOGY

## 2020-09-09 PROCEDURE — 99213 OFFICE O/P EST LOW 20 MIN: CPT | Performed by: PSYCHIATRY & NEUROLOGY

## 2020-09-09 PROCEDURE — G9717 DOC PT DX DEP/BP F/U NT REQ: HCPCS | Performed by: PSYCHIATRY & NEUROLOGY

## 2020-09-09 PROCEDURE — 1101F PT FALLS ASSESS-DOCD LE1/YR: CPT | Performed by: PSYCHIATRY & NEUROLOGY

## 2020-09-09 PROCEDURE — G8427 DOCREV CUR MEDS BY ELIG CLIN: HCPCS | Performed by: PSYCHIATRY & NEUROLOGY

## 2020-09-09 PROCEDURE — G8419 CALC BMI OUT NRM PARAM NOF/U: HCPCS | Performed by: PSYCHIATRY & NEUROLOGY

## 2020-09-09 PROCEDURE — G8400 PT W/DXA NO RESULTS DOC: HCPCS | Performed by: PSYCHIATRY & NEUROLOGY

## 2020-09-09 PROCEDURE — G8536 NO DOC ELDER MAL SCRN: HCPCS | Performed by: PSYCHIATRY & NEUROLOGY

## 2020-09-09 PROCEDURE — G8754 DIAS BP LESS 90: HCPCS | Performed by: PSYCHIATRY & NEUROLOGY

## 2020-09-09 PROCEDURE — G8752 SYS BP LESS 140: HCPCS | Performed by: PSYCHIATRY & NEUROLOGY

## 2020-09-09 PROCEDURE — 3017F COLORECTAL CA SCREEN DOC REV: CPT | Performed by: PSYCHIATRY & NEUROLOGY

## 2020-09-09 RX ORDER — MEMANTINE HYDROCHLORIDE 10 MG/1
TABLET ORAL DAILY
COMMUNITY
End: 2021-02-12

## 2020-09-09 NOTE — PROGRESS NOTES
Chief Complaint   Patient presents with    Dementia     States no longer hearing voices, walking more around the block, has increased \"shaking\",  Difficulty with fine motor mvmt like opening cans, pinching a latch, etc.  Will get sad at times

## 2020-09-09 NOTE — PROGRESS NOTES
[facility] Neurology Clinics and [address] at [facility] Neurology Clinics at [address] [facility], [address] [place] [address] [address] [facility], [address] [place] [address]   [phone]              Chief Complaint   Patient presents with    Dementia     Current Outpatient Medications   Medication Sig Dispense Refill    memantine (NAMENDA) 10 mg tablet Take 1 Tab by mouth two (2) times a day. (Patient taking differently: Take 10 mg by mouth. Take 1/2 tab in the morning and 1 tab qhs) 60 Tab 5    lisinopriL (PRINIVIL, ZESTRIL) 30 mg tablet TAKE 1 TABLET BY MOUTH DAILY 90 Tab 1    pantoprazole (PROTONIX) 40 mg tablet TAKE 1 TABLET BY MOUTH ONCE DAILY 90 Tab 1    lisinopriL (PRINIVIL, ZESTRIL) 10 mg tablet TAKE 1 TABLET BY MOUTH DAILY 30 Tab 5    amLODIPine (NORVASC) 10 mg tablet TAKE 1 TABLET BY MOUTH EVERY DAY 90 Tab 1    insulin lispro (HUMALOG) 100 unit/mL kwikpen       ferrous fumarate 324 mg (106 mg iron) tab Take 324 mg by mouth daily. Take 1 tablet by mouth daily      clopidogreL (PLAVIX) 75 mg tab TAKE 1 TABLET BY MOUTH DAILY 30 Tab 2    QUEtiapine (SEROquel) 50 mg tablet TAKE 1 TABLET BY MOUTH TWICE DAILY (Patient taking differently: Take 50 mg by mouth daily. TAKE 1 TABLET BY MOUTH TWICE DAILY) 60 Tab 2    glucose blood VI test strips (ASCENSIA AUTODISC VI, ONE TOUCH ULTRA TEST VI) strip One Touch Ultra 2 Test strips. Test Blood sugars BID. DX E11.8 50 Strip 6    lancets misc Test Blood Sugars BID. DX E11.8 1 Package 11    sertraline (ZOLOFT) 50 mg tablet TAKE 1/2 TABLET IN THE MORNING AND 1 TABLET AT BEDTIME (Patient taking differently: TAKE 1/2 TABLET IN THE MORNING AND 1 TABLET AT BEDTIME BY MOUTH) 90 Tab 3    insulin detemir U-100 (LEVEMIR FLEXTOUCH) 100 unit/mL (3 mL) inpn 40 Units by SubCUTAneous route nightly.  (Patient taking differently: 42 Units by SubCUTAneous route nightly.) 1 Adjustable Dose Pre-filled Pen Syringe 0    acetaminophen (TYLENOL) 500 mg tablet Take 1,500 mg by mouth two (2) times daily as needed for Pain. Allergies   Allergen Reactions    Morphine Itching     Per patient's daughter, severe itching after Morphine IV. Social History     Tobacco Use    Smoking status: Never Smoker    Smokeless tobacco: Never Used   Substance Use Topics    Alcohol use: Not Currently    Drug use: Not Currently     68-year-old lady returns today in follow-up with her daughter for dementia of the Alzheimer's type. No longer having hallucinations. They have decreased the Seroquel down to 1 tablet daily. She is only getting half a tablet of Namenda in the morning and a full tablet at night because the patient says it makes her feel crazy. Last visit we were going to decrease the Namenda down to 5 mg twice daily. Both she and her daughter think she is doing quite well. She is now bathing herself. She is dressing herself. She has a caregiver with him she walks. She wants to get rid of the caregiver and we discussed that today and I think it would be best for her to keep the caregiver as does her daughter. We discussed social interaction, exercise. No new complaint today. Examination  Visit Vitals  /56 (BP 1 Location: Left arm, BP Patient Position: Sitting)   Pulse 92   Temp 97 °F (36.1 °C)   Resp 18   Ht 5' 3\" (1.6 m)   Wt 65.7 kg (144 lb 12.8 oz)   SpO2 98%   BMI 25.65 kg/m²   Pleasant lady very interactive. She is quite put together today in terms of her dress. She is wearing make-up. She tells me in the news [employer] ([email or web address]) is running for president again and she says the date is September 14, 2020. Follows all commands. No motor focality. No cranial nerve deficit. No ataxia. Steady      Impression/Plan  Dementia the Alzheimer's type actually doing quite well at this point. Hallucinations are stopped. Continue Namenda and Seroquel at the current doses.   Follow-up in 6 months    Raheem Gonsales MD      This note was created using voice recognition software. Despite editing, there may be syntax errors. This note will not be viewable in [address]

## 2020-09-11 RX ORDER — CLOPIDOGREL BISULFATE 75 MG/1
TABLET ORAL
Qty: 30 TAB | Refills: 2 | Status: SHIPPED | OUTPATIENT
Start: 2020-09-11 | End: 2020-12-15 | Stop reason: SDUPTHER

## 2020-10-06 DIAGNOSIS — R44.0 AUDITORY HALLUCINATION: ICD-10-CM

## 2020-10-06 RX ORDER — QUETIAPINE FUMARATE 50 MG/1
50 TABLET, FILM COATED ORAL DAILY
Qty: 30 TAB | Refills: 3 | Status: SHIPPED | OUTPATIENT
Start: 2020-10-06 | End: 2021-06-04

## 2020-11-16 ENCOUNTER — APPOINTMENT (OUTPATIENT)
Dept: GENERAL RADIOLOGY | Age: 70
End: 2020-11-16
Attending: EMERGENCY MEDICINE
Payer: MEDICARE

## 2020-11-16 ENCOUNTER — HOSPITAL ENCOUNTER (EMERGENCY)
Age: 70
Discharge: HOME | End: 2020-11-16
Attending: EMERGENCY MEDICINE
Payer: MEDICARE

## 2020-11-16 ENCOUNTER — APPOINTMENT (OUTPATIENT)
Dept: CT IMAGING | Age: 70
End: 2020-11-16
Attending: EMERGENCY MEDICINE
Payer: MEDICARE

## 2020-11-16 VITALS
WEIGHT: 153 LBS | DIASTOLIC BLOOD PRESSURE: 68 MMHG | TEMPERATURE: 98.4 F | HEART RATE: 94 BPM | RESPIRATION RATE: 17 BRPM | OXYGEN SATURATION: 97 % | HEIGHT: 64 IN | BODY MASS INDEX: 26.12 KG/M2 | SYSTOLIC BLOOD PRESSURE: 138 MMHG

## 2020-11-16 DIAGNOSIS — N30.00 ACUTE CYSTITIS WITHOUT HEMATURIA: Primary | ICD-10-CM

## 2020-11-16 DIAGNOSIS — R41.0 CONFUSION: ICD-10-CM

## 2020-11-16 LAB
ALBUMIN SERPL-MCNC: 3.3 G/DL (ref 3.5–5)
ALBUMIN/GLOB SERPL: 0.7 {RATIO} (ref 1.1–2.2)
ALP SERPL-CCNC: 206 U/L (ref 45–117)
ALT SERPL-CCNC: 60 U/L (ref 12–78)
ANION GAP SERPL CALC-SCNC: 4 MMOL/L (ref 5–15)
APPEARANCE UR: ABNORMAL
AST SERPL-CCNC: 102 U/L (ref 15–37)
BACTERIA URNS QL MICRO: ABNORMAL /HPF
BASOPHILS # BLD: 0.1 K/UL (ref 0–0.1)
BASOPHILS NFR BLD: 1 % (ref 0–1)
BILIRUB SERPL-MCNC: 1.1 MG/DL (ref 0.2–1)
BILIRUB UR QL CFM: NEGATIVE
BUN SERPL-MCNC: 20 MG/DL (ref 6–20)
BUN/CREAT SERPL: 16 (ref 12–20)
CALCIUM SERPL-MCNC: 9.2 MG/DL (ref 8.5–10.1)
CHLORIDE SERPL-SCNC: 107 MMOL/L (ref 97–108)
CO2 SERPL-SCNC: 27 MMOL/L (ref 21–32)
COLOR UR: ABNORMAL
COMMENT, HOLDF: NORMAL
CREAT SERPL-MCNC: 1.25 MG/DL (ref 0.55–1.02)
DIFFERENTIAL METHOD BLD: ABNORMAL
EOSINOPHIL # BLD: 0.1 K/UL (ref 0–0.4)
EOSINOPHIL NFR BLD: 1 % (ref 0–7)
EPITH CASTS URNS QL MICRO: ABNORMAL /LPF
ERYTHROCYTE [DISTWIDTH] IN BLOOD BY AUTOMATED COUNT: 18.6 % (ref 11.5–14.5)
GLOBULIN SER CALC-MCNC: 5 G/DL (ref 2–4)
GLUCOSE SERPL-MCNC: 210 MG/DL (ref 65–100)
GLUCOSE UR STRIP.AUTO-MCNC: NEGATIVE MG/DL
HCT VFR BLD AUTO: 36.9 % (ref 35–47)
HGB BLD-MCNC: 12.1 G/DL (ref 11.5–16)
HGB UR QL STRIP: NEGATIVE
IMM GRANULOCYTES # BLD AUTO: 0 K/UL (ref 0–0.04)
IMM GRANULOCYTES NFR BLD AUTO: 0 % (ref 0–0.5)
KETONES UR QL STRIP.AUTO: ABNORMAL MG/DL
LEUKOCYTE ESTERASE UR QL STRIP.AUTO: ABNORMAL
LYMPHOCYTES # BLD: 2.1 K/UL (ref 0.8–3.5)
LYMPHOCYTES NFR BLD: 36 % (ref 12–49)
MCH RBC QN AUTO: 31 PG (ref 26–34)
MCHC RBC AUTO-ENTMCNC: 32.8 G/DL (ref 30–36.5)
MCV RBC AUTO: 94.6 FL (ref 80–99)
MONOCYTES # BLD: 0.5 K/UL (ref 0–1)
MONOCYTES NFR BLD: 8 % (ref 5–13)
NEUTS SEG # BLD: 3.2 K/UL (ref 1.8–8)
NEUTS SEG NFR BLD: 54 % (ref 32–75)
NITRITE UR QL STRIP.AUTO: NEGATIVE
NRBC # BLD: 0 K/UL (ref 0–0.01)
NRBC BLD-RTO: 0 PER 100 WBC
PH UR STRIP: 6 [PH] (ref 5–8)
PLATELET # BLD AUTO: 165 K/UL (ref 150–400)
PMV BLD AUTO: 10.6 FL (ref 8.9–12.9)
POTASSIUM SERPL-SCNC: 3.7 MMOL/L (ref 3.5–5.1)
PROT SERPL-MCNC: 8.3 G/DL (ref 6.4–8.2)
PROT UR STRIP-MCNC: ABNORMAL MG/DL
RBC # BLD AUTO: 3.9 M/UL (ref 3.8–5.2)
RBC #/AREA URNS HPF: ABNORMAL /HPF (ref 0–5)
SAMPLES BEING HELD,HOLD: NORMAL
SODIUM SERPL-SCNC: 138 MMOL/L (ref 136–145)
SP GR UR REFRACTOMETRY: 1.02 (ref 1–1.03)
UR CULT HOLD, URHOLD: NORMAL
UROBILINOGEN UR QL STRIP.AUTO: 1 EU/DL (ref 0.2–1)
WBC # BLD AUTO: 5.9 K/UL (ref 3.6–11)
WBC URNS QL MICRO: ABNORMAL /HPF (ref 0–4)

## 2020-11-16 PROCEDURE — 87086 URINE CULTURE/COLONY COUNT: CPT

## 2020-11-16 PROCEDURE — 85025 COMPLETE CBC W/AUTO DIFF WBC: CPT

## 2020-11-16 PROCEDURE — 71046 X-RAY EXAM CHEST 2 VIEWS: CPT

## 2020-11-16 PROCEDURE — 81001 URINALYSIS AUTO W/SCOPE: CPT

## 2020-11-16 PROCEDURE — 70450 CT HEAD/BRAIN W/O DYE: CPT

## 2020-11-16 PROCEDURE — 74011250637 HC RX REV CODE- 250/637: Performed by: EMERGENCY MEDICINE

## 2020-11-16 PROCEDURE — 80053 COMPREHEN METABOLIC PANEL: CPT

## 2020-11-16 PROCEDURE — 72125 CT NECK SPINE W/O DYE: CPT

## 2020-11-16 PROCEDURE — 36415 COLL VENOUS BLD VENIPUNCTURE: CPT

## 2020-11-16 PROCEDURE — 99284 EMERGENCY DEPT VISIT MOD MDM: CPT

## 2020-11-16 RX ORDER — CEPHALEXIN 250 MG/1
500 CAPSULE ORAL
Status: COMPLETED | OUTPATIENT
Start: 2020-11-16 | End: 2020-11-16

## 2020-11-16 RX ORDER — CEPHALEXIN 500 MG/1
500 CAPSULE ORAL 3 TIMES DAILY
Qty: 21 CAP | Refills: 0 | Status: SHIPPED | OUTPATIENT
Start: 2020-11-16 | End: 2020-11-23

## 2020-11-16 RX ADMIN — CEPHALEXIN 500 MG: 250 CAPSULE ORAL at 22:23

## 2020-11-17 ENCOUNTER — PATIENT OUTREACH (OUTPATIENT)
Dept: CASE MANAGEMENT | Age: 70
End: 2020-11-17

## 2020-11-17 NOTE — ED TRIAGE NOTES
Patient arrives with complaint of GLF 1 month ago, tripping on the steps of her front porch and landing face down on the porch. Patient states landing on her face, but denies LOC. Patient was not seen at any hospital.    Arrives today with complaint of fatigue, headache, posterior-mid neck pain, small bruise on nose, left sided back pain, and mid-abdominal pain. Patient able to ambulate with cane into triage. Reports taking plavix.      Hx of alzheimer's

## 2020-11-17 NOTE — PROGRESS NOTES
Patient contacted regarding recent discharge and COVID-19 risk. [facility] ED 20 dx-acute cystitis, confusion (fall, fatigue, back pain)     Discussed COVID-19 related testing which was not done at this time. Test results were not done. Patient informed of results, if available? N/a    Spoke to pt's daughter Reginald Mata (hipaa) who stated pt stated she is \"feeling like a human again\" and is taking the Keflex. Daughter will call PCP and neuro for appts. Care Transition Nurse/ Ambulatory Care Manager/ LPN Care Coordinator contacted the daughter Reginald Mata (hipaa) by telephone to perform post discharge assessment. Verified name and  with family as identifiers. Patient has following risk factors of: older adult, HTN. CTN/ACM/LPN reviewed discharge instructions, medical action plan and red flags related to discharge diagnosis. Reviewed and educated them on any new and changed medications related to discharge diagnosis. Advised obtaining a 90-day supply of all daily and as-needed medications. Advance Care Planning:   Does patient have an Advance Directive: currently not on file; education provided     Education provided regarding infection prevention, and signs and symptoms of COVID-19 and when to seek medical attention with family who verbalized understanding. Discussed exposure protocols and quarantine from [address] you at higher risk for severe illness  and given an opportunity for questions and concerns. The family agrees to contact the COVID-19 hotline [phone] or PCP office for questions related to their healthcare. CTN/ACM/LPN provided contact information for future reference. From CDC: Are you at higher risk for severe illness?  Wash your hands often.  Avoid close contact (6 feet, which is about two arm lengths) with people who are sick.  Put distance between yourself and other people if COVID-19 is spreading in your community.    Clean and disinfect frequently touched surfaces.  Avoid all cruise travel and non-essential air travel.  Call your healthcare professional if you have concerns about COVID-19 and your underlying condition or if you are sick. For more information on steps you can take to protect yourself, see CDC's How to Protect Yourself      Patient/family/caregiver given information for GetWell Loop and agrees to enroll yes  Patient's preferred e-mail:  [email or web address]  Patient's preferred phone number: [phone]  Based on Loop alert triggers, patient will be contacted by nurse care manager for worsening symptoms. Pt will be further monitored by COVID Loop Team based on severity of symptoms and risk factors.

## 2020-11-17 NOTE — DISCHARGE INSTRUCTIONS
Follow-up with Dr. Kacey Sanford, neurology     Return to emergency department with any worsening confusion, fevers, or any worsening of symptoms

## 2020-11-17 NOTE — ACP (ADVANCE CARE PLANNING)
Advance Care Planning:   Does patient have an Advance Directive: currently not on file; education provided     Per daughter [place] (hipaa), pt has Alzheimer's and is in and out of confusion and is unable to participate in informed decision making.

## 2020-11-17 NOTE — ED PROVIDER NOTES
Patient is a 78-year-old female with history of prior strokes, hypertension, diabetes, Alzheimer's disease presenting to emergency department with her daughter for evaluation of ground-level fall which occurred 1 month ago. History is aided by patient due to underlying dementia. Patient was walking on wet grass 1 month ago, when she slipped and fell forward face first onto cement ground. Ground-level fall. No loss of consciousness or vomiting. Was not medically evaluated for fall. Per daughter, she was at her baseline mental functioning after the fall until 3 days ago when she began to appear acutely more confused. Patient's daughter notes that she is more forgetful, confused, is eating less, and is now complaining of pain from the fall for the first time. She is complaining of frontal headache, left-sided neck pain, and left side pain. She is ambulatory with a cane which she was using prior to the fall. Patient takes Plavix due to history of CVAs, daughter denies additional blood thinners. She is followed by Dr. Martin Chapman for neurology for her Alzheimer's care. Past Medical History:   Diagnosis Date    Anxiety     Auditory hallucinations     Depression     Diabetes ([facility]     Esophageal reflux     Hypertension     Stroke ([facility] 01/2018       No past surgical history on file. No family history on file.     Social History     Socioeconomic History    Marital status:      Spouse name: Not on file    Number of children: Not on file    Years of education: Not on file    Highest education level: Not on file   Occupational History    Not on file   Social Needs    Financial resource strain: Not on file    Food insecurity     Worry: Not on file     Inability: Not on file    Transportation needs     Medical: Not on file     Non-medical: Not on file   Tobacco Use    Smoking status: Never Smoker    Smokeless tobacco: Never Used   Substance and Sexual Activity    Alcohol use: Not Currently    Drug use: Not Currently    Sexual activity: Not Currently   Lifestyle    Physical activity     Days per week: Not on file     Minutes per session: Not on file    Stress: Not on file   Relationships    Social connections     Talks on phone: Not on file     Gets together: Not on file     Attends [employer] service: Not on file     Active member of club or organization: Not on file     Attends meetings of clubs or organizations: Not on file     Relationship status: Not on file    Intimate partner violence     Fear of current or ex partner: Not on file     Emotionally abused: Not on file     Physically abused: Not on file     Forced sexual activity: Not on file   Other Topics Concern    Not on file   Social History Narrative    Not on file         ALLERGIES: Morphine    Review of Systems   Unable to perform ROS: Dementia   Constitutional: Positive for appetite change. Negative for fever. Eyes: Negative for visual disturbance. Respiratory: Negative for cough and shortness of breath. Cardiovascular: Positive for chest pain. Gastrointestinal: Negative for abdominal pain, diarrhea, nausea and vomiting. Genitourinary: Negative for difficulty urinating. Musculoskeletal: Positive for neck pain. Negative for back pain and gait problem. Skin: Negative for color change. Neurological: Positive for headaches. Negative for dizziness. Psychiatric/Behavioral: Positive for confusion. Vitals:    11/16/20 1929 11/16/20 1933   BP: (!) 145/72    Pulse: 92    Resp: 18    Temp: 98.4 °F (36.9 °C)    SpO2: 98% 98%   Weight: 69.4 kg (153 lb)    Height: 5' 4\" (1.626 m)             Physical Exam  Vitals signs and nursing note reviewed. Constitutional:       General: She is not in acute distress. Appearance: Normal appearance. She is not ill-appearing. HENT:      Head: Normocephalic and atraumatic. Mouth/Throat:      Pharynx: Oropharynx is clear.    Eyes:      Extraocular Movements: Extraocular movements intact. Conjunctiva/sclera: Conjunctivae normal.      Pupils: Pupils are equal, round, and reactive to light. Neck:      Musculoskeletal: Normal range of motion. Normal range of motion. Muscular tenderness (left paraspinal ttp) present. No neck rigidity or spinous process tenderness. Cardiovascular:      Rate and Rhythm: Normal rate and regular rhythm. Pulmonary:      Effort: Pulmonary effort is normal.      Breath sounds: Normal breath sounds. No decreased breath sounds, wheezing, rhonchi or rales. Chest:      Chest wall: Tenderness (left lower rib area ttp) present. No deformity or crepitus. Abdominal:      Palpations: Abdomen is soft. Tenderness: There is no abdominal tenderness. Musculoskeletal: Normal range of motion. Skin:     General: Skin is warm and dry. Neurological:      General: No focal deficit present. Mental Status: She is alert. She is confused. Cranial Nerves: Cranial nerves are intact. Sensory: Sensation is intact. Motor: Motor function is intact. Coordination: Coordination is intact. Gait: Gait is intact. Comments: Pt able to state name, , place, month. When asked year, states it is 5. Psychiatric:         Mood and Affect: Mood normal.          MDM  Number of Diagnoses or Management Options  Acute cystitis without hematuria:   Confusion:   Diagnosis management comments: Patient is alert, afebrile, vitals stable. Oriented to person, place only. Pt ambulatory in ED with assistance of cane, no signs of ataxia or weakness. Patient's daughter is at bedside and aids in history due to underlying Alzheimer's disease. Witnessed mechanical ground-level fall 1 month ago, blow to head, no loss of consciousness. Per daughter, she was at her baseline mental functioning until 2-3 days ago when she began seeming acutely more confused. CT head and neck without acute abnormality, chest x-ray clear. Creatinine at baseline. Lab work unremarkable, no leukocytosis. Acute cystitis on urinalysis, culture pending. Discussed patient with ED attending Vito Reilly DO who has also personally evaluated the patient. Pt given first dose of Keflex and tolerated without vomiting or clinical changes. As patient lives with her daughter and has good at home care, she is currently safe for ED discharge with very strict return precautions return to ED with any worsening confusion, fever, or any other severe symptoms. Patient and her daughter are in agreement with this plan. Will follow up with PCP and neurology. Amount and/or Complexity of Data Reviewed  Clinical lab tests: reviewed  Tests in the radiology section of CPT®: reviewed  Tests in the medicine section of CPT®: reviewed  Obtain history from someone other than the patient: yes (Pt's daugther)  Discuss the patient with other providers: yes (ED attending Dr. Milton Sosa)      ED Course as of Nov 16 2214   Mon Nov 16, 2020 2046 IMPRESSION:   No acute abnormality identified. CT HEAD WO CONT [EH]   2046 IMPRESSION: No acute abnormality   XR CHEST PA AND LATERAL [EH]   2100 IMPRESSION:  1. Anterior posterior fusion changes are stable. Lucency around C5 right screw  is stable. 2. No fracture is identified. There has been no significant change   CT SPINE CERV WO CONT [EH]   2139 Stable at baseline   METABOLIC PANEL, COMPREHENSIVE(!):    Sodium 138   Potassium 3.7   Chloride 107   CO2 27   Anion gap 4(!)   Glucose 210(!)   BUN 20   Creatinine 1.25(!)   BUN/Creatinine ratio 16   GFR est AA 51(!)   GFR est non-AA 42(!)   Calcium 9.2   Bilirubin, total 1.1(!)   ALT 60   (!)   Alk.  phosphatase 206(!)   Protein, total 8.3(!)   Albumin 3.3(!)   Globulin 5.0(!)   A-G Ratio 0.7(!) [EH]   2156 URINALYSIS W/MICROSCOPIC(!):    Color DARK YELLOW   Appearance CLOUDY(!)   Specific gravity 1.024   pH (UA) 6.0   Protein TRACE(!)   Glucose Negative   Ketone TRACE(!)   Blood Negative Urobilinogen 1.0   Nitrites Negative   Leukocyte Esterase LARGE(!)   WBC 10-20   RBC 0-5   Epithelial cells MODERATE(!)   Bacteria 1+(!) [EH]   2206 CBC WITH AUTOMATED DIFF(!):    WBC 5.9   RBC 3.90   HGB 12.1   HCT 36.9   MCV 94.6   MCH 31.0   MCHC 32.8   RDW 18.6(!)   PLATELET 397   MPV 28.7   NRBC 0.0   ABSOLUTE NRBC 0.00   NEUTROPHILS 54   LYMPHOCYTES 36   MONOCYTES 8   EOSINOPHILS 1   BASOPHILS 1   IMMATURE GRANULOCYTES 0   ABS. NEUTROPHILS 3.2   ABS. LYMPHOCYTES 2.1   ABS. MONOCYTES 0.5   ABS. EOSINOPHILS 0.1   ABS. BASOPHILS 0.1   ABS. IMM. GRANS. 0.0   DF AUTOMATED [EH]      ED Course User Index  [EH] FABIAN Sebastian     10:25 PM  Pt has been reevaluated. There are no new complaints, changes, or physical findings at this time. Medications have been reviewed w/ pt and/or family. Pt and/or family's questions have been answered. Pt and/or family expressed good understanding of the dx/tx/rx and is in agreement with plan of care. Pt instructed and agreed to f/u w/ PCP, neurology, and to return to ED upon further deterioration. Pt is ready for discharge. IMPRESSION:  1. Acute cystitis without hematuria    2. Confusion        PLAN:  1. Current Discharge Medication List      START taking these medications    Details   cephALEXin (Keflex) 500 mg capsule Take 1 Cap by mouth three (3) times daily for 7 days. Qty: 21 Cap, Refills: 0           2.    Follow-up Information     Follow up With Specialties Details Why Contact Info    Katheren Cooks, MD Internal Medicine Schedule an appointment as soon as possible for a visit   [address] [phone]      Rickie Rosen MD Neurology Schedule an appointment as soon as possible for a visit   [address] [facility] [address] [phone]      [facility] EMERGENCY DEPT Emergency Medicine Go to  If symptoms worsen [address] [facility]  [phone]            Return to ED if worse Procedures

## 2020-11-18 ENCOUNTER — TELEPHONE (OUTPATIENT)
Dept: NEUROLOGY | Age: 70
End: 2020-11-18

## 2020-11-18 LAB
BACTERIA SPEC CULT: NORMAL
CC UR VC: NORMAL
SERVICE CMNT-IMP: NORMAL

## 2020-11-18 NOTE — TELEPHONE ENCOUNTER
----- Message from Cheri Espinal Page sent at 11/17/2020  3:11 PM EST -----  Regarding: Dr. Teagan Marcus not available    Caller's first and last name and relationship to patient (if not the patient): Alina Nichols- Daughter       Best contact number: [phone]      Preferred date and time: First available      Scheduled appointment date and time: n/a      Reason for appointment: Er Follow up      Details to clarify the request: Patient was seen at the  [facility] ER on 11/16/2020 and was diagnosed with a UTI and advised to follow up with  because of change in behavior      Yojana Chand

## 2020-12-10 ENCOUNTER — OFFICE VISIT (OUTPATIENT)
Dept: NEUROLOGY | Age: 70
End: 2020-12-10
Payer: MEDICARE

## 2020-12-10 VITALS
HEART RATE: 92 BPM | TEMPERATURE: 96.8 F | DIASTOLIC BLOOD PRESSURE: 60 MMHG | OXYGEN SATURATION: 99 % | SYSTOLIC BLOOD PRESSURE: 124 MMHG

## 2020-12-10 DIAGNOSIS — R44.0 AUDITORY HALLUCINATION: ICD-10-CM

## 2020-12-10 DIAGNOSIS — F32.A ANXIETY AND DEPRESSION: ICD-10-CM

## 2020-12-10 DIAGNOSIS — G30.1 LATE ONSET ALZHEIMER'S DISEASE WITHOUT BEHAVIORAL DISTURBANCE (HCC): Primary | ICD-10-CM

## 2020-12-10 DIAGNOSIS — F41.9 ANXIETY AND DEPRESSION: ICD-10-CM

## 2020-12-10 DIAGNOSIS — F02.80 LATE ONSET ALZHEIMER'S DISEASE WITHOUT BEHAVIORAL DISTURBANCE (HCC): Primary | ICD-10-CM

## 2020-12-10 PROCEDURE — G8536 NO DOC ELDER MAL SCRN: HCPCS | Performed by: NURSE PRACTITIONER

## 2020-12-10 PROCEDURE — G9717 DOC PT DX DEP/BP F/U NT REQ: HCPCS | Performed by: NURSE PRACTITIONER

## 2020-12-10 PROCEDURE — G8752 SYS BP LESS 140: HCPCS | Performed by: NURSE PRACTITIONER

## 2020-12-10 PROCEDURE — 1090F PRES/ABSN URINE INCON ASSESS: CPT | Performed by: NURSE PRACTITIONER

## 2020-12-10 PROCEDURE — G8419 CALC BMI OUT NRM PARAM NOF/U: HCPCS | Performed by: NURSE PRACTITIONER

## 2020-12-10 PROCEDURE — G8400 PT W/DXA NO RESULTS DOC: HCPCS | Performed by: NURSE PRACTITIONER

## 2020-12-10 PROCEDURE — 1101F PT FALLS ASSESS-DOCD LE1/YR: CPT | Performed by: NURSE PRACTITIONER

## 2020-12-10 PROCEDURE — 99214 OFFICE O/P EST MOD 30 MIN: CPT | Performed by: NURSE PRACTITIONER

## 2020-12-10 PROCEDURE — G8427 DOCREV CUR MEDS BY ELIG CLIN: HCPCS | Performed by: NURSE PRACTITIONER

## 2020-12-10 PROCEDURE — G8754 DIAS BP LESS 90: HCPCS | Performed by: NURSE PRACTITIONER

## 2020-12-10 PROCEDURE — 3017F COLORECTAL CA SCREEN DOC REV: CPT | Performed by: NURSE PRACTITIONER

## 2020-12-10 RX ORDER — DONEPEZIL HYDROCHLORIDE 5 MG/1
5 TABLET, FILM COATED ORAL DAILY
Qty: 30 TAB | Refills: 0 | Status: SHIPPED | OUTPATIENT
Start: 2020-12-10 | End: 2021-01-05 | Stop reason: SDUPTHER

## 2020-12-10 NOTE — PROGRESS NOTES
[address] [facility],[address]  [address]   [address] [place] [address] [place] [address] APOORVA Lindsay .   [phone] Office   [phone] Fax           Date:  12/10/20     Name:  Mary Manriquez  :  1950  MRN:  [id number]     PCP:  Nadira Morris MD    Chief Complaint   Patient presents with   [address] [place] [address] Dementia     ER follow up        HISTORY OF PRESENT ILLNESS: Follow-up for Alzheimer's dementia after a recent ER visit. Apparently, she was taken to the emergency room due to altered mental status and ground-level fall. The ground-level fall actually occurred about a month prior with only mild bumps and bruises. She did not experience any confusion after the fall. This only started after becoming acutely ill based on ER visit. She was tested for urinary tract infection and started on Keflex. She has no baseline. She was having some hallucinations previously, voices at night. She is now on Seroquel and this is no longer occurring. She continues with Namenda for memory management. She is on a low-dose Zoloft for anxiety. She is independent with all activities of daily living. There is some need for supervision for more complex activities such as cooking. She does not wander. No issues with incontinence. She is forgetful and has poor short-term memory. Long-term memory seems to be fairly intact. There is no worrisome or bothersome behavior. Daughter mentions today that she does seem to have some anxiety and the left hand will sometimes seem to tremor particularly when she is upset or anxious. There also seems to be some mild obsessive-compulsive type behavior and that she seems somewhat stuck on the fact that she had a caregiver at home that is no longer there. Except as noted above, denies  fever, chills, cough. No CP or SOB. No dysuria, loss of bowel or bladder control. No Weight loss. Appetite good. Sleeping well. No sweats.   No edema. No bruising or bleeding. No nausea or vomit. No diarrhea. No frequency, urgency, No depressive sxs. No anxiety. Denies sore throat, nasal congestion, nasal discharge, epistaxis, tinnitus, hearing loss, back pain, muscle pain, or joint pain. Current Outpatient Medications   Medication Sig    donepeziL (ARICEPT) 5 mg tablet Take 1 Tab by mouth daily for 30 days. Then increase to 10mg daily thereafter    QUEtiapine (SEROquel) 50 mg tablet Take 1 Tab by mouth daily.  clopidogreL (PLAVIX) 75 mg tab TAKE 1 TABLET BY MOUTH DAILY    memantine (NAMENDA) 10 mg tablet Take  by mouth daily. Take 1/2 tab every am and 1 tab every pm    lisinopriL (PRINIVIL, ZESTRIL) 30 mg tablet TAKE 1 TABLET BY MOUTH DAILY    pantoprazole (PROTONIX) 40 mg tablet TAKE 1 TABLET BY MOUTH ONCE DAILY    lisinopriL (PRINIVIL, ZESTRIL) 10 mg tablet TAKE 1 TABLET BY MOUTH DAILY    amLODIPine (NORVASC) 10 mg tablet TAKE 1 TABLET BY MOUTH EVERY DAY    insulin lispro (HUMALOG) 100 unit/mL kwikpen     ferrous fumarate 324 mg (106 mg iron) tab Take 324 mg by mouth daily. Take 1 tablet by mouth daily    glucose blood VI test strips (ASCENSIA AUTODISC VI, ONE TOUCH ULTRA TEST VI) strip One Touch Ultra 2 Test strips. Test Blood sugars BID. DX E11.8    lancets misc Test Blood Sugars BID. DX E11.8    sertraline (ZOLOFT) 50 mg tablet TAKE 1/2 TABLET IN THE MORNING AND 1 TABLET AT BEDTIME (Patient taking differently: TAKE 1/2 TABLET IN THE MORNING AND 1 TABLET AT BEDTIME BY MOUTH)    insulin detemir U-100 (LEVEMIR FLEXTOUCH) 100 unit/mL (3 mL) inpn 40 Units by SubCUTAneous route nightly. (Patient taking differently: 42 Units by SubCUTAneous route nightly.)    acetaminophen (TYLENOL) 500 mg tablet Take 1,500 mg by mouth two (2) times daily as needed for Pain. No current facility-administered medications for this visit.       Allergies   Allergen Reactions    Morphine Itching     Per patient's daughter, severe itching after Morphine IV. Past Medical History:   Diagnosis Date    Anxiety     Auditory hallucinations     Depression     Diabetes ([facility]     Esophageal reflux     Hypertension     [facility] 01/2018     History reviewed. No pertinent surgical history. Social History     Socioeconomic History    Marital status:      Spouse name: Not on file    Number of children: Not on file    Years of education: Not on file    Highest education level: Not on file   Occupational History    Not on file   Social Needs    Financial resource strain: Not on file    Food insecurity     Worry: Not on file     Inability: Not on file    Transportation needs     Medical: Not on file     Non-medical: Not on file   Tobacco Use    Smoking status: Never Smoker    Smokeless tobacco: Never Used   Substance and Sexual Activity    Alcohol use: Not Currently    Drug use: Not Currently    Sexual activity: Not Currently   Lifestyle    Physical activity     Days per week: Not on file     Minutes per session: Not on file    Stress: Not on file   Relationships    Social connections     Talks on phone: Not on file     Gets together: Not on file     Attends Hoahaoism service: Not on file     Active member of club or organization: Not on file     Attends meetings of clubs or organizations: Not on file     Relationship status: Not on file    Intimate partner violence     Fear of current or ex partner: Not on file     Emotionally abused: Not on file     Physically abused: Not on file     Forced sexual activity: Not on file   Other Topics Concern    Not on file   Social History Narrative    Not on file     History reviewed. No pertinent family history. PHYSICAL EXAMINATION:    Visit Vitals  /60   Pulse 92   Temp 96.8 °F (36 °C)   SpO2 99%     General:  Well defined, nourished, and groomed individual in no acute distress. Neck: Supple, nontender, no bruits, no pain with resistance to active range of motion. Heart: Regular rate and rhythm, no murmurs, rub, or gallop. Normal S1S2. Lungs:  Clear to auscultation bilaterally with equal chest expansion, no cough, no wheeze  Musculoskeletal:  Extremities revealed no edema and had full range of motion of joints. Psych:  Good mood and bright affect    NEUROLOGICAL EXAMINATION:     Mental Status:   Alert and oriented to person and place only. Repetitive but logical.      Cranial Nerves:  I: smell Not tested   II: visual fields Full to confrontation   II: pupils Equal, round, reactive to light   II: optic disc No papilledema   III,VII: ptosis none   III,IV,VI: extraocular muscles  Full ROM   V: mastication normal   V: facial light touch sensation  normal   VII: facial muscle function   symmetric   VIII: hearing symmetric   IX: soft palate elevation  normal   XI: trapezius strength  5/5   XI: sternocleidomastoid strength 5/5   XI: neck flexion strength  5/5   XII: tongue  midline     Motor Examination: Normal tone, bulk, and strength, 5/5 muscle strength throughout. Coordination:  No resting or intention tremor    Gait and Station:  Steady while walking with her cane. No pronator drift. No muscle wasting or fasiculations noted. Reflexes:  DTRs 2+ throughout. ASSESSMENT AND PLAN    ICD-10-CM ICD-9-CM    1. Late onset Alzheimer's disease without behavioral disturbance (HCC)  G30.1 331.0 donepeziL (ARICEPT) 5 mg tablet    F02.80 294.10    2. Auditory hallucination  R44.0 780.1    3. Anxiety and depression  F41.9 300.00     F32.9 311      Discussed diagnosis, prognosis and progression, discussed AMS when sick. Now at baseline. Discussed dual therapy and will start aricept in addition to the [address] Purpose of potential side effects were discussed and her daughter is verbalized understanding. She will start 5 mg daily for the first 30 days then increase this to 10 mg thereafter.   Discussed OCD tendencies, as she seems somewhat stuck on the fact that she had this caregiver that is no longer employed with family, and anxiety, may be worth increasing Zoloft or switching to Prozac. We will set the family up with the dementia care coordinator program.  Follow-up as already planned    Satnam Almaguer

## 2020-12-18 RX ORDER — CLOPIDOGREL BISULFATE 75 MG/1
TABLET ORAL
Qty: 30 TAB | Refills: 2 | Status: SHIPPED | OUTPATIENT
Start: 2020-12-18 | End: 2021-02-26 | Stop reason: SDUPTHER

## 2021-01-05 DIAGNOSIS — F02.80 LATE ONSET ALZHEIMER'S DISEASE WITHOUT BEHAVIORAL DISTURBANCE (HCC): ICD-10-CM

## 2021-01-05 DIAGNOSIS — G30.1 LATE ONSET ALZHEIMER'S DISEASE WITHOUT BEHAVIORAL DISTURBANCE (HCC): ICD-10-CM

## 2021-01-05 RX ORDER — DONEPEZIL HYDROCHLORIDE 10 MG/1
10 TABLET, FILM COATED ORAL DAILY
Qty: 30 TAB | Refills: 0 | Status: SHIPPED | OUTPATIENT
Start: 2021-01-05 | End: 2021-02-03

## 2021-01-26 RX ORDER — LISINOPRIL 10 MG/1
TABLET ORAL
Qty: 30 TAB | Refills: 5 | Status: SHIPPED | OUTPATIENT
Start: 2021-01-26 | End: 2021-01-26 | Stop reason: SDUPTHER

## 2021-01-27 RX ORDER — AMLODIPINE BESYLATE 10 MG/1
TABLET ORAL
Qty: 90 TAB | Refills: 1 | Status: SHIPPED | OUTPATIENT
Start: 2021-01-27 | End: 2021-02-26 | Stop reason: SDUPTHER

## 2021-01-27 RX ORDER — LISINOPRIL 10 MG/1
TABLET ORAL
Qty: 30 TAB | Refills: 5 | Status: SHIPPED | OUTPATIENT
Start: 2021-01-27 | End: 2022-01-17

## 2021-02-26 ENCOUNTER — OFFICE VISIT (OUTPATIENT)
Dept: INTERNAL MEDICINE CLINIC | Age: 71
End: 2021-02-26
Payer: MEDICARE

## 2021-02-26 VITALS
HEIGHT: 64 IN | HEART RATE: 93 BPM | DIASTOLIC BLOOD PRESSURE: 71 MMHG | BODY MASS INDEX: 26.29 KG/M2 | WEIGHT: 154 LBS | OXYGEN SATURATION: 97 % | TEMPERATURE: 97 F | RESPIRATION RATE: 16 BRPM | SYSTOLIC BLOOD PRESSURE: 123 MMHG

## 2021-02-26 DIAGNOSIS — D50.9 MICROCYTIC ANEMIA: ICD-10-CM

## 2021-02-26 DIAGNOSIS — Z00.00 MEDICARE ANNUAL WELLNESS VISIT, SUBSEQUENT: Primary | ICD-10-CM

## 2021-02-26 DIAGNOSIS — R26.89 POOR BALANCE: ICD-10-CM

## 2021-02-26 DIAGNOSIS — R29.6 FREQUENT FALLS: ICD-10-CM

## 2021-02-26 PROCEDURE — 2022F DILAT RTA XM EVC RTNOPTHY: CPT | Performed by: INTERNAL MEDICINE

## 2021-02-26 PROCEDURE — G8400 PT W/DXA NO RESULTS DOC: HCPCS | Performed by: INTERNAL MEDICINE

## 2021-02-26 PROCEDURE — G8752 SYS BP LESS 140: HCPCS | Performed by: INTERNAL MEDICINE

## 2021-02-26 PROCEDURE — 99214 OFFICE O/P EST MOD 30 MIN: CPT | Performed by: INTERNAL MEDICINE

## 2021-02-26 PROCEDURE — G8536 NO DOC ELDER MAL SCRN: HCPCS | Performed by: INTERNAL MEDICINE

## 2021-02-26 PROCEDURE — G9717 DOC PT DX DEP/BP F/U NT REQ: HCPCS | Performed by: INTERNAL MEDICINE

## 2021-02-26 PROCEDURE — G8754 DIAS BP LESS 90: HCPCS | Performed by: INTERNAL MEDICINE

## 2021-02-26 PROCEDURE — G0439 PPPS, SUBSEQ VISIT: HCPCS | Performed by: INTERNAL MEDICINE

## 2021-02-26 PROCEDURE — G8419 CALC BMI OUT NRM PARAM NOF/U: HCPCS | Performed by: INTERNAL MEDICINE

## 2021-02-26 PROCEDURE — 1101F PT FALLS ASSESS-DOCD LE1/YR: CPT | Performed by: INTERNAL MEDICINE

## 2021-02-26 PROCEDURE — 1090F PRES/ABSN URINE INCON ASSESS: CPT | Performed by: INTERNAL MEDICINE

## 2021-02-26 PROCEDURE — G8427 DOCREV CUR MEDS BY ELIG CLIN: HCPCS | Performed by: INTERNAL MEDICINE

## 2021-02-26 PROCEDURE — 3017F COLORECTAL CA SCREEN DOC REV: CPT | Performed by: INTERNAL MEDICINE

## 2021-02-26 PROCEDURE — 3052F HG A1C>EQUAL 8.0%<EQUAL 9.0%: CPT | Performed by: INTERNAL MEDICINE

## 2021-02-26 NOTE — PROGRESS NOTES
Subjective:      Naya Sanchez is a 65 y.o. female who presents today for   Chief Complaint   Patient presents with   [facility] Annual Wellness Visit     MWV, c/o bilateral ankle swelling,      Ankle swelling-    Dementia - on aricept 10 mg daily and namenda, 10 mg bid   She sees neurologist on a regular basis  zoloft 50 mg qhs    1. Essential hypertension   compliant with antihypertensive  2. KELSIE (acute kidney injury) ([facility] [address]  -     METABOLIC PANEL, BASIC     3. Hyponatremia  -     METABOLIC PANEL, BASIC     4. Microcytic anemia  -     CBC WITH AUTOMATED DIFF     5. Type II diabetes mellitus with complication, uncontrolled (HCC)  -     HEMOGLOBIN A1C W/O EAG  -     REFERRAL TO ENDOCRINOLOGY   will place referral  Takes  35 units of insulin twice daily       Daughter reports that patient is unsteady on her feet and reports frequent falls. Patient has poor balance. Complains of weakness, decreased strength                     Patient Active Problem List    Diagnosis Date Noted    Hyponatremia 03/26/2020    KELSIE (acute kidney injury) ([facility] [address] 03/26/2020    Anxiety 03/26/2020    Depression 03/26/2020    HTN (hypertension) 03/26/2020    GERD (gastroesophageal reflux disease) 03/26/2020    Microcytic anemia 03/26/2020     Current Outpatient Medications   Medication Sig Dispense Refill    memantine (NAMENDA) 10 mg tablet Take 1 Tab by mouth two (2) times a day. 60 Tab 5    pantoprazole (PROTONIX) 40 mg tablet TAKE 1 TABLET BY MOUTH ONCE DAILY 90 Tab 1    donepeziL (ARICEPT) 10 mg tablet TAKE 1 TABLET BY MOUTH DAILY 30 Tab 5    amLODIPine (NORVASC) 10 mg tablet TAKE 1 TABLET BY MOUTH EVERY DAY 90 Tab 1    lisinopriL (PRINIVIL, ZESTRIL) 10 mg tablet TAKE 1 TABLET BY MOUTH DAILY 30 Tab 5    clopidogreL (PLAVIX) 75 mg tab TAKE 1 TABLET BY MOUTH DAILY 30 Tab 2    QUEtiapine (SEROquel) 50 mg tablet Take 1 Tab by mouth daily. 30 Tab 3    lancets misc Test Blood Sugars BID.  DX E11.8 1 Package 11    sertraline (ZOLOFT) 50 mg tablet TAKE 1/2 TABLET IN THE MORNING AND 1 TABLET AT BEDTIME (Patient taking differently: TAKE 1/2 TABLET IN THE MORNING AND 1 TABLET AT BEDTIME BY MOUTH) 90 Tab 3    insulin detemir U-100 (LEVEMIR FLEXTOUCH) 100 unit/mL (3 mL) inpn 40 Units by SubCUTAneous route nightly. (Patient taking differently: 42 Units by SubCUTAneous route nightly.) 1 Adjustable Dose Pre-filled Pen Syringe 0    acetaminophen (TYLENOL) 500 mg tablet Take 1,500 mg by mouth two (2) times daily as needed for Pain.  Blood-Glucose Meter,Continuous (Dexcom G6 ) misc Use as directed to check blood glucose 3 times daily 1 Each 3    Blood-Glucose Sensor (Dexcom G6 Sensor) cali Use as directed to check blood glucose 3 times daily 1 Device 3    Blood-Glucose Transmitter (Dexcom G6 Transmitter) cali Use as directed to check blood glucose 3 times daily  E11.8  E11.65 1 Device 3    lisinopriL (PRINIVIL, ZESTRIL) 30 mg tablet TAKE 1 TABLET BY MOUTH DAILY 90 Tab 1    insulin lispro (HUMALOG) 100 unit/mL kwikpen       ferrous fumarate 324 mg (106 mg iron) tab Take 324 mg by mouth daily. Take 1 tablet by mouth daily      glucose blood VI test strips (ASCENSIA AUTODISC VI, ONE TOUCH ULTRA TEST VI) strip One Touch Ultra 2 Test strips. Test Blood sugars BID. DX E11.8 50 Strip 6     Allergies   Allergen Reactions    Morphine Itching     Per patient's daughter, severe itching after Morphine IV. Past Medical History:   Diagnosis Date    Anxiety     Auditory hallucinations     Depression     Diabetes ([facility] [address]     Esophageal reflux     Hypertension     Stroke ([facility] [address] 01/2018     History reviewed. No pertinent surgical history. History reviewed. No pertinent family history. Social History     Tobacco Use    Smoking status: Never Smoker    Smokeless tobacco: Never Used   Substance Use Topics    Alcohol use: Not Currently        Review of Systems    A comprehensive review of systems was negative except for that written in the HPI. Objective:     Visit Vitals  /71   Pulse 93   Temp 97 °F (36.1 °C) (Temporal)   Resp 16   Ht 5' 4\" (1.626 m)   Wt 154 lb (69.9 kg)   SpO2 97%   BMI 26.43 kg/m²     General:  Alert, cooperative, no distress, appears stated age. Head:  Normocephalic, without obvious abnormality, atraumatic. Eyes:  Conjunctivae/corneas clear. PERRL, EOMs intact. Fundi benign. Ears:  Normal TMs and external ear canals both ears. Nose: Nares normal. Septum midline. Mucosa normal. No drainage or sinus tenderness. Throat: Lips, mucosa, and tongue normal. Teeth and gums normal.   Neck: Supple, symmetrical, trachea midline, no adenopathy, thyroid: no enlargement/tenderness/nodules, no carotid bruit and no JVD. Back:   Symmetric, no curvature. ROM normal. No CVA tenderness. Lungs:   Clear to auscultation bilaterally. Chest wall:  No tenderness or deformity. Heart:  Regular rate and rhythm, S1, S2 normal, no murmur, click, rub or gallop. Abdomen:   Soft, non-tender. Bowel sounds normal. No masses,  No organomegaly. Extremities: Extremities normal, atraumatic, no cyanosis or edema. Pulses: 2+ and symmetric all extremities. Skin: Skin color, texture, turgor normal. No rashes or lesions. Lymph nodes: Cervical, supraclavicular, and axillary nodes normal.   Neurologic: CNII-XII intact. Normal strength, sensation and reflexes throughout. Assessment/Plan:       ICD-10-CM ICD-9-CM    1. Medicare annual wellness visit, subsequent  M87.82 P26.4 METABOLIC PANEL, COMPREHENSIVE      CBC WITH AUTOMATED DIFF      HEMOGLOBIN A1C WITH EAG      MICROALBUMIN, UR, RAND W/ MICROALB/CREAT RATIO   2. Type II diabetes mellitus with complication, uncontrolled ([facility])  W07.3 434.07 METABOLIC PANEL, COMPREHENSIVE    E11.65  HEMOGLOBIN A1C WITH EAG      MICROALBUMIN, UR, RAND W/ MICROALB/CREAT RATIO      REFERRAL TO ENDOCRINOLOGY   3. Microcytic anemia  D50.9 280.9 CBC WITH AUTOMATED DIFF   4.  Poor balance  R26.89 781.99 REFERRAL TO HOME HEALTH   5. Frequent falls  R29.6 V15.88 [address] her to call back or return to office if symptoms worsen/change/persist.  Discussed expected course/resolution/complications of diagnosis in detail with patient. Medication risks/benefits/costs/interactions/alternatives discussed with patient. She was given an after visit summary which includes diagnoses, current medications, & vitals. She expressed understanding with the diagnosis and plan. This is the Subsequent Medicare Annual Wellness Exam, performed 12 months or more after the Initial AWV or the last Subsequent AWV    I have reviewed the patient's medical history in detail and updated the computerized patient record. Depression Risk Factor Screening:     3 most recent PHQ Screens 1/31/2020   Little interest or pleasure in doing things Not at all   Feeling down, depressed, irritable, or hopeless Not at all   Total Score PHQ 2 0       Alcohol Risk Screen    Do you average more than 1 drink per night or more than 7 drinks a week: no    On any one occasion in the past three months have you have had more than 3 drinks containing alcohol: no        Functional Ability and Level of Safety:    Hearing: Hearing is good. Activities of Daily Living: The home contains: no safety equipment. Patient needs help with:  phone, transportation, shopping, preparing meals, laundry, housework, managing medications, managing money, bathing, hygiene and walking      Ambulation: with difficulty      Fall Risk:  Fall Risk Assessment, last 12 mths 2/26/2021   Able to walk? Yes   Fall in past 12 months? 1   Do you feel unsteady? 1   Number of falls in past 12 months 2   Fall with injury?  0      Abuse Screen:  Patient is not abused       Cognitive Screening    Has your family/caregiver stated any concerns about your memory: yes        Assessment/Plan   Education and counseling provided:  Are appropriate based on today's review and evaluation    Diagnoses and all orders for this visit:    1. Medicare annual wellness visit, subsequent  -     METABOLIC PANEL, COMPREHENSIVE; Future  -     CBC WITH AUTOMATED DIFF; Future  -     HEMOGLOBIN A1C WITH EAG; Future  -     MICROALBUMIN, UR, RAND W/ MICROALB/CREAT RATIO; Future    2. Type II diabetes mellitus with complication, uncontrolled (HCC)  -     METABOLIC PANEL, COMPREHENSIVE; Future  -     HEMOGLOBIN A1C WITH EAG; Future  -     MICROALBUMIN, UR, RAND W/ MICROALB/CREAT RATIO; Future  -     REFERRAL TO ENDOCRINOLOGY    3. Microcytic anemia  -     CBC WITH AUTOMATED DIFF; Future    4. Poor balance  -     REFERRAL TO HOME HEALTH    5.  Frequent falls  -     [address]    Other orders  -     CBC WITH AUTOMATED DIFF  -     METABOLIC PANEL, COMPREHENSIVE  -     MICROALBUMIN, UR, RAND W/ MICROALB/CREAT RATIO  -     HEMOGLOBIN A1C WITH EAG        Health Maintenance Due     Health Maintenance Due   Topic Date Due    Hepatitis C Screening  Never done    Eye Exam Retinal or Dilated  Never done    COVID-19 Vaccine (1 of 2) Never done    DTaP/Tdap/Td series (1 - Tdap) Never done    Shingrix Vaccine Age 50> (1 of 2) Never done    Breast Cancer Screen Mammogram  Never done    GLAUCOMA SCREENING Q2Y  Never done    Bone Densitometry (Dexa) Screening  Never done    Medicare Yearly Exam  Never done    Flu Vaccine (1) 09/01/2020    Lipid Screen  12/30/2020    Foot Exam Q1  03/26/2021    Colorectal Cancer Screening Combo  03/27/2021       Patient Care Team   Patient Care Team:  Aleksander Wynn MD as PCP - General (Internal Medicine)  Aleksander Wynn MD as PCP - [facility]  Diana Marquez, PHD as Physician (Psychology)  Sneha Tilley MD as Physician (Neurology)    History     Patient Active Problem List   Diagnosis Code    Hyponatremia E87.1    KELSIE (acute kidney injury) ([facility] [address] N17.9    Anxiety F41.9    Depression F32.9    HTN (hypertension) I10    GERD (gastroesophageal reflux disease) K21.9    Microcytic anemia D50.9     Past Medical History:   Diagnosis Date    Anxiety     Auditory hallucinations     Depression     Diabetes ([facility] [address]     Esophageal reflux     Hypertension     Stroke ([facility] [address] 01/2018      History reviewed. No pertinent surgical history. Current Outpatient Medications   Medication Sig Dispense Refill    lisinopriL (PRINIVIL, ZESTRIL) 10 mg tablet TAKE 1 TABLET BY MOUTH DAILY 30 Tab 5    QUEtiapine (SEROquel) 50 mg tablet Take 1 Tab by mouth daily. 30 Tab 3    lancets misc Test Blood Sugars BID. DX E11.8 1 Package 11    acetaminophen (TYLENOL) 500 mg tablet Take 1,500 mg by mouth two (2) times daily as needed for Pain.  amLODIPine (NORVASC) 10 mg tablet TAKE 1 TABLET BY MOUTH EVERY DAY 90 Tab 1    clopidogreL (PLAVIX) 75 mg tab TAKE 1 TABLET BY MOUTH DAILY 90 Tab 1    donepeziL (ARICEPT) 10 mg tablet TAKE 1 TABLET BY MOUTH DAILY 90 Tab 1    insulin detemir U-100 (LEVEMIR FLEXTOUCH) 100 unit/mL (3 mL) inpn 42 Units by SubCUTAneous route nightly. 9 Adjustable Dose Pre-filled Pen Syringe 1    memantine (NAMENDA) 10 mg tablet Take 1 Tab by mouth two (2) times a day. 180 Tab 1    pantoprazole (PROTONIX) 40 mg tablet TAKE 1 TABLET BY MOUTH ONCE DAILY 90 Tab 1    sertraline (ZOLOFT) 50 mg tablet TAKE 1/2 TABLET IN THE MORNING AND 1 TABLET AT BEDTIME BY MOUTH 90 Tab 3    lisinopriL (PRINIVIL, ZESTRIL) 30 mg tablet TAKE 1 TABLET BY MOUTH DAILY 90 Tab 1    insulin lispro (HUMALOG) 100 unit/mL kwikpen        Allergies   Allergen Reactions    Morphine Itching     Per patient's daughter, severe itching after Morphine IV. History reviewed. No pertinent family history.   Social History     Tobacco Use    Smoking status: Never Smoker    Smokeless tobacco: Never Used   Substance Use Topics    Alcohol use: Not Currently

## 2021-02-26 NOTE — PROGRESS NOTES
Chief Complaint   Patient presents with    Annual Wellness Visit     MWV, c/o bilateral ankle swelling,    has a mole ? Under left eye hurting her. 1. Have you been to the ER, urgent care clinic since your last visit? Yes on Nov. 16, 2020 for a fall. Hospitalized since your last visit? no    2. Have you seen or consulted any other health care providers outside of the [address] [place] [address] [place] since your last visit? Include any pap smears or colon screening. No    Chief Complaint   Patient presents with    Annual Wellness Visit     MWV, c/o bilateral ankle swelling,        Depression Risk Factor Screening:     3 most recent PHQ Screens 1/31/2020   Little interest or pleasure in doing things Not at all   Feeling down, depressed, irritable, or hopeless Not at all   Total Score PHQ 2 0       Functional Ability and Level of Safety:     Activities of Daily Living  ADL Assessment 2/26/2021   Getting from bed to chair No Help Needed   Getting dressed No Help Needed   Bathing or showering No Help Needed   Walk across the room (includes cane/walker) No Help Needed   Using the telphone No Help Needed   Taking your medications Help Needed   Preparing meals No Help Needed   Managing money (expenses/bills) No Help Needed   Moderately strenuous housework (laundry) Help Needed   Shopping for personal items (toiletries/medicines) No Help Needed   Shopping for groceries No Help Needed   Driving Help Needed   Climbing a flight of stairs No Help Needed   Getting to places beyond walking distances No Help Needed       Fall Risk  Fall Risk Assessment, last 12 mths 2/26/2021   Able to walk? Yes   Fall in past 12 months? 1   Do you feel unsteady? 1   Number of falls in past 12 months 2   Fall with injury? 0       Abuse Screen  Abuse Screening Questionnaire 2/26/2021   Do you ever feel afraid of your partner? N   Are you in a relationship with someone who physically or mentally threatens you? N   Is it safe for you to go home?  Claude Lobo Patient Care Team   Patient Care Team:  Sami Martinez MD as PCP - General (Internal Medicine)  Sami Martinez MD as PCP - REHABILITATION [facility] Empaneled Provider  Timi Moon, PHD as Physician (Psychology)  Emma Hoffmann MD as Physician (Neurology)

## 2021-02-28 LAB
ALBUMIN SERPL-MCNC: 4.2 G/DL (ref 3.7–4.7)
ALBUMIN/CREAT UR: 20 MG/G CREAT (ref 0–29)
ALBUMIN/GLOB SERPL: 1 {RATIO} (ref 1.2–2.2)
ALP SERPL-CCNC: 208 IU/L (ref 39–117)
ALT SERPL-CCNC: 54 IU/L (ref 0–32)
AST SERPL-CCNC: 101 IU/L (ref 0–40)
BASOPHILS # BLD AUTO: 0.1 X10E3/UL (ref 0–0.2)
BASOPHILS NFR BLD AUTO: 1 %
BILIRUB SERPL-MCNC: 1.6 MG/DL (ref 0–1.2)
BUN SERPL-MCNC: 19 MG/DL (ref 8–27)
BUN/CREAT SERPL: 16 (ref 12–28)
CALCIUM SERPL-MCNC: 9.9 MG/DL (ref 8.7–10.3)
CHLORIDE SERPL-SCNC: 104 MMOL/L (ref 96–106)
CO2 SERPL-SCNC: 23 MMOL/L (ref 20–29)
CREAT SERPL-MCNC: 1.16 MG/DL (ref 0.57–1)
CREAT UR-MCNC: 381.3 MG/DL
EOSINOPHIL # BLD AUTO: 0.1 X10E3/UL (ref 0–0.4)
EOSINOPHIL NFR BLD AUTO: 1 %
ERYTHROCYTE [DISTWIDTH] IN BLOOD BY AUTOMATED COUNT: 14.1 % (ref 11.7–15.4)
EST. AVERAGE GLUCOSE BLD GHB EST-MCNC: 206 MG/DL
GLOBULIN SER CALC-MCNC: 4.2 G/DL (ref 1.5–4.5)
GLUCOSE SERPL-MCNC: 40 MG/DL (ref 65–99)
HBA1C MFR BLD: 8.8 % (ref 4.8–5.6)
HCT VFR BLD AUTO: 39 % (ref 34–46.6)
HGB BLD-MCNC: 13.3 G/DL (ref 11.1–15.9)
IMM GRANULOCYTES # BLD AUTO: 0 X10E3/UL (ref 0–0.1)
IMM GRANULOCYTES NFR BLD AUTO: 0 %
LYMPHOCYTES # BLD AUTO: 3.2 X10E3/UL (ref 0.7–3.1)
LYMPHOCYTES NFR BLD AUTO: 44 %
MCH RBC QN AUTO: 30.9 PG (ref 26.6–33)
MCHC RBC AUTO-ENTMCNC: 34.1 G/DL (ref 31.5–35.7)
MCV RBC AUTO: 91 FL (ref 79–97)
MICROALBUMIN UR-MCNC: 74.6 UG/ML
MONOCYTES # BLD AUTO: 0.7 X10E3/UL (ref 0.1–0.9)
MONOCYTES NFR BLD AUTO: 10 %
NEUTROPHILS # BLD AUTO: 3.2 X10E3/UL (ref 1.4–7)
NEUTROPHILS NFR BLD AUTO: 44 %
PLATELET # BLD AUTO: 178 X10E3/UL (ref 150–450)
POTASSIUM SERPL-SCNC: 4 MMOL/L (ref 3.5–5.2)
PROT SERPL-MCNC: 8.4 G/DL (ref 6–8.5)
RBC # BLD AUTO: 4.3 X10E6/UL (ref 3.77–5.28)
SODIUM SERPL-SCNC: 142 MMOL/L (ref 134–144)
WBC # BLD AUTO: 7.2 X10E3/UL (ref 3.4–10.8)

## 2021-03-04 NOTE — PATIENT INSTRUCTIONS
Medicare Wellness Visit, Female The best way to live healthy is to have a lifestyle where you eat a well-balanced diet, exercise regularly, limit alcohol use, and quit all forms of tobacco/nicotine, if applicable. Regular preventive services are another way to keep healthy. Preventive services (vaccines, screening tests, monitoring & exams) can help personalize your care plan, which helps you manage your own care. Screening tests can find health problems at the earliest stages, when they are easiest to treat. [facility] follows the current, evidence-based guidelines published by the [facility] Johnathan Del Valle ([facility]) when recommending preventive services for our patients. Because we follow these guidelines, sometimes recommendations change over time as research supports it. (For example, mammograms used to be recommended annually. Even though Medicare will still pay for an annual mammogram, the newer guidelines recommend a mammogram every two years for women of average risk). Of course, you and your doctor may decide to screen more often for some diseases, based on your risk and your co-morbidities (chronic disease you are already diagnosed with). Preventive services for you include: - Medicare offers their members a free annual wellness visit, which is time for you and your primary care provider to discuss and plan for your preventive service needs. Take advantage of this benefit every year! 
-All adults over the age of 72 should receive the recommended pneumonia vaccines. Current USPSTF guidelines recommend a series of two vaccines for the best pneumonia protection.  
-All adults should have a flu vaccine yearly and a tetanus vaccine every 10 years.  
-All adults age 48 and older should receive the shingles vaccines (series of two vaccines). -All adults age 38-68 who are overweight should have a diabetes screening test once every three years. -All adults born between [birth year] and 1967 should be screened once for Hepatitis C. 
-Other screening tests and preventive services for persons with diabetes include: an eye exam to screen for diabetic retinopathy, a kidney function test, a foot exam, and stricter control over your cholesterol.  
-Cardiovascular screening for adults with routine risk involves an electrocardiogram (ECG) at intervals determined by your doctor.  
-Colorectal cancer screenings should be done for adults age 54-65 with no increased risk factors for colorectal cancer. There are a number of acceptable methods of screening for this type of cancer. Each test has its own benefits and drawbacks. Discuss with your doctor what is most appropriate for you during your annual wellness visit. The different tests include: colonoscopy (considered the best screening method), a fecal occult blood test, a fecal DNA test, and sigmoidoscopy. 
 
-A bone mass density test is recommended when a woman turns 65 to screen for osteoporosis. This test is only recommended one time, as a screening. Some providers will use this same test as a disease monitoring tool if you already have osteoporosis. -Breast cancer screenings are recommended every other year for women of normal risk, age 54-69. 
-Cervical cancer screenings for women over age 72 are only recommended with certain risk factors. Here is a list of your current Health Maintenance items (your personalized list of preventive services) with a due date: 
Health Maintenance Due Topic Date Due  
 Hepatitis C Test  Never done  Eye Exam  Never done  COVID-19 Vaccine (1 of 2) Never done  DTaP/Tdap/Td  (1 - Tdap) Never done  Shingles Vaccine (1 of 2) Never done  Mammogram  Never done  Glaucoma Screening   Never done  Bone Mineral Density   Never done  Annual Well Visit  Never done  Yearly Flu Vaccine (1) 09/01/2020  Cholesterol Test   12/30/2020 [address] [place] [address] Diabetic Foot Care  03/26/2021  Colorectal Screening  03/27/2021

## 2021-03-09 ENCOUNTER — OFFICE VISIT (OUTPATIENT)
Dept: NEUROLOGY | Age: 71
End: 2021-03-09
Payer: MEDICARE

## 2021-03-09 VITALS
HEART RATE: 87 BPM | DIASTOLIC BLOOD PRESSURE: 55 MMHG | BODY MASS INDEX: 26.43 KG/M2 | RESPIRATION RATE: 18 BRPM | WEIGHT: 154 LBS | SYSTOLIC BLOOD PRESSURE: 140 MMHG | OXYGEN SATURATION: 98 %

## 2021-03-09 DIAGNOSIS — F41.9 ANXIETY AND DEPRESSION: ICD-10-CM

## 2021-03-09 DIAGNOSIS — F32.A ANXIETY AND DEPRESSION: ICD-10-CM

## 2021-03-09 DIAGNOSIS — G30.1 LATE ONSET ALZHEIMER'S DISEASE WITHOUT BEHAVIORAL DISTURBANCE (HCC): Primary | ICD-10-CM

## 2021-03-09 DIAGNOSIS — F02.80 LATE ONSET ALZHEIMER'S DISEASE WITHOUT BEHAVIORAL DISTURBANCE (HCC): Primary | ICD-10-CM

## 2021-03-09 PROCEDURE — 3017F COLORECTAL CA SCREEN DOC REV: CPT | Performed by: PSYCHIATRY & NEUROLOGY

## 2021-03-09 PROCEDURE — G8753 SYS BP > OR = 140: HCPCS | Performed by: PSYCHIATRY & NEUROLOGY

## 2021-03-09 PROCEDURE — G8419 CALC BMI OUT NRM PARAM NOF/U: HCPCS | Performed by: PSYCHIATRY & NEUROLOGY

## 2021-03-09 PROCEDURE — 1090F PRES/ABSN URINE INCON ASSESS: CPT | Performed by: PSYCHIATRY & NEUROLOGY

## 2021-03-09 PROCEDURE — G8536 NO DOC ELDER MAL SCRN: HCPCS | Performed by: PSYCHIATRY & NEUROLOGY

## 2021-03-09 PROCEDURE — G8400 PT W/DXA NO RESULTS DOC: HCPCS | Performed by: PSYCHIATRY & NEUROLOGY

## 2021-03-09 PROCEDURE — 1101F PT FALLS ASSESS-DOCD LE1/YR: CPT | Performed by: PSYCHIATRY & NEUROLOGY

## 2021-03-09 PROCEDURE — G8754 DIAS BP LESS 90: HCPCS | Performed by: PSYCHIATRY & NEUROLOGY

## 2021-03-09 PROCEDURE — G8427 DOCREV CUR MEDS BY ELIG CLIN: HCPCS | Performed by: PSYCHIATRY & NEUROLOGY

## 2021-03-09 PROCEDURE — G9717 DOC PT DX DEP/BP F/U NT REQ: HCPCS | Performed by: PSYCHIATRY & NEUROLOGY

## 2021-03-09 PROCEDURE — 99214 OFFICE O/P EST MOD 30 MIN: CPT | Performed by: PSYCHIATRY & NEUROLOGY

## 2021-03-09 NOTE — PROGRESS NOTES
[facility] and [address] at [facility] Neurology [facility]ics at [address] [facility], [address] [place] [address] [address] [facility], [address] [place] [address]   [phone]              Chief Complaint   Patient presents with   Valorie Patelo     having a tough time this week with incr confusion and feels shaky. Current Outpatient Medications   Medication Sig Dispense Refill    amLODIPine (NORVASC) 10 mg tablet TAKE 1 TABLET BY MOUTH EVERY DAY 90 Tab 1    clopidogreL (PLAVIX) 75 mg tab TAKE 1 TABLET BY MOUTH DAILY 90 Tab 1    donepeziL (ARICEPT) 10 mg tablet TAKE 1 TABLET BY MOUTH DAILY 90 Tab 1    insulin detemir U-100 (LEVEMIR FLEXTOUCH) 100 unit/mL (3 mL) inpn 42 Units by SubCUTAneous route nightly. 9 Adjustable Dose Pre-filled Pen Syringe 1    memantine (NAMENDA) 10 mg tablet Take 1 Tab by mouth two (2) times a day. 180 Tab 1    pantoprazole (PROTONIX) 40 mg tablet TAKE 1 TABLET BY MOUTH ONCE DAILY 90 Tab 1    sertraline (ZOLOFT) 50 mg tablet TAKE 1/2 TABLET IN THE MORNING AND 1 TABLET AT BEDTIME BY MOUTH 90 Tab 3    lisinopriL (PRINIVIL, ZESTRIL) 10 mg tablet TAKE 1 TABLET BY MOUTH DAILY 30 Tab 5    QUEtiapine (SEROquel) 50 mg tablet Take 1 Tab by mouth daily. 30 Tab 3    lancets misc Test Blood Sugars BID. DX E11.8 1 Package 11    acetaminophen (TYLENOL) 500 mg tablet Take 1,500 mg by mouth two (2) times daily as needed for Pain. Allergies   Allergen Reactions    Morphine Itching     Per patient's daughter, severe itching after Morphine IV. Social History     Tobacco Use    Smoking status: Never Smoker    Smokeless tobacco: Never Used   Substance Use Topics    Alcohol use: Not Currently    Drug use: Not Currently     75-year-old lady returns today for follow-up Alzheimer's type dementia. She is on dual therapy with Namenda and Aricept.   Last saw our nurse practitioner in December and at that time she had had some increases in anxiety and thought was perhaps to increase her Zoloft. Family was to be set up with the dementia care program through the [employer]    Today she is with her daughter and they report she has been trying to be a bit more independent. Her daughter is letting her manage her medicines while supervising. She is also having her make some phone calls and take care of some business but again under supervision. She is not driving. Having increased symptoms of anxiety. Having difficulty with checking her blood sugar etc.    Record review finds visit with Dr. Tammi Mak dated February 26, 2021 for her annual wellness visit noted her dementia as well as essential hypertension, hyponatremia anemia and uncontrolled type 2 diabetes    Laboratory analysis from that same date with hemoglobin I6S of 8.8  Metabolic panel with a glucose of 40 and a creatinine of 1.16. CBC unremarkable    Examination  Visit Vitals  BP (!) 140/55 (BP 1 Location: Right arm, BP Patient Position: Sitting, BP Cuff Size: Large adult)   Pulse 87   Resp 18   Wt 69.9 kg (154 lb)   SpO2 98%   BMI 26.43 kg/m²   Awake alert oriented to her birthday which just past but does not know the exact date. She knows the current president the previous president and the president prior. Discusses the stimulus checks in the news. Follows all commands. No focality      Impression/Plan  Alzheimer's type dementia stable  Anxiety worse  Continue Namenda and Aricept  Discussed increasing Zoloft. She did rather wait  Continue working with Dr. Tammi Mak to get an appropriate blood glucose monitor    Follow with me in 6 months    Brayan Pulido MD        This note was created using voice recognition software. Despite editing, there may be syntax errors.

## 2021-03-09 NOTE — PROGRESS NOTES
Chief Complaint   Patient presents with   Christina Gift     having a tough time this week with incr confusion and feels shaky. Pt states \"I feel like I am going backwards, I was doing so well\". Daughter states pt has been trying to be independent.

## 2021-03-15 ENCOUNTER — HOSPITAL ENCOUNTER (OUTPATIENT)
Dept: PREADMISSION TESTING | Age: 71
Discharge: HOME | End: 2021-03-15
Payer: MEDICARE

## 2021-03-15 LAB — SARS-COV-2, COV2: NORMAL

## 2021-03-15 PROCEDURE — U0003 INFECTIOUS AGENT DETECTION BY NUCLEIC ACID (DNA OR RNA); SEVERE ACUTE RESPIRATORY SYNDROME CORONAVIRUS 2 (SARS-COV-2) (CORONAVIRUS DISEASE [COVID-19]), AMPLIFIED PROBE TECHNIQUE, MAKING USE OF HIGH THROUGHPUT TECHNOLOGIES AS DESCRIBED BY CMS-2020-01-R: HCPCS

## 2021-03-16 LAB — SARS-COV-2, COV2NT: NOT DETECTED

## 2021-03-18 RX ORDER — LANCETS
EACH MISCELLANEOUS
Qty: 100 EACH | Refills: 11 | Status: SHIPPED | OUTPATIENT
Start: 2021-03-18

## 2021-03-18 RX ORDER — BLOOD SUGAR DIAGNOSTIC
STRIP MISCELLANEOUS
Qty: 100 STRIP | Refills: 11 | Status: SHIPPED | OUTPATIENT
Start: 2021-03-18

## 2021-03-18 RX ORDER — BLOOD-GLUCOSE METER
EACH MISCELLANEOUS
Qty: 1 EACH | Refills: 0 | Status: SHIPPED | OUTPATIENT
Start: 2021-03-18

## 2021-03-19 ENCOUNTER — APPOINTMENT (OUTPATIENT)
Dept: ENDOSCOPY | Age: 71
End: 2021-03-19
Attending: INTERNAL MEDICINE
Payer: MEDICARE

## 2021-03-19 ENCOUNTER — ANESTHESIA (OUTPATIENT)
Dept: ENDOSCOPY | Age: 71
End: 2021-03-19
Payer: MEDICARE

## 2021-03-19 ENCOUNTER — ANESTHESIA EVENT (OUTPATIENT)
Dept: ENDOSCOPY | Age: 71
End: 2021-03-19
Payer: MEDICARE

## 2021-03-19 ENCOUNTER — HOSPITAL ENCOUNTER (OUTPATIENT)
Age: 71
Setting detail: OUTPATIENT SURGERY
Discharge: HOME | End: 2021-03-19
Attending: INTERNAL MEDICINE | Admitting: INTERNAL MEDICINE
Payer: MEDICARE

## 2021-03-19 VITALS
BODY MASS INDEX: 28.6 KG/M2 | OXYGEN SATURATION: 97 % | WEIGHT: 161.4 LBS | HEART RATE: 92 BPM | SYSTOLIC BLOOD PRESSURE: 145 MMHG | DIASTOLIC BLOOD PRESSURE: 72 MMHG | TEMPERATURE: 98 F | RESPIRATION RATE: 18 BRPM | HEIGHT: 63 IN

## 2021-03-19 LAB
GLUCOSE BLD STRIP.AUTO-MCNC: 156 MG/DL (ref 65–100)
PERFORMED BY, TECHID: ABNORMAL

## 2021-03-19 PROCEDURE — 76040000019: Performed by: INTERNAL MEDICINE

## 2021-03-19 PROCEDURE — 77030037041 HC FCPS HOT BIOP ENDOSC RAD JAW DISP BSC -B: Performed by: INTERNAL MEDICINE

## 2021-03-19 PROCEDURE — 74011250636 HC RX REV CODE- 250/636: Performed by: NURSE ANESTHETIST, CERTIFIED REGISTERED

## 2021-03-19 PROCEDURE — 82962 GLUCOSE BLOOD TEST: CPT

## 2021-03-19 PROCEDURE — 88305 TISSUE EXAM BY PATHOLOGIST: CPT

## 2021-03-19 PROCEDURE — 76060000031 HC ANESTHESIA FIRST 0.5 HR: Performed by: INTERNAL MEDICINE

## 2021-03-19 RX ORDER — PROPOFOL 10 MG/ML
INJECTION, EMULSION INTRAVENOUS AS NEEDED
Status: DISCONTINUED | OUTPATIENT
Start: 2021-03-19 | End: 2021-03-19 | Stop reason: HOSPADM

## 2021-03-19 RX ORDER — SODIUM CHLORIDE, SODIUM LACTATE, POTASSIUM CHLORIDE, CALCIUM CHLORIDE 600; 310; 30; 20 MG/100ML; MG/100ML; MG/100ML; MG/100ML
INJECTION, SOLUTION INTRAVENOUS
Status: DISCONTINUED | OUTPATIENT
Start: 2021-03-19 | End: 2021-03-19 | Stop reason: HOSPADM

## 2021-03-19 RX ORDER — SODIUM CHLORIDE, SODIUM LACTATE, POTASSIUM CHLORIDE, CALCIUM CHLORIDE 600; 310; 30; 20 MG/100ML; MG/100ML; MG/100ML; MG/100ML
50 INJECTION, SOLUTION INTRAVENOUS CONTINUOUS
Status: DISCONTINUED | OUTPATIENT
Start: 2021-03-19 | End: 2021-03-19 | Stop reason: HOSPADM

## 2021-03-19 RX ADMIN — PROPOFOL 25 MG: 10 INJECTION, EMULSION INTRAVENOUS at 11:55

## 2021-03-19 RX ADMIN — PROPOFOL 25 MG: 10 INJECTION, EMULSION INTRAVENOUS at 11:51

## 2021-03-19 RX ADMIN — PROPOFOL 50 MG: 10 INJECTION, EMULSION INTRAVENOUS at 11:53

## 2021-03-19 RX ADMIN — PROPOFOL 50 MG: 10 INJECTION, EMULSION INTRAVENOUS at 11:49

## 2021-03-19 RX ADMIN — SODIUM CHLORIDE, POTASSIUM CHLORIDE, SODIUM LACTATE AND CALCIUM CHLORIDE: 600; 310; 30; 20 INJECTION, SOLUTION INTRAVENOUS at 11:32

## 2021-03-19 RX ADMIN — PROPOFOL 50 MG: 10 INJECTION, EMULSION INTRAVENOUS at 11:46

## 2021-03-19 NOTE — ANESTHESIA POSTPROCEDURE EVALUATION
Procedure(s): FLEXIBLE SIGMOIDOSCOPY.    total IV anesthesia, general    Anesthesia Post Evaluation      Multimodal analgesia: multimodal analgesia not used between 6 hours prior to anesthesia start to PACU discharge  Patient location during evaluation: bedside (Endoscopy suite)  Patient participation: complete - patient cannot participate  Level of consciousness: sleepy but conscious  Pain score: 0  Pain management: adequate  Airway patency: patent  Anesthetic complications: no  Cardiovascular status: acceptable, blood pressure returned to baseline and hemodynamically stable  Respiratory status: acceptable, nasal cannula, nonlabored ventilation and spontaneous ventilation  Hydration status: acceptable  Comments: This patient remained on the stretcher. The patient was handed off to the endoscopy nursing team.  All questions regarding pre-, intra-, and postoperative care were answered.   Post anesthesia nausea and vomiting:  none      INITIAL Post-op Vital signs:   Vitals Value Taken Time   /62 03/19/21 1159   Temp     Pulse 79 03/19/21 1159   Resp 19 03/19/21 1159   SpO2 100 % 03/19/21 1159

## 2021-03-19 NOTE — ANESTHESIA PREPROCEDURE EVALUATION
Relevant Problems   NEUROLOGY   (+) Depression      CARDIOVASCULAR   (+) HTN (hypertension)      GASTROINTESTINAL   (+) GERD (gastroesophageal reflux disease)      RENAL FAILURE   (+) KELSIE (acute kidney injury) ([facility]      HEMATOLOGY   (+) Microcytic anemia       Anesthetic History   No history of anesthetic complications            Review of Systems / Medical History  Patient summary reviewed, nursing notes reviewed and pertinent labs reviewed    Pulmonary  Within defined limits                 Neuro/Psych       CVA  TIA and psychiatric history     Cardiovascular    Hypertension                   GI/Hepatic/Renal     GERD           Endo/Other    Diabetes: type 2, using insulin         Other Findings   Comments: Medical History  Hypertension  Diabetes ([facility]  [facility]  Esophageal reflux  Depression  Anxiety  Auditory hallucinations  [facility] disease ([facility]  Diverticulitis  Hemorrhoids  GERD (gastroesophageal reflux disease)           Physical Exam    Airway  Mallampati: II  TM Distance: 4 - 6 cm  Neck ROM: normal range of motion   Mouth opening: Normal     Cardiovascular    Rhythm: regular  Rate: normal         Dental  No notable dental hx       Pulmonary  Breath sounds clear to auscultation               Abdominal  GI exam deferred       Other Findings            Anesthetic Plan    ASA: 3  Anesthesia type: total IV anesthesia and general          Induction: Intravenous  Anesthetic plan and risks discussed with: Patient and Family      General anesthesia was prescribed for this patient because by definition it is \"a drug-induced loss of consciousness during which patients are not arousable, even by painful stimulation. \" Sometimes, the ability to independently maintain ventilatory function is often impaired and patients often require assistance in maintaining a patent airway.  Occasionally, positive pressure ventilation may be required because of depressed spontaneous ventilation or drug-induced depression of neuromuscular function. This depth of anesthesia is preferred for endoscopic procedures to facilitate the procedure and for patient safety/quality of care.

## 2021-03-31 ENCOUNTER — VIRTUAL VISIT (OUTPATIENT)
Dept: INTERNAL MEDICINE CLINIC | Age: 71
End: 2021-03-31
Payer: MEDICARE

## 2021-03-31 PROCEDURE — 99443 PR PHYS/QHP TELEPHONE EVALUATION 21-30 MIN: CPT | Performed by: INTERNAL MEDICINE

## 2021-03-31 NOTE — PROGRESS NOTES
Shireen Alvarez is a 66 y.o. female, evaluated via audio-only technology on 3/31/2021 for No chief complaint on file. .    Assessment & Plan:   Diagnoses and all orders for this visit:    1. Type II diabetes mellitus with complication, uncontrolled (HCC)    Other orders  -     insulin nph-regular human rec (HumuLIN 70/30 U-100 KwikPen) 100 unit/mL (70-30) inpn; Inject subcutaneously 45 units at breakfast and 45 units at dinner        12  Subjective:     Patient in today for follow up. She has type 2 diabetes. She sees her endocrinologist in June. Will see Dr. Kailee Ramires says she will cover dexacon if checked 4 times daily  Will send this in as a paper script    Wants to discontinue levemir 39 units daily  Last A1c was 8.8     confirm with pharm humalin 70/30 quickpen   45 units before breakfast  45 units before dinner  Prescribed by Dr. Kalin Dillard  call Dr. Shania Sosa  Prior to Admission medications    Medication Sig Start Date End Date Taking? Authorizing Provider   Blood-Glucose Meter (Accu-Chek Jamila Plus Meter) misc Use to test blood sugar three times a day. Dx.e11.9 3/18/21   Jesus Bae Res, MD   glucose blood VI test strips (Accu-Chek Jamila Plus test strp) strip Use to test blood sugar three times a day. Dx.e11.9 3/18/21   Mala Hernandes MD   lancets (Accu-Chek Softclix Lancets) misc Use to test blood sugar three times a day. Dx.e11.9 3/18/21   Eusebio Syed MD   amLODIPine (NORVASC) 10 mg tablet TAKE 1 TABLET BY MOUTH EVERY DAY 3/1/21   Jesus Bae Res, MD   clopidogreL (PLAVIX) 75 mg tab TAKE 1 TABLET BY MOUTH DAILY 3/1/21   Jesus Bae Res, MD   donepeziL (ARICEPT) 10 mg tablet TAKE 1 TABLET BY MOUTH DAILY 3/1/21   Jesus Bae Res, MD   insulin detemir U-100 (LEVEMIR FLEXTOUCH) 100 unit/mL (3 mL) inpn 42 Units by SubCUTAneous route nightly. 3/1/21   Kathia, Dearl MD Nicky   memantine (NAMENDA) 10 mg tablet Take 1 Tab by mouth two (2) times a day. 3/1/21   Geovanna Bae MD   pantoprazole (PROTONIX) 40 mg tablet TAKE 1 TABLET BY MOUTH ONCE DAILY 3/1/21   Geovanna Bae MD   sertraline (ZOLOFT) 50 mg tablet TAKE 1/2 TABLET IN THE MORNING AND 1 TABLET AT BEDTIME BY MOUTH 3/1/21   Geovanna Bae MD   lisinopriL (PRINIVIL, ZESTRIL) 10 mg tablet TAKE 1 TABLET BY MOUTH DAILY 1/27/21   Geovanna Bae MD   QUEtiapine (SEROquel) 50 mg tablet Take 1 Tab by mouth daily. 10/6/20   ByronLigia MD   lancets misc Test Blood Sugars BID. DX E11.8 4/14/20   Douglas Jacob MD   acetaminophen (TYLENOL) 500 mg tablet Take 1,500 mg by mouth two (2) times daily as needed for Pain. Provider, Historical         ROS    No flowsheet data found. Rose Li, who was evaluated through a patient-initiated, synchronous (real-time) audio only encounter, and/or her healthcare decision maker, is aware that it is a billable service, with coverage as determined by her insurance carrier. She provided verbal consent to proceed: Yes. She has not had a related appointment within my department in the past 7 days or scheduled within the next 24 hours.       Total Time: minutes: 21-30 minutes    Kenya Rae MD

## 2021-04-01 RX ORDER — INSULIN HUMAN 100 [IU]/ML
INJECTION, SUSPENSION SUBCUTANEOUS
Qty: 9 ADJUSTABLE DOSE PRE-FILLED PEN SYRINGE | Refills: 3 | Status: SHIPPED | OUTPATIENT
Start: 2021-04-01 | End: 2021-04-09 | Stop reason: SDUPTHER

## 2021-04-09 RX ORDER — INSULIN HUMAN 100 [IU]/ML
INJECTION, SUSPENSION SUBCUTANEOUS
Qty: 9 ADJUSTABLE DOSE PRE-FILLED PEN SYRINGE | Refills: 3 | Status: SHIPPED | OUTPATIENT
Start: 2021-04-09 | End: 2021-07-20 | Stop reason: SDUPTHER

## 2021-05-20 PROBLEM — E11.21 MICROALBUMINURIC DIABETIC NEPHROPATHY (HCC): Status: ACTIVE | Noted: 2017-12-16

## 2021-05-20 PROBLEM — E78.2 MIXED HYPERLIPIDEMIA: Status: ACTIVE | Noted: 2017-12-16

## 2021-05-20 PROBLEM — J30.9 ALLERGIC RHINITIS: Status: ACTIVE | Noted: 2017-12-16

## 2021-05-20 PROBLEM — I67.2 CEREBRAL ATHEROSCLEROSIS: Status: ACTIVE | Noted: 2018-08-27

## 2021-05-20 PROBLEM — K74.5 BILIARY CIRRHOSIS (HCC): Status: ACTIVE | Noted: 2017-12-16

## 2021-05-20 PROBLEM — F43.22 ADJUSTMENT DISORDER WITH ANXIOUS MOOD: Status: ACTIVE | Noted: 2017-12-16

## 2021-05-20 PROBLEM — K74.60 CIRRHOSIS OF LIVER (HCC): Status: ACTIVE | Noted: 2018-05-21

## 2021-05-20 PROBLEM — M54.16 LUMBAR RADICULOPATHY: Status: ACTIVE | Noted: 2017-12-16

## 2021-05-25 ENCOUNTER — TELEPHONE (OUTPATIENT)
Dept: INTERNAL MEDICINE CLINIC | Age: 71
End: 2021-05-25

## 2021-05-25 NOTE — TELEPHONE ENCOUNTER
----- Message from Kim Enriquez sent at 5/25/2021  1:14 PM EDT -----  Regarding: Dr. Bhavesh Hernandez Message/Vendor Calls    Caller's first and last name:Agent from Replaced by [facility]      Reason for call:status of clinical notes faxed over on 5/10/2021  Callback required yes/no and why:yes      Best contact number(s):[phone]      Details to clarify the request:status of clinical notes requested from 5/10/2021  Kim Enriquez

## 2021-05-25 NOTE — TELEPHONE ENCOUNTER
Contacted [employer], made them aware that we did not see the patient on 5/10 so we dont have any noted to send. Representative said that he would notate the account.

## 2021-05-28 DIAGNOSIS — D50.9 MICROCYTIC ANEMIA: ICD-10-CM

## 2021-05-28 DIAGNOSIS — Z00.00 MEDICARE ANNUAL WELLNESS VISIT, SUBSEQUENT: ICD-10-CM

## 2021-05-28 LAB
ALBUMIN SERPL-MCNC: 3.5 G/DL (ref 3.5–5)
ALBUMIN/GLOB SERPL: 0.8 {RATIO} (ref 1.1–2.2)
ALP SERPL-CCNC: 242 U/L (ref 45–117)
ALT SERPL-CCNC: 70 U/L (ref 12–78)
ANION GAP SERPL CALC-SCNC: 8 MMOL/L (ref 5–15)
AST SERPL-CCNC: 92 U/L (ref 15–37)
BASOPHILS # BLD: 0 K/UL (ref 0–0.1)
BASOPHILS NFR BLD: 1 % (ref 0–1)
BILIRUB SERPL-MCNC: 1.3 MG/DL (ref 0.2–1)
BUN SERPL-MCNC: 15 MG/DL (ref 6–20)
BUN/CREAT SERPL: 16 (ref 12–20)
CALCIUM SERPL-MCNC: 9.1 MG/DL (ref 8.5–10.1)
CHLORIDE SERPL-SCNC: 102 MMOL/L (ref 97–108)
CO2 SERPL-SCNC: 26 MMOL/L (ref 21–32)
CREAT SERPL-MCNC: 0.96 MG/DL (ref 0.55–1.02)
CREAT UR-MCNC: 157 MG/DL
DIFFERENTIAL METHOD BLD: ABNORMAL
EOSINOPHIL # BLD: 0.1 K/UL (ref 0–0.4)
EOSINOPHIL NFR BLD: 1 % (ref 0–7)
ERYTHROCYTE [DISTWIDTH] IN BLOOD BY AUTOMATED COUNT: 14.2 % (ref 11.5–14.5)
EST. AVERAGE GLUCOSE BLD GHB EST-MCNC: 194 MG/DL
GLOBULIN SER CALC-MCNC: 4.5 G/DL (ref 2–4)
GLUCOSE SERPL-MCNC: 310 MG/DL (ref 65–100)
HBA1C MFR BLD: 8.4 % (ref 4–5.6)
HCT VFR BLD AUTO: 35.9 % (ref 35–47)
HGB BLD-MCNC: 11.2 G/DL (ref 11.5–16)
IMM GRANULOCYTES # BLD AUTO: 0 K/UL (ref 0–0.04)
IMM GRANULOCYTES NFR BLD AUTO: 0 % (ref 0–0.5)
LYMPHOCYTES # BLD: 2.1 K/UL (ref 0.8–3.5)
LYMPHOCYTES NFR BLD: 44 % (ref 12–49)
MCH RBC QN AUTO: 30.1 PG (ref 26–34)
MCHC RBC AUTO-ENTMCNC: 31.2 G/DL (ref 30–36.5)
MCV RBC AUTO: 96.5 FL (ref 80–99)
MICROALBUMIN UR-MCNC: 29.3 MG/DL
MICROALBUMIN/CREAT UR-RTO: 187 MG/G (ref 0–30)
MONOCYTES # BLD: 0.4 K/UL (ref 0–1)
MONOCYTES NFR BLD: 10 % (ref 5–13)
NEUTS SEG # BLD: 2.1 K/UL (ref 1.8–8)
NEUTS SEG NFR BLD: 44 % (ref 32–75)
NRBC # BLD: 0 K/UL (ref 0–0.01)
NRBC BLD-RTO: 0 PER 100 WBC
PLATELET # BLD AUTO: 149 K/UL (ref 150–400)
PMV BLD AUTO: 12.3 FL (ref 8.9–12.9)
POTASSIUM SERPL-SCNC: 3.9 MMOL/L (ref 3.5–5.1)
PROT SERPL-MCNC: 8 G/DL (ref 6.4–8.2)
RBC # BLD AUTO: 3.72 M/UL (ref 3.8–5.2)
SODIUM SERPL-SCNC: 136 MMOL/L (ref 136–145)
WBC # BLD AUTO: 4.6 K/UL (ref 3.6–11)

## 2021-06-01 NOTE — PROGRESS NOTES
A1c is 8.4 and her blood sugar 310.   A1c shows some improvement but still not at goal of <7  She has an appt this week with endocrinologist and needs to be sure to keep that appt    Slight decrease in her blood counts- showing anemia    Microalbumin urine test is abnormal and she will need to see the kidney specialist. I placed a referral to Dr. Kramer Persons    Liver tests are abnormal

## 2021-06-04 ENCOUNTER — OFFICE VISIT (OUTPATIENT)
Dept: ENDOCRINOLOGY | Age: 71
End: 2021-06-04
Payer: MEDICARE

## 2021-06-04 VITALS
WEIGHT: 161 LBS | DIASTOLIC BLOOD PRESSURE: 55 MMHG | SYSTOLIC BLOOD PRESSURE: 129 MMHG | TEMPERATURE: 98.3 F | HEIGHT: 63 IN | OXYGEN SATURATION: 99 % | HEART RATE: 75 BPM | BODY MASS INDEX: 28.53 KG/M2

## 2021-06-04 DIAGNOSIS — E11.65 TYPE 2 DIABETES MELLITUS WITH HYPERGLYCEMIA, UNSPECIFIED WHETHER LONG TERM INSULIN USE (HCC): Primary | ICD-10-CM

## 2021-06-04 DIAGNOSIS — E78.2 MIXED HYPERLIPIDEMIA: ICD-10-CM

## 2021-06-04 DIAGNOSIS — I10 ESSENTIAL HYPERTENSION: ICD-10-CM

## 2021-06-04 PROCEDURE — 99205 OFFICE O/P NEW HI 60 MIN: CPT | Performed by: INTERNAL MEDICINE

## 2021-06-04 PROCEDURE — G8419 CALC BMI OUT NRM PARAM NOF/U: HCPCS | Performed by: INTERNAL MEDICINE

## 2021-06-04 PROCEDURE — 1090F PRES/ABSN URINE INCON ASSESS: CPT | Performed by: INTERNAL MEDICINE

## 2021-06-04 PROCEDURE — G8400 PT W/DXA NO RESULTS DOC: HCPCS | Performed by: INTERNAL MEDICINE

## 2021-06-04 PROCEDURE — 3052F HG A1C>EQUAL 8.0%<EQUAL 9.0%: CPT | Performed by: INTERNAL MEDICINE

## 2021-06-04 PROCEDURE — 2022F DILAT RTA XM EVC RTNOPTHY: CPT | Performed by: INTERNAL MEDICINE

## 2021-06-04 PROCEDURE — 1101F PT FALLS ASSESS-DOCD LE1/YR: CPT | Performed by: INTERNAL MEDICINE

## 2021-06-04 PROCEDURE — G8536 NO DOC ELDER MAL SCRN: HCPCS | Performed by: INTERNAL MEDICINE

## 2021-06-04 PROCEDURE — G8427 DOCREV CUR MEDS BY ELIG CLIN: HCPCS | Performed by: INTERNAL MEDICINE

## 2021-06-04 PROCEDURE — G8754 DIAS BP LESS 90: HCPCS | Performed by: INTERNAL MEDICINE

## 2021-06-04 PROCEDURE — 3017F COLORECTAL CA SCREEN DOC REV: CPT | Performed by: INTERNAL MEDICINE

## 2021-06-04 PROCEDURE — G8752 SYS BP LESS 140: HCPCS | Performed by: INTERNAL MEDICINE

## 2021-06-04 PROCEDURE — G9717 DOC PT DX DEP/BP F/U NT REQ: HCPCS | Performed by: INTERNAL MEDICINE

## 2021-06-04 NOTE — PROGRESS NOTES
Leonie Wing MD          Patient Information Name : Chanel Zimmer 66 y.o.   YOB: 1950         Referred by: Magdalene Khan MD         Chief Complaint   Patient presents with    Diabetes       History of Present Illness: Chanel Zimmer is a 66 y.o. female here for initial visit of  Diabetes Mellitus. Diabetes mellitus was diagnosed in 1998. Monitoring frequency:4 /day and readings run breakfast < 90, predinner in 200s  By she is on premixed insulin but taking the evening dose at bedtime, reports hypoglycemia in the morning  She could not tolerate Metformin in the past, daughter is helping but wants to be independent. She did not bring the meter or the logbook to this visit. Used to have freestyle curry but with insurance changed she is not able to get it. Interested in having one.     Used to have sugars in 400s before,  No history of pancreatitis  Eating more fruits during the daytime, no sodas but drinking juice    Weight trend: fluctuating a bit  No known retinopathy    No chest pain,blurred vision  No history of pancreatitis    Wt Readings from Last 3 Encounters:   06/04/21 161 lb (73 kg)   03/11/21 161 lb 6.4 oz (73.2 kg)   03/09/21 154 lb (69.9 kg)       BP Readings from Last 3 Encounters:   06/04/21 (!) 129/55   03/19/21 (!) 145/72   03/09/21 (!) 140/55           Past Medical History:   Diagnosis Date    Alzheimer disease ([facility]     Anxiety     Auditory hallucinations     Depression     Diabetes ([facility]     Diverticulitis     Esophageal reflux     GERD (gastroesophageal reflux disease)     Hemorrhoids     internal and external    Hypertension     Stroke ([facility] 01/2018     Current Outpatient Medications   Medication Sig    insulin nph-regular human rec (HumuLIN 70/30 U-100 KwikPen) 100 unit/mL (70-30) inpn Inject subcutaneously 45 units at breakfast and 45 units at dinner    Blood-Glucose Meter (Accu-Chek Jamila Plus Meter) misc Use to test blood sugar three times a day. Dx.e11.9    glucose blood VI test strips (Accu-Chek Jamila Plus test strp) strip Use to test blood sugar three times a day. Dx.e11.9    lancets (Accu-Chek Softclix Lancets) misc Use to test blood sugar three times a day. Dx.e11.9    amLODIPine (NORVASC) 10 mg tablet TAKE 1 TABLET BY MOUTH EVERY DAY    clopidogreL (PLAVIX) 75 mg tab TAKE 1 TABLET BY MOUTH DAILY    pantoprazole (PROTONIX) 40 mg tablet TAKE 1 TABLET BY MOUTH ONCE DAILY    lisinopriL (PRINIVIL, ZESTRIL) 10 mg tablet TAKE 1 TABLET BY MOUTH DAILY    lancets misc Test Blood Sugars BID. DX E11.8    acetaminophen (TYLENOL) 500 mg tablet Take 1,500 mg by mouth two (2) times daily as needed for Pain. No current facility-administered medications for this visit. Allergies   Allergen Reactions    Morphine Itching     Per patient's daughter, severe itching after Morphine IV. Review of Systems:  Per HPI    Physical Examination:   Blood pressure (!) 129/55, pulse 75, temperature 98.3 °F (36.8 °C), height 5' 3\" (1.6 m), weight 161 lb (73 kg), SpO2 99 %. Estimated body mass index is 28.52 kg/m² as calculated from the following:    Height as of this encounter: 5' 3\" (1.6 m). -   Weight as of this encounter: 161 lb (73 kg).   - General: pleasant, no distress, good eye contact  - HEENT: no pallor, no periorbital edema, EOMI  - Neck: supple, no thyromegaly,  - Cardiovascular: regular,  normal S1 and S2,   - Respiratory: clear to auscultation bilaterally  - Gastrointestinal: soft, nontender, nondistended,  BS +  - Musculoskeletal: no edema  - Neurological: alert and oriented  -           Data Reviewed:        [] Reviewed labs    Lab Results   Component Value Date/Time    Hemoglobin A1c 8.4 (H) 05/28/2021 09:15 AM    Hemoglobin A1c 8.8 (H) 02/26/2021 01:39 PM    Hemoglobin A1c 10.8 (H) 03/26/2020 11:14 AM    Glucose 310 (H) 05/28/2021 09:15 AM    Glucose (POC) 156 (H) 03/19/2021 11:13 AM Microalbumin/Creat ratio (mg/g creat) 187 (H) 05/28/2021 09:15 AM    Microalbumin,urine random 29.30 05/28/2021 09:15 AM    LDL, calculated 70 12/30/2019 12:00 AM    Creatinine 0.96 05/28/2021 09:15 AM          Assessment/Plan:     1. Type 2 diabetes mellitus with hyperglycemia, unspecified whether long term insulin use ([facility]    2. Essential hypertension    3. Mixed hyperlipidemia        1. Type 2 Diabetes Mellitus   Lab Results   Component Value Date/Time    Hemoglobin A1c 8.4 (H) 05/28/2021 09:15 AM    Hemoglobin A1c (POC) 11.6 11/18/2019 04:40 PM     Uncontrolled diabetes mellitus, and has several comorbidities. She is on premixed insulin which is convenient but puts her at higher risk for hypoglycemia which was discussed with the patient. Premixed insulin to be taken before breakfast and before dinner not at bedtime  Humulin 70/30 45 units before breakfast, 35 units before dinner, insulin adjusted, add Januvia. She could not tolerate Metformin  She would like to have freestyle curry, discussed the criteria  Check blood glucose 3-4 times daily      Diabetic issues reviewed : glycemic goals , written exchange diet given, low carbohydrate diet, weight control , home glucose monitoring emphasized,  hypoglycemia management and long term diabetic complications discussed. 2. HTN : Continue current therapy     3. Hyperlipidemia : Will benefit from statin, she is on statin but does not remember the name    4. CVA:        Spent > 60 minutes on the day of the visit reviewing chart, examining, ordering/reviewing labs, counseling, discussing therapeutics and documentation in the medical record        Patient Instructions   Humulin 70/30 45 units before breakfast and 35 units before dinner     Januvia in AM         Follow-up and Dispositions    · Return in about 4 months (around 10/4/2021) for fasting labs. Thank you for allowing me to participate in the care of this patient.     Kahlil Le MD      Patient verbalized understanding     Voice-recognition software was used to generate this report, which may result in some phonetic-based errors in the grammar and contents. Even though attempts were made to correct all the mistakes, some may have been missed and remained in the body of the report.

## 2021-06-04 NOTE — LETTER
6/4/2021 Patient: Jennifer Baez YOB: 1950 Date of Visit: 6/4/2021 Queta Godfrey MD 
[address] [place] [address],[address] [place] [id number] [address] [employer] Dear Queta Godfrey MD, Thank you for referring Ms. Cinthya Rodriguez to [id number] [place] [address] for evaluation. My notes for this consultation are attached. If you have questions, please do not hesitate to call me. I look forward to following your patient along with you. Sincerely, Lizette Warner MD

## 2021-06-07 DIAGNOSIS — E11.65 TYPE 2 DIABETES MELLITUS WITH HYPERGLYCEMIA, UNSPECIFIED WHETHER LONG TERM INSULIN USE (HCC): ICD-10-CM

## 2021-06-07 RX ORDER — FLASH GLUCOSE SENSOR
KIT MISCELLANEOUS
Qty: 6 KIT | Refills: 3 | Status: SHIPPED | OUTPATIENT
Start: 2021-06-07

## 2021-06-08 ENCOUNTER — TELEPHONE (OUTPATIENT)
Dept: NEUROLOGY | Age: 71
End: 2021-06-08

## 2021-06-08 NOTE — TELEPHONE ENCOUNTER
S/w pt's daughter. Pt is refusing meds, states she had an epiphany during her visit to [place] recently. Having more crying spells, interrupted sleep, etc.  Scheduled appt for 6/14/21, msg sent to [facility] for Brianna Olivo with [id number] [facility] to call and set up appt for resources, discussed possible behavioral health referral.  She would like us to discuss this with pt feeling pt me more receptive if this is coming from us.

## 2021-06-08 NOTE — TELEPHONE ENCOUNTER
----- Message from [employer] sent at 6/8/2021 10:58 AM EDT -----  Regarding: /telephone  General Message/Vendor Calls    Caller's first and last name: Juana Haney, the pt's daughter. Reason for call: The pt's daughter stated the pt has refused to take her medications for dementia. She would like to know can she get the pt in for a sooner apt, or can advice be given.       Callback required yes/no and why:Yes      Best contact number(s):[phone]

## 2021-06-09 ENCOUNTER — TELEPHONE (OUTPATIENT)
Dept: NEUROLOGY | Age: 71
End: 2021-06-09

## 2021-06-15 ENCOUNTER — OFFICE VISIT (OUTPATIENT)
Dept: NEUROLOGY | Age: 71
End: 2021-06-15
Payer: MEDICARE

## 2021-06-15 VITALS
HEART RATE: 108 BPM | HEIGHT: 63 IN | WEIGHT: 161 LBS | DIASTOLIC BLOOD PRESSURE: 57 MMHG | SYSTOLIC BLOOD PRESSURE: 148 MMHG | OXYGEN SATURATION: 99 % | RESPIRATION RATE: 16 BRPM | BODY MASS INDEX: 28.53 KG/M2

## 2021-06-15 DIAGNOSIS — F02.80 LATE ONSET ALZHEIMER'S DISEASE WITHOUT BEHAVIORAL DISTURBANCE (HCC): Primary | ICD-10-CM

## 2021-06-15 DIAGNOSIS — G30.1 LATE ONSET ALZHEIMER'S DISEASE WITHOUT BEHAVIORAL DISTURBANCE (HCC): Primary | ICD-10-CM

## 2021-06-15 PROCEDURE — G8427 DOCREV CUR MEDS BY ELIG CLIN: HCPCS | Performed by: PSYCHIATRY & NEUROLOGY

## 2021-06-15 PROCEDURE — 1101F PT FALLS ASSESS-DOCD LE1/YR: CPT | Performed by: PSYCHIATRY & NEUROLOGY

## 2021-06-15 PROCEDURE — G8753 SYS BP > OR = 140: HCPCS | Performed by: PSYCHIATRY & NEUROLOGY

## 2021-06-15 PROCEDURE — 1090F PRES/ABSN URINE INCON ASSESS: CPT | Performed by: PSYCHIATRY & NEUROLOGY

## 2021-06-15 PROCEDURE — G8419 CALC BMI OUT NRM PARAM NOF/U: HCPCS | Performed by: PSYCHIATRY & NEUROLOGY

## 2021-06-15 PROCEDURE — 3017F COLORECTAL CA SCREEN DOC REV: CPT | Performed by: PSYCHIATRY & NEUROLOGY

## 2021-06-15 PROCEDURE — G8536 NO DOC ELDER MAL SCRN: HCPCS | Performed by: PSYCHIATRY & NEUROLOGY

## 2021-06-15 PROCEDURE — G9717 DOC PT DX DEP/BP F/U NT REQ: HCPCS | Performed by: PSYCHIATRY & NEUROLOGY

## 2021-06-15 PROCEDURE — 99214 OFFICE O/P EST MOD 30 MIN: CPT | Performed by: PSYCHIATRY & NEUROLOGY

## 2021-06-15 PROCEDURE — G8754 DIAS BP LESS 90: HCPCS | Performed by: PSYCHIATRY & NEUROLOGY

## 2021-06-15 PROCEDURE — G8400 PT W/DXA NO RESULTS DOC: HCPCS | Performed by: PSYCHIATRY & NEUROLOGY

## 2021-06-15 NOTE — PROGRESS NOTES
[facility] and [address] at [facility] at [address] [place] [address], [address] [facility] [address] [facility], [address] [place] [address]   [phone]              Chief Complaint   Patient presents with   Pedro Gonzalez     has had two nephews die very recently which has made her very sad. Current Outpatient Medications   Medication Sig Dispense Refill    SITagliptin (JANUVIA) 100 mg tablet Take 1 Tablet by mouth daily. 90 Tablet 3    insulin nph-regular human rec (HumuLIN 70/30 U-100 KwikPen) 100 unit/mL (70-30) inpn Inject subcutaneously 45 units at breakfast and 45 units at dinner 9 Adjustable Dose Pre-filled Pen Syringe 3    lancets (Accu-Chek Softclix Lancets) misc Use to test blood sugar three times a day. Dx.e11.9 100 Each 11    amLODIPine (NORVASC) 10 mg tablet TAKE 1 TABLET BY MOUTH EVERY DAY 90 Tab 1    clopidogreL (PLAVIX) 75 mg tab TAKE 1 TABLET BY MOUTH DAILY 90 Tab 1    pantoprazole (PROTONIX) 40 mg tablet TAKE 1 TABLET BY MOUTH ONCE DAILY 90 Tab 1    lisinopriL (PRINIVIL, ZESTRIL) 10 mg tablet TAKE 1 TABLET BY MOUTH DAILY 30 Tab 5    lancets misc Test Blood Sugars BID. DX E11.8 1 Package 11    flash glucose sensor (FreeStyle Luis A 14 Day Sensor) kit Use to check BG as directed. Dx code E11.65 6 Kit 3    Blood-Glucose Meter (Accu-Chek Jamila Plus Meter) misc Use to test blood sugar three times a day. Dx.e11.9 1 Each 0    glucose blood VI test strips (Accu-Chek Jamila Plus test strp) strip Use to test blood sugar three times a day. Dx.e11.9 100 Strip 11    acetaminophen (TYLENOL) 500 mg tablet Take 1,500 mg by mouth two (2) times daily as needed for Pain. Allergies   Allergen Reactions    Morphine Itching     Per patient's daughter, severe itching after Morphine IV.      Social History     Tobacco Use    Smoking status: Never Smoker    Smokeless tobacco: Never Used   Vaping Use  Vaping Use: Never used   Substance Use Topics    Alcohol use: Not Currently    Drug use: Not Currently     24-year-old lady with dementia the Alzheimer's type and anxiety who comes in a sooner than expected appointment. I last saw her in March and we continued Aricept and Namenda. We discussed increasing Zoloft and we took a watchful approach. Phone calls to the office noting that the patient was refusing her medications and having more crying spells interrupted sleep etc.  She was referred to the [employer] for some support etc. she tells me that she felt poorly on the medicine. Her balance was off. She just did not feel like her self. She was not sleeping. After stopping taking it she feels much better. She wants to drive. She wants to get out and do things. Her daughter adds that she does not want to go to any day programs etc.    Examination  Visit Vitals  BP (!) 148/57 (BP 1 Location: Left upper arm, BP Patient Position: Sitting, BP Cuff Size: Large adult)   Pulse (!) 108   Resp 16   Ht 5' 3\" (1.6 m)   Wt 73 kg (161 lb)   SpO2 99%   BMI 28.52 kg/m²     She is well-appearing and appropriately dressed and groomed. Awake alert oriented to May 2021. She recalls the current president, the previous president and the president prior to that. Follows commands. Fluent. No ataxia     Impression/Plan  Alzheimer's type dementia  Side effects perceived of medicine  Stay off of Aricept Namenda etc. for now  OT driving evaluation    Will [place] back after that is done    Rosevelt Hamman, MD    30 minutes spent today in chart review, face-to-face discussion, plan formulation and documentation. This note was created using voice recognition software. Despite editing, there may be syntax errors.

## 2021-06-15 NOTE — PROGRESS NOTES
Chief Complaint   Patient presents with   Alla Mendiolar     has had two nephews die very recently which has made her very sad.        Has stopped antidepressants and memantine which she says has improved her sleep and no more hallucinations

## 2021-06-16 ENCOUNTER — TELEPHONE (OUTPATIENT)
Dept: ENDOCRINOLOGY | Age: 71
End: 2021-06-16

## 2021-06-16 DIAGNOSIS — E11.65 TYPE 2 DIABETES MELLITUS WITH HYPERGLYCEMIA, UNSPECIFIED WHETHER LONG TERM INSULIN USE (HCC): Primary | ICD-10-CM

## 2021-06-16 RX ORDER — PEN NEEDLE, DIABETIC 31 GX3/16"
NEEDLE, DISPOSABLE MISCELLANEOUS
Qty: 200 PEN NEEDLE | Refills: 3 | Status: SHIPPED | OUTPATIENT
Start: 2021-06-16

## 2021-06-16 NOTE — TELEPHONE ENCOUNTER
----- Message from [facility] sent at 6/16/2021  1:32 PM EDT -----  Regarding: Dr. Huma Alvarez telephone  Medication Refill    Caller (if not patient): n/a       Relationship of caller (if not patient): n/a       Best contact number(s):[phone]      Name of medication and dosage if known: diabetic needles (smallest available)      Is patient out of this medication (yes/no): yes      Pharmacy name: [address] [place] [address] listed in chart? (yes/no): yes   Pharmacy phone number:      Details to clarify the request:       Lavelle "[employer]"[facility]

## 2021-06-17 ENCOUNTER — HOSPITAL ENCOUNTER (EMERGENCY)
Age: 71
Discharge: HOME | End: 2021-06-18
Attending: EMERGENCY MEDICINE
Payer: MEDICARE

## 2021-06-17 VITALS
OXYGEN SATURATION: 98 % | BODY MASS INDEX: 28.71 KG/M2 | HEIGHT: 63 IN | HEART RATE: 79 BPM | DIASTOLIC BLOOD PRESSURE: 64 MMHG | RESPIRATION RATE: 17 BRPM | WEIGHT: 162.04 LBS | SYSTOLIC BLOOD PRESSURE: 129 MMHG | TEMPERATURE: 98.1 F

## 2021-06-17 DIAGNOSIS — B02.29 POST HERPETIC NEURALGIA: ICD-10-CM

## 2021-06-17 DIAGNOSIS — B02.9 HERPES ZOSTER WITHOUT COMPLICATION: ICD-10-CM

## 2021-06-17 DIAGNOSIS — Z79.4 TYPE 2 DIABETES MELLITUS WITH HYPERGLYCEMIA, WITH LONG-TERM CURRENT USE OF INSULIN (HCC): Primary | ICD-10-CM

## 2021-06-17 DIAGNOSIS — E11.65 TYPE 2 DIABETES MELLITUS WITH HYPERGLYCEMIA, WITH LONG-TERM CURRENT USE OF INSULIN (HCC): Primary | ICD-10-CM

## 2021-06-17 LAB
GLUCOSE BLD STRIP.AUTO-MCNC: 259 MG/DL (ref 65–117)
SERVICE CMNT-IMP: ABNORMAL

## 2021-06-17 PROCEDURE — 82962 GLUCOSE BLOOD TEST: CPT

## 2021-06-17 PROCEDURE — 74011250637 HC RX REV CODE- 250/637: Performed by: EMERGENCY MEDICINE

## 2021-06-17 PROCEDURE — 99283 EMERGENCY DEPT VISIT LOW MDM: CPT

## 2021-06-17 RX ORDER — HYDROXYZINE 25 MG/1
50 TABLET, FILM COATED ORAL
Status: COMPLETED | OUTPATIENT
Start: 2021-06-17 | End: 2021-06-17

## 2021-06-17 RX ORDER — MAG HYDROX/ALUMINUM HYD/SIMETH 200-200-20
SUSPENSION, ORAL (FINAL DOSE FORM) ORAL
Qty: 30 G | Refills: 0 | Status: SHIPPED | OUTPATIENT
Start: 2021-06-17 | End: 2021-06-20 | Stop reason: SDUPTHER

## 2021-06-17 RX ORDER — HYDROXYZINE PAMOATE 50 MG/1
50 CAPSULE ORAL
Qty: 12 CAPSULE | Refills: 0 | Status: SHIPPED | OUTPATIENT
Start: 2021-06-17 | End: 2021-06-20 | Stop reason: SDUPTHER

## 2021-06-17 RX ADMIN — HYDROXYZINE HYDROCHLORIDE 50 MG: 25 TABLET, FILM COATED ORAL at 23:30

## 2021-06-18 NOTE — ED PROVIDER NOTES
The history is provided by the patient. High Blood Sugar   This is a recurrent problem. The current episode started more than 1 week ago. The problem occurs constantly. The problem has not changed since onset. Associated with: change in insulin, patient thought she was  Pain location: left side of abdomen and back. The quality of the pain is burning. The pain is mild. Associated symptoms include back pain (with underlying rash). Pertinent negatives include no anorexia, no fever, no diarrhea, no vomiting and no frequency. Nothing worsens the pain. The pain is relieved by nothing. Past Medical History:   Diagnosis Date    Alzheimer disease ([facility]     Anxiety     Auditory hallucinations     Depression     Diabetes ([facility]     Diverticulitis     Esophageal reflux     GERD (gastroesophageal reflux disease)     Hemorrhoids     internal and external    Hypertension     Stroke ([facility] [facility] 01/2018       Past Surgical History:   Procedure Laterality Date    FLEXIBLE SIGMOIDOSCOPY N/A 3/19/2021    FLEXIBLE SIGMOIDOSCOPY performed by Delisa Lopez MD at [facility] 112 HX CHOLECYSTECTOMY      HX HYSTERECTOMY           No family history on file.     Social History     Socioeconomic History    Marital status:      Spouse name: Not on file    Number of children: Not on file    Years of education: Not on file    Highest education level: Not on file   Occupational History    Not on file   Tobacco Use    Smoking status: Never Smoker    Smokeless tobacco: Never Used   Vaping Use    Vaping Use: Never used   Substance and Sexual Activity    Alcohol use: Not Currently    Drug use: Not Currently    Sexual activity: Not Currently   Other Topics Concern    Not on file   Social History Narrative    Not on file     Social Determinants of Health     Financial Resource Strain:     Difficulty of Paying Living Expenses:    Food Insecurity:     Worried About Running Out of Food in the Last Year:     Mckinley sutton Food in the Last Year:    Transportation Needs:     Lack of Transportation (Medical):  Lack of Transportation (Non-Medical):    Physical Activity:     Days of Exercise per Week:     Minutes of Exercise per Session:    Stress:     Feeling of Stress :    Social Connections:     Frequency of Communication with Friends and Family:     Frequency of Social Gatherings with Friends and Family:     Attends Alevism Services:     Active Member of Clubs or Organizations:     Attends Club or Organization Meetings:     Marital Status:    Intimate Partner Violence:     Fear of Current or Ex-Partner:     Emotionally Abused:     Physically Abused:     Sexually Abused: ALLERGIES: Morphine    Review of Systems   Constitutional: Negative for fever. Gastrointestinal: Negative for anorexia, diarrhea and vomiting. Genitourinary: Negative for frequency. Musculoskeletal: Positive for back pain (with underlying rash). All other systems reviewed and are negative. Vitals:    06/17/21 2259 06/17/21 2334 06/17/21 2345   BP: (!) 148/68 (!) 147/46 129/64   Pulse: 97     Resp: 18     Temp: 98 °F (36.7 °C)     SpO2: 100%  98%   Weight: 73.5 kg (162 lb 0.6 oz)     Height: 5' 3\" (1.6 m)              Physical Exam  Vitals and nursing note reviewed. Constitutional:       General: She is not in acute distress. Appearance: She is well-developed. HENT:      Head: Normocephalic and atraumatic. Mouth/Throat:      Mouth: Mucous membranes are moist.   Eyes:      Conjunctiva/sclera: Conjunctivae normal.   Cardiovascular:      Rate and Rhythm: Normal rate and regular rhythm. Heart sounds: Normal heart sounds. Pulmonary:      Effort: Pulmonary effort is normal. No respiratory distress. Breath sounds: Normal breath sounds. Abdominal:      General: There is no distension. Tenderness: There is no abdominal tenderness. There is no guarding. Musculoskeletal:         General: No deformity. Normal range of motion. Cervical back: Neck supple. Skin:     General: Skin is warm and dry. Findings: Rash (crusted, healing in dermatomal distribution) present. Neurological:      General: No focal deficit present. Mental Status: She is alert. Cranial Nerves: No cranial nerve deficit. Psychiatric:         Mood and Affect: Mood normal.         Behavior: Behavior normal.          MDM     49-year-old female presents with poorly controlled hyperglycemia over the last week. She had seen an endocrinologist and had her insulin changed. She appears to be underdosing her morning 70/30 insulin compared to chart review and has been noncompliant with Januvia because of perceived side effects but has not communicated this with her prescriber. She has a secondary complaint of a rash that appeared on her left flank which was painful, blistering and is now crusting. Possible she has a late-stage zoster outbreak and post-herpetic neuralgia. Discussed supportive care for this but since she is so far into course antivirals not indicated. Discussed need for adherence to prescribed regimen for better glycemic control. If she is unable to take these medications as prescribed she needs to discuss with her endocrinologist. Plan to follow up with PCP as needed and return precautions discussed for worsening or new concerning symptoms.      Procedures

## 2021-06-18 NOTE — DISCHARGE INSTRUCTIONS
70/30 insulin: Take 45 units of insulin after breakfast and 35 units of insulin after dinner as prescribed previously.

## 2021-06-18 NOTE — ED NOTES
Dr. Stephy Martinez reviewed discharge instructions with the patient and caregiver. The patient and caregiver verbalized understanding. The patient and caregiver was given opportunity for questions. Patient discharged in stable condition to the waiting room via ambulatory with daughter.

## 2021-06-18 NOTE — ED TRIAGE NOTES
Patient presents to ED for c/o hyperglycemia at home for about a week. Per pt, her highest BG reading was in the 500s. Patient takes 70-30 insulin. Has previously been taking 40u in the AM and PM, but diabetic specialist switched her to 40u in the AM and 35u in the PM    Reports some ankle swelling, most noticeably on L ankle. Also reports back pain. Further reports generalized itching.        in triage

## 2021-06-20 RX ORDER — HYDROXYZINE PAMOATE 50 MG/1
50 CAPSULE ORAL
Qty: 12 CAPSULE | Refills: 0 | Status: SHIPPED | OUTPATIENT
Start: 2021-06-20 | End: 2021-07-04

## 2021-06-20 RX ORDER — MAG HYDROX/ALUMINUM HYD/SIMETH 200-200-20
SUSPENSION, ORAL (FINAL DOSE FORM) ORAL
Qty: 30 G | Refills: 0 | Status: SHIPPED | OUTPATIENT
Start: 2021-06-20

## 2021-06-21 ENCOUNTER — PATIENT OUTREACH (OUTPATIENT)
Dept: CASE MANAGEMENT | Age: 71
End: 2021-06-21

## 2021-06-21 NOTE — PROGRESS NOTES
Educated patient about risk for severe COVID-19 due to risk factors according to CDC guidelines. ACM reviewed discharge instructions, medical action plan and red flag symptoms with the family who verbalized understanding. Discussed COVID vaccination status: yes. Education provided on COVID-19 vaccination as appropriate. Discussed exposure protocols and quarantine with CDC Guidelines. Family was given an opportunity to verbalize any questions and concerns and agrees to contact ACM or health care provider for questions related to their healthcare.

## 2021-07-06 ENCOUNTER — PATIENT OUTREACH (OUTPATIENT)
Dept: CASE MANAGEMENT | Age: 71
End: 2021-07-06

## 2021-07-06 RX ORDER — CLOPIDOGREL BISULFATE 75 MG/1
TABLET ORAL
Qty: 90 TABLET | Refills: 3 | Status: SHIPPED | OUTPATIENT
Start: 2021-07-06

## 2021-07-06 NOTE — PROGRESS NOTES
Patient resolved from [address] Transitions episode on 7/6/21. Discussed COVID-19 related testing which was not done at this time. Test results were not done. Patient informed of results, if available? n/a     Patient/family has been provided the following resources and education related to COVID-19:                         Signs, symptoms and red flags related to COVID-19            [facility] exposure and quarantine guidelines            [facility] exposure contact - [phone]            Contact for their local Department of Health               Patient currently reports that the following symptoms have improved:  no new symptoms and no worsening symptoms. No further outreach scheduled with this CTN/ACM/LPN/HC/ MA. Episode of Care resolved. Patient has this CTN/ACM/LPN/HC/MA contact information if future needs arise.

## 2021-07-12 RX ORDER — MEMANTINE HYDROCHLORIDE 10 MG/1
TABLET ORAL
Qty: 180 TABLET | Refills: 3 | Status: SHIPPED | OUTPATIENT
Start: 2021-07-12 | End: 2021-08-06 | Stop reason: SDUPTHER

## 2021-07-20 RX ORDER — INSULIN HUMAN 100 [IU]/ML
INJECTION, SUSPENSION SUBCUTANEOUS
Qty: 9 ADJUSTABLE DOSE PRE-FILLED PEN SYRINGE | Refills: 3 | Status: SHIPPED | OUTPATIENT
Start: 2021-07-20 | End: 2021-11-21

## 2021-08-03 PROBLEM — I10 HTN (HYPERTENSION): Status: RESOLVED | Noted: 2020-03-26 | Resolved: 2021-08-03

## 2021-08-04 ENCOUNTER — TELEPHONE (OUTPATIENT)
Dept: NEUROLOGY | Age: 71
End: 2021-08-04

## 2021-08-04 NOTE — TELEPHONE ENCOUNTER
Caller's first and last name: Nida Mccormick Daughter       Reason for call: pt has stopped her [employer] Medications and daughter would Vergia Esteeer to know should she resume them due to pt condition is getting worse       Callback required yes/no and why: yes       Best contact number(s): [phone]

## 2021-08-05 NOTE — TELEPHONE ENCOUNTER
S/w pt's daughter. She is very upset that pt is becoming more agitated, acting out, not sleeping, and reverting back to how she was prior to med start. She wanted to have pt restart meds which are at pharmacy on file. Per VO Dr. Conrad Guy. Advised restart memantine 10 mg 0.5 tab BID for 7 days then 0.5 tab in morning 1 tab qhs x 7 days, then 1 BID there after. Sertraline 50 mg start with 0.5 tab qhs for one week then may go to 0.5 tab BID OR one tab qhs (however pt tolerates), quetiapine 50 mg, restart at 0.5-one tab qhs. May give 0.5 tab during day prn agitation. F/u with Amanda Torres in 8 wks.   appt scheduled for 9/20/21 arrival 2:15 pm.

## 2021-08-06 ENCOUNTER — PATIENT MESSAGE (OUTPATIENT)
Dept: NEUROLOGY | Age: 71
End: 2021-08-06

## 2021-08-06 DIAGNOSIS — R44.0 AUDITORY HALLUCINATION: ICD-10-CM

## 2021-08-06 DIAGNOSIS — F32.A ANXIETY AND DEPRESSION: Primary | ICD-10-CM

## 2021-08-06 DIAGNOSIS — F02.80 LATE ONSET ALZHEIMER'S DISEASE WITHOUT BEHAVIORAL DISTURBANCE (HCC): ICD-10-CM

## 2021-08-06 DIAGNOSIS — F03.911 AGITATION DUE TO DEMENTIA: ICD-10-CM

## 2021-08-06 DIAGNOSIS — F41.9 ANXIETY AND DEPRESSION: Primary | ICD-10-CM

## 2021-08-06 DIAGNOSIS — G30.1 LATE ONSET ALZHEIMER'S DISEASE WITHOUT BEHAVIORAL DISTURBANCE (HCC): ICD-10-CM

## 2021-08-06 RX ORDER — QUETIAPINE FUMARATE 50 MG/1
TABLET, FILM COATED ORAL
Qty: 45 TABLET | Refills: 0 | Status: SHIPPED | OUTPATIENT
Start: 2021-08-06

## 2021-08-06 RX ORDER — MEMANTINE HYDROCHLORIDE 10 MG/1
TABLET ORAL
Qty: 180 TABLET | Refills: 0 | Status: SHIPPED | OUTPATIENT
Start: 2021-08-06

## 2021-08-06 RX ORDER — SERTRALINE HYDROCHLORIDE 50 MG/1
50 TABLET, FILM COATED ORAL
Qty: 90 TABLET | Refills: 0 | Status: SHIPPED | OUTPATIENT
Start: 2021-08-06

## 2021-08-06 NOTE — TELEPHONE ENCOUNTER
----- Message from Tisha sent at 8/6/2021 10:43 AM EDT -----  Regarding: Prescription Question  Contact: [phone]  I spoke to [facility] yesterday and she was a big help. Below are the meds that need refills.     Refills are needed for QUEtiapine, Memantine and sertraline     [facility] [address], [place], [address] [facility]

## 2021-09-04 RX ORDER — AMLODIPINE BESYLATE 10 MG/1
TABLET ORAL
Qty: 90 TABLET | Refills: 1 | Status: SHIPPED | OUTPATIENT
Start: 2021-09-04

## 2021-09-16 RX ORDER — PANTOPRAZOLE SODIUM 40 MG/1
TABLET, DELAYED RELEASE ORAL
Qty: 90 TABLET | Refills: 3 | Status: SHIPPED | OUTPATIENT
Start: 2021-09-16

## 2021-10-30 LAB — HBA1C MFR BLD HPLC: 8.4 %

## 2021-11-21 RX ORDER — INSULIN HUMAN 100 [IU]/ML
INJECTION, SUSPENSION SUBCUTANEOUS
Qty: 27 ML | Refills: 1 | Status: SHIPPED | OUTPATIENT
Start: 2021-11-21

## 2021-11-22 ENCOUNTER — TELEPHONE (OUTPATIENT)
Dept: PHARMACY | Age: 71
End: 2021-11-22

## 2021-11-22 NOTE — TELEPHONE ENCOUNTER
CLINICAL PHARMACY: ADHERENCE REVIEW  Identified care gap per Rey; fills at [facility]: ACE/ARB and Statin adherence    Last Visit: 6/15/2021    Patient identified as LIS = 3, therefore patient's co-pays are $0.00 through all phases of the benefit regardless of the days' supply dispensed    Patient also appears to be prescribed: HTN, DM    Patient not found in Outcomes MTM    ASSESSMENT  ACE/ARB ADHERENCE    Per Insurance Records through 11/[facility] = 84%; Potential Fail Date: 11/29/2021):   Lisinopril 10mg last filled on 10/1/2021 for 30 day supply. Next refill due: 10/31/2021    Per [address] [place] [address] ([phone]): Lisinopril 10mg last picked up on 11/10/21 for 60 day supply. 0 refills remaining. Billed through [place]    BP Readings from Last 3 Encounters:   06/17/21 129/64   06/15/21 (!) 148/57   06/04/21 (!) 129/55     Estimated Creatinine Clearance: 50.1 mL/min (by C-G formula based on SCr of 0.96 mg/dL). STATIN ADHERENCE    Per Insurance Records through 11/6/2021 ([facility] [employer] [place] = filled only once): Atorvastatin 40mg last filled on 8/24/2021 for 30 day supply. Next refill due: 9/23/2021    Per [facility]:   Atorvastatin 40mg last picked up on 8/25/2021 for 30 day supply. 0 refills remaining. Billed through [employer]    Not on current medication list and prescribing provider (Violet Barth) doesn't appear to be a [address] [place] [address] provider.     Lab Results   Component Value Date/Time    Cholesterol, total 191 12/30/2019 12:00 AM    HDL Cholesterol 84 12/30/2019 12:00 AM    LDL, calculated 70 12/30/2019 12:00 AM    VLDL, calculated 37 12/30/2019 12:00 AM    Triglyceride 184 (H) 12/30/2019 12:00 AM     ALT (SGPT)   Date Value Ref Range Status   05/28/2021 70 12 - 78 U/L Final     AST (SGOT)   Date Value Ref Range Status   05/28/2021 92 (H) 15 - 37 U/L Final     The 10-year ASCVD risk score (Lesia Reyez, et al., 2013) is: 32.4%    Values used to calculate the score:      Age: 66 years      Sex: Female      Is Non- : Yes      Diabetic: Yes      Tobacco smoker: No      Systolic Blood Pressure: 069 mmHg      Is BP treated: Yes      HDL Cholesterol: 84 mg/dL      Total Cholesterol: 191 mg/dL     PLAN  The following are interventions that have been identified:  - Patient overdue refilling Lisinopril 10mg and active on home medication list  - Of note, lisinopril 10mg was filled and picked up on 11/10 for 60DS (should have been due for a refill 10/31)   - Patient overdue refilling Atorvastatin 40mg. Unsure if patient is still prescribed this medication. Check with patient/caregiver if current med   - Patient eligible for 90 day supply of Atorvastatin 40mg and Lisinopril 10mg (based on LIS 3; see if patient would like us to request 90DS RX from provider)  - Assess if any adherence barriers are present     Attempting to reach patient to review.  Left message asking for return call. No future appointments.     Lacy Aleman, PharmD  [facility] PGY1 Pharmacy Resident  Population Health Rotation  Department toll free: [phone], option 2

## 2021-11-29 NOTE — TELEPHONE ENCOUNTER
Failed 2021 for atorvastatin 40mg. Did confirm with [facility] that lisinopril 10mg, 60DS, billed through [email or web address] was picked up 11/10/2021 (after [place] report was pulled). Patient has not reached back out.  Will close encounter.     =============================================================    For Pharmacy [address] in place: No   Gap Closed?: Yes   Time Spent (min): 15

## 2022-01-17 RX ORDER — LISINOPRIL 10 MG/1
TABLET ORAL
Qty: 30 TABLET | Refills: 5 | Status: SHIPPED | OUTPATIENT
Start: 2022-01-17

## 2022-03-18 PROBLEM — K74.5 BILIARY CIRRHOSIS (HCC): Status: ACTIVE | Noted: 2017-12-16

## 2022-03-18 PROBLEM — E11.21 MICROALBUMINURIC DIABETIC NEPHROPATHY (HCC): Status: ACTIVE | Noted: 2017-12-16

## 2022-03-18 PROBLEM — E78.2 MIXED HYPERLIPIDEMIA: Status: ACTIVE | Noted: 2017-12-16

## 2022-03-19 PROBLEM — F32.A DEPRESSION: Status: ACTIVE | Noted: 2020-03-26

## 2022-03-19 PROBLEM — F41.9 ANXIETY: Status: ACTIVE | Noted: 2020-03-26

## 2022-03-19 PROBLEM — F43.22 ADJUSTMENT DISORDER WITH ANXIOUS MOOD: Status: ACTIVE | Noted: 2017-12-16

## 2022-03-19 PROBLEM — K74.60 CIRRHOSIS OF LIVER (HCC): Status: ACTIVE | Noted: 2018-05-21

## 2022-03-19 PROBLEM — N17.9 AKI (ACUTE KIDNEY INJURY) (HCC): Status: ACTIVE | Noted: 2020-03-26

## 2022-03-19 PROBLEM — M54.16 LUMBAR RADICULOPATHY: Status: ACTIVE | Noted: 2017-12-16

## 2022-03-19 PROBLEM — E87.1 HYPONATREMIA: Status: ACTIVE | Noted: 2020-03-26

## 2022-03-19 PROBLEM — D50.9 MICROCYTIC ANEMIA: Status: ACTIVE | Noted: 2020-03-26

## 2022-03-19 PROBLEM — J30.9 ALLERGIC RHINITIS: Status: ACTIVE | Noted: 2017-12-16

## 2022-03-19 PROBLEM — I67.2 CEREBRAL ATHEROSCLEROSIS: Status: ACTIVE | Noted: 2018-08-27

## 2022-03-20 PROBLEM — K21.9 GERD (GASTROESOPHAGEAL REFLUX DISEASE): Status: ACTIVE | Noted: 2020-03-26

## 2024-12-01 NOTE — TELEPHONE ENCOUNTER
Sent seroquel 50 mg #60/2 refills per VO Dr. Nena Bright. Notified Ms. Bertha Monroy that Dr. Nena Bright stated it is best to continue the seroquel. PROVIDER:[TOKEN:[[id number]],FOLLOWUP:[2 weeks]]

## (undated) DEVICE — THE ENDO CARRY-ON PROCEDURE KIT CONTAINS ALL OF THE SUPPLIES AND INFECTION PREVENTION PRODUCTS NEEDED FOR ENDOSCOPIC PROCEDURES: Brand: ENDO CARRY-ON PROCEDURE KIT

## (undated) DEVICE — CANNULA NSL O2 AD 7 FT END-TIDAL CARBON DIOX VENTFLO

## (undated) DEVICE — FORCEPS BX L240CM JAW DIA2.2MM RAD JAW 4 HOT DISP